# Patient Record
Sex: MALE | Race: WHITE | NOT HISPANIC OR LATINO | Employment: OTHER | URBAN - METROPOLITAN AREA
[De-identification: names, ages, dates, MRNs, and addresses within clinical notes are randomized per-mention and may not be internally consistent; named-entity substitution may affect disease eponyms.]

---

## 2017-05-18 ENCOUNTER — COMMUNICATION - HEALTHEAST (OUTPATIENT)
Dept: INTERNAL MEDICINE | Facility: CLINIC | Age: 62
End: 2017-05-18

## 2017-05-18 DIAGNOSIS — I10 HTN (HYPERTENSION): ICD-10-CM

## 2017-05-18 DIAGNOSIS — D86.2 SARCOIDOSIS OF LUNG WITH SARCOIDOSIS OF LYMPH NODES (H): ICD-10-CM

## 2017-05-22 ENCOUNTER — COMMUNICATION - HEALTHEAST (OUTPATIENT)
Dept: INTERNAL MEDICINE | Facility: CLINIC | Age: 62
End: 2017-05-22

## 2017-05-22 DIAGNOSIS — I10 UNSPECIFIED ESSENTIAL HYPERTENSION: ICD-10-CM

## 2017-07-27 ENCOUNTER — OFFICE VISIT - HEALTHEAST (OUTPATIENT)
Dept: INTERNAL MEDICINE | Facility: CLINIC | Age: 62
End: 2017-07-27

## 2017-07-27 DIAGNOSIS — Z13.220 SCREENING CHOLESTEROL LEVEL: ICD-10-CM

## 2017-07-27 DIAGNOSIS — Z00.00 ROUTINE GENERAL MEDICAL EXAMINATION AT A HEALTH CARE FACILITY: ICD-10-CM

## 2017-07-27 DIAGNOSIS — Z12.5 SPECIAL SCREENING FOR MALIGNANT NEOPLASM OF PROSTATE: ICD-10-CM

## 2017-07-27 DIAGNOSIS — D12.6 ADENOMATOUS COLON POLYP: ICD-10-CM

## 2017-07-27 DIAGNOSIS — D86.2 SARCOIDOSIS OF LUNG WITH SARCOIDOSIS OF LYMPH NODES (H): ICD-10-CM

## 2017-07-27 DIAGNOSIS — I10 ESSENTIAL HYPERTENSION: ICD-10-CM

## 2017-07-27 LAB
CHOLEST SERPL-MCNC: 184 MG/DL
FASTING STATUS PATIENT QL REPORTED: NO
HDLC SERPL-MCNC: 36 MG/DL
LDLC SERPL CALC-MCNC: 79 MG/DL
PSA SERPL-MCNC: 0.2 NG/ML (ref 0–4.5)
TRIGL SERPL-MCNC: 343 MG/DL

## 2017-07-27 ASSESSMENT — MIFFLIN-ST. JEOR: SCORE: 1891.69

## 2017-10-03 ENCOUNTER — COMMUNICATION - HEALTHEAST (OUTPATIENT)
Dept: INTERNAL MEDICINE | Facility: CLINIC | Age: 62
End: 2017-10-03

## 2017-10-03 DIAGNOSIS — I10 ESSENTIAL HYPERTENSION: ICD-10-CM

## 2017-11-18 ENCOUNTER — COMMUNICATION - HEALTHEAST (OUTPATIENT)
Dept: INTERNAL MEDICINE | Facility: CLINIC | Age: 62
End: 2017-11-18

## 2017-11-18 DIAGNOSIS — I10 ESSENTIAL HYPERTENSION: ICD-10-CM

## 2017-11-27 ENCOUNTER — COMMUNICATION - HEALTHEAST (OUTPATIENT)
Dept: INTERNAL MEDICINE | Facility: CLINIC | Age: 62
End: 2017-11-27

## 2017-11-28 ENCOUNTER — RECORDS - HEALTHEAST (OUTPATIENT)
Dept: GENERAL RADIOLOGY | Facility: CLINIC | Age: 62
End: 2017-11-28

## 2017-11-28 ENCOUNTER — OFFICE VISIT - HEALTHEAST (OUTPATIENT)
Dept: INTERNAL MEDICINE | Facility: CLINIC | Age: 62
End: 2017-11-28

## 2017-11-28 DIAGNOSIS — R05.9 COUGH: ICD-10-CM

## 2017-12-05 ENCOUNTER — OFFICE VISIT - HEALTHEAST (OUTPATIENT)
Dept: INTERNAL MEDICINE | Facility: CLINIC | Age: 62
End: 2017-12-05

## 2017-12-05 DIAGNOSIS — I80.02 THROMBOPHLEBITIS OF SUPERFICIAL VEINS OF LEFT LOWER EXTREMITY: ICD-10-CM

## 2018-01-01 ENCOUNTER — COMMUNICATION - HEALTHEAST (OUTPATIENT)
Dept: SCHEDULING | Facility: CLINIC | Age: 63
End: 2018-01-01

## 2018-01-03 ENCOUNTER — AMBULATORY - HEALTHEAST (OUTPATIENT)
Dept: MULTI SPECIALTY CLINIC | Facility: CLINIC | Age: 63
End: 2018-01-03

## 2018-01-03 DIAGNOSIS — I26.92 ACUTE SADDLE PULMONARY EMBOLISM WITHOUT ACUTE COR PULMONALE (H): ICD-10-CM

## 2018-01-08 ENCOUNTER — OFFICE VISIT - HEALTHEAST (OUTPATIENT)
Dept: INTERNAL MEDICINE | Facility: CLINIC | Age: 63
End: 2018-01-08

## 2018-01-08 DIAGNOSIS — I10 ESSENTIAL HYPERTENSION: ICD-10-CM

## 2018-01-08 DIAGNOSIS — I26.99 PULMONARY EMBOLI (H): ICD-10-CM

## 2018-01-08 DIAGNOSIS — I26.92 ACUTE SADDLE PULMONARY EMBOLISM WITHOUT ACUTE COR PULMONALE (H): ICD-10-CM

## 2018-01-08 DIAGNOSIS — N28.89 LEFT KIDNEY MASS: ICD-10-CM

## 2018-01-08 LAB
ANION GAP SERPL CALCULATED.3IONS-SCNC: 10 MMOL/L (ref 5–18)
BUN SERPL-MCNC: 20 MG/DL (ref 8–22)
CALCIUM SERPL-MCNC: 9.1 MG/DL (ref 8.5–10.5)
CHLORIDE BLD-SCNC: 104 MMOL/L (ref 98–107)
CO2 SERPL-SCNC: 27 MMOL/L (ref 22–31)
CREAT SERPL-MCNC: 1.19 MG/DL (ref 0.7–1.3)
GFR SERPL CREATININE-BSD FRML MDRD: >60 ML/MIN/1.73M2
GLUCOSE BLD-MCNC: 90 MG/DL (ref 70–125)
POTASSIUM BLD-SCNC: 4.3 MMOL/L (ref 3.5–5)
SODIUM SERPL-SCNC: 141 MMOL/L (ref 136–145)

## 2018-01-09 ENCOUNTER — COMMUNICATION - HEALTHEAST (OUTPATIENT)
Dept: INTERNAL MEDICINE | Facility: CLINIC | Age: 63
End: 2018-01-09

## 2018-01-09 ENCOUNTER — HOSPITAL ENCOUNTER (OUTPATIENT)
Dept: CT IMAGING | Facility: CLINIC | Age: 63
Discharge: HOME | End: 2018-01-09
Attending: INTERNAL MEDICINE

## 2018-01-09 DIAGNOSIS — I10 ESSENTIAL HYPERTENSION: ICD-10-CM

## 2018-01-09 DIAGNOSIS — N28.89 LEFT KIDNEY MASS: ICD-10-CM

## 2018-01-09 LAB
FACTOR V LEIDEN - HISTORICAL: NORMAL
PROTHROMBIN G20210A MUTATION - HISTORICAL: NORMAL
SPECIMEN STATUS: NORMAL
SPECIMEN STATUS: NORMAL
THROMBOPHILIA RISK ASSESSMENT-FVL: NORMAL
THROMBOPHILIA RISK ASSESSMENT-PGM: NORMAL

## 2018-01-10 LAB
PHOSPHOLIPID AB IGG, S: <9.4 GPL
PHOSPHOLIPID AB IGM, S: <9.4 MPL

## 2018-01-25 ENCOUNTER — COMMUNICATION - HEALTHEAST (OUTPATIENT)
Dept: INTERNAL MEDICINE | Facility: CLINIC | Age: 63
End: 2018-01-25

## 2018-02-06 ENCOUNTER — OFFICE VISIT - HEALTHEAST (OUTPATIENT)
Dept: INTERNAL MEDICINE | Facility: CLINIC | Age: 63
End: 2018-02-06

## 2018-02-06 DIAGNOSIS — I10 ESSENTIAL HYPERTENSION: ICD-10-CM

## 2018-02-06 DIAGNOSIS — R60.9 EDEMA: ICD-10-CM

## 2018-02-06 DIAGNOSIS — I26.99 PULMONARY EMBOLI (H): ICD-10-CM

## 2018-02-09 ENCOUNTER — COMMUNICATION - HEALTHEAST (OUTPATIENT)
Dept: LAB | Facility: CLINIC | Age: 63
End: 2018-02-09

## 2018-02-12 ENCOUNTER — AMBULATORY - HEALTHEAST (OUTPATIENT)
Dept: INTERNAL MEDICINE | Facility: CLINIC | Age: 63
End: 2018-02-12

## 2018-02-12 DIAGNOSIS — I10 HYPERTENSION: ICD-10-CM

## 2018-02-20 ENCOUNTER — AMBULATORY - HEALTHEAST (OUTPATIENT)
Dept: NURSING | Facility: CLINIC | Age: 63
End: 2018-02-20

## 2018-02-20 ENCOUNTER — AMBULATORY - HEALTHEAST (OUTPATIENT)
Dept: LAB | Facility: CLINIC | Age: 63
End: 2018-02-20

## 2018-02-20 DIAGNOSIS — I10 HYPERTENSION: ICD-10-CM

## 2018-02-20 LAB
ANION GAP SERPL CALCULATED.3IONS-SCNC: 12 MMOL/L (ref 5–18)
BUN SERPL-MCNC: 24 MG/DL (ref 8–22)
CALCIUM SERPL-MCNC: 8.8 MG/DL (ref 8.5–10.5)
CHLORIDE BLD-SCNC: 102 MMOL/L (ref 98–107)
CO2 SERPL-SCNC: 26 MMOL/L (ref 22–31)
CREAT SERPL-MCNC: 1.36 MG/DL (ref 0.7–1.3)
GFR SERPL CREATININE-BSD FRML MDRD: 53 ML/MIN/1.73M2
GLUCOSE BLD-MCNC: 108 MG/DL (ref 70–125)
POTASSIUM BLD-SCNC: 4.2 MMOL/L (ref 3.5–5)
SODIUM SERPL-SCNC: 140 MMOL/L (ref 136–145)

## 2018-03-19 ENCOUNTER — COMMUNICATION - HEALTHEAST (OUTPATIENT)
Dept: PULMONOLOGY | Facility: OTHER | Age: 63
End: 2018-03-19

## 2018-04-18 ENCOUNTER — HOSPITAL ENCOUNTER (OUTPATIENT)
Dept: CARDIOLOGY | Facility: CLINIC | Age: 63
Discharge: HOME | End: 2018-04-18
Attending: INTERNAL MEDICINE

## 2018-04-18 DIAGNOSIS — I26.92 ACUTE SADDLE PULMONARY EMBOLISM WITHOUT ACUTE COR PULMONALE (H): ICD-10-CM

## 2018-04-18 LAB
AORTIC ROOT: 3.2 CM
BSA FOR ECHO PROCEDURE: 2.35 M2
CV BLOOD PRESSURE: NORMAL MMHG
CV ECHO HEIGHT: 70 IN
CV ECHO WEIGHT: 248 LBS
DOP CALC LVOT AREA: 3.14 CM2
DOP CALC LVOT DIAMETER: 2 CM
DOP CALC LVOT PEAK VEL: 70.9 CM/S
DOP CALC LVOT STROKE VOLUME: 48 CM3
DOP CALCLVOT PEAK VEL VTI: 15.3 CM
EJECTION FRACTION: 56 % (ref 55–75)
FRACTIONAL SHORTENING: 27.7 % (ref 28–44)
INTERVENTRICULAR SEPTUM IN END DIASTOLE: 0.92 CM (ref 0.6–1)
IVS/PW RATIO: 1
LEFT ATRIUM SIZE: 3.7 CM
LEFT ATRIUM TO AORTIC ROOT RATIO: 1.16 NO UNITS
LEFT VENTRICLE DIASTOLIC VOLUME INDEX: 30.4 CM3/M2 (ref 34–74)
LEFT VENTRICLE DIASTOLIC VOLUME: 71.4 CM3 (ref 62–150)
LEFT VENTRICLE MASS INDEX: 66.7 G/M2
LEFT VENTRICLE SYSTOLIC VOLUME INDEX: 13.3 CM3/M2 (ref 11–31)
LEFT VENTRICLE SYSTOLIC VOLUME: 31.2 CM3 (ref 21–61)
LEFT VENTRICULAR INTERNAL DIMENSION IN DIASTOLE: 4.81 CM (ref 4.2–5.8)
LEFT VENTRICULAR INTERNAL DIMENSION IN SYSTOLE: 3.48 CM (ref 2.5–4)
LEFT VENTRICULAR MASS: 156.7 G
LEFT VENTRICULAR OUTFLOW TRACT MEAN GRADIENT: 1 MMHG
LEFT VENTRICULAR OUTFLOW TRACT MEAN VELOCITY: 50.3 CM/S
LEFT VENTRICULAR OUTFLOW TRACT PEAK GRADIENT: 2 MMHG
LEFT VENTRICULAR POSTERIOR WALL IN END DIASTOLE: 0.96 CM (ref 0.6–1)
LV STROKE VOLUME INDEX: 20.4 ML/M2
MITRAL VALVE E/A RATIO: 0.8
MV E'TISSUE VEL-LAT: 9.19 CM/S
MV LATERAL E/E' RATIO: 6.9
MV PEAK A VELOCITY: 84.5 CM/S
MV PEAK E VELOCITY: 63.6 CM/S
NUC REST DIASTOLIC VOLUME INDEX: 3968 LBS
NUC REST SYSTOLIC VOLUME INDEX: 70 IN
TRICUSPID VALVE ANULAR PLANE SYSTOLIC EXCURSION: 2.4 CM

## 2018-04-18 ASSESSMENT — MIFFLIN-ST. JEOR: SCORE: 1921.17

## 2018-05-04 ENCOUNTER — OFFICE VISIT - HEALTHEAST (OUTPATIENT)
Dept: PULMONOLOGY | Facility: OTHER | Age: 63
End: 2018-05-04

## 2018-05-04 DIAGNOSIS — I26.02 ACUTE SADDLE PULMONARY EMBOLISM WITH ACUTE COR PULMONALE (H): ICD-10-CM

## 2018-05-24 ENCOUNTER — COMMUNICATION - HEALTHEAST (OUTPATIENT)
Dept: INTENSIVE CARE | Facility: CLINIC | Age: 63
End: 2018-05-24

## 2018-05-24 DIAGNOSIS — I26.99 PULMONARY EMBOLISM WITH INFARCTION (H): ICD-10-CM

## 2018-06-03 ENCOUNTER — COMMUNICATION - HEALTHEAST (OUTPATIENT)
Dept: INTERNAL MEDICINE | Facility: CLINIC | Age: 63
End: 2018-06-03

## 2018-06-03 DIAGNOSIS — D86.2 SARCOIDOSIS OF LUNG WITH SARCOIDOSIS OF LYMPH NODES (H): ICD-10-CM

## 2018-06-03 DIAGNOSIS — I10 HTN (HYPERTENSION): ICD-10-CM

## 2018-06-19 ENCOUNTER — COMMUNICATION - HEALTHEAST (OUTPATIENT)
Dept: INTENSIVE CARE | Facility: CLINIC | Age: 63
End: 2018-06-19

## 2018-06-19 DIAGNOSIS — I26.99 PULMONARY EMBOLISM WITH INFARCTION (H): ICD-10-CM

## 2018-07-22 ENCOUNTER — COMMUNICATION - HEALTHEAST (OUTPATIENT)
Dept: INTENSIVE CARE | Facility: CLINIC | Age: 63
End: 2018-07-22

## 2018-07-22 DIAGNOSIS — I26.99 PULMONARY EMBOLISM WITH INFARCTION (H): ICD-10-CM

## 2018-07-30 ENCOUNTER — OFFICE VISIT - HEALTHEAST (OUTPATIENT)
Dept: INTERNAL MEDICINE | Facility: CLINIC | Age: 63
End: 2018-07-30

## 2018-07-30 DIAGNOSIS — Z86.0100 HISTORY OF COLONIC POLYPS: ICD-10-CM

## 2018-07-30 DIAGNOSIS — Z51.81 MEDICATION MONITORING ENCOUNTER: ICD-10-CM

## 2018-07-30 DIAGNOSIS — I10 BENIGN ESSENTIAL HYPERTENSION: ICD-10-CM

## 2018-07-30 DIAGNOSIS — Z00.00 HEALTH CARE MAINTENANCE: ICD-10-CM

## 2018-07-30 DIAGNOSIS — L98.9 SKIN ABNORMALITIES: ICD-10-CM

## 2018-07-30 DIAGNOSIS — Z86.711 HISTORY OF PULMONARY EMBOLISM: ICD-10-CM

## 2018-07-30 DIAGNOSIS — Z12.5 SCREENING FOR PROSTATE CANCER: ICD-10-CM

## 2018-07-30 LAB
ALBUMIN SERPL-MCNC: 3.9 G/DL (ref 3.5–5)
ALP SERPL-CCNC: 74 U/L (ref 45–120)
ALT SERPL W P-5'-P-CCNC: 24 U/L (ref 0–45)
ANION GAP SERPL CALCULATED.3IONS-SCNC: 14 MMOL/L (ref 5–18)
AST SERPL W P-5'-P-CCNC: 23 U/L (ref 0–40)
BILIRUB SERPL-MCNC: 0.7 MG/DL (ref 0–1)
BUN SERPL-MCNC: 21 MG/DL (ref 8–22)
CALCIUM SERPL-MCNC: 9.3 MG/DL (ref 8.5–10.5)
CHLORIDE BLD-SCNC: 102 MMOL/L (ref 98–107)
CHOLEST SERPL-MCNC: 209 MG/DL
CO2 SERPL-SCNC: 26 MMOL/L (ref 22–31)
CREAT SERPL-MCNC: 1.4 MG/DL (ref 0.7–1.3)
ERYTHROCYTE [DISTWIDTH] IN BLOOD BY AUTOMATED COUNT: 12.2 % (ref 11–14.5)
FASTING STATUS PATIENT QL REPORTED: YES
GFR SERPL CREATININE-BSD FRML MDRD: 52 ML/MIN/1.73M2
GLUCOSE BLD-MCNC: 89 MG/DL (ref 70–125)
HCT VFR BLD AUTO: 50.9 % (ref 40–54)
HDLC SERPL-MCNC: 44 MG/DL
HGB BLD-MCNC: 17 G/DL (ref 14–18)
LDLC SERPL CALC-MCNC: 111 MG/DL
MCH RBC QN AUTO: 31 PG (ref 27–34)
MCHC RBC AUTO-ENTMCNC: 33.4 G/DL (ref 32–36)
MCV RBC AUTO: 93 FL (ref 80–100)
PLATELET # BLD AUTO: 184 THOU/UL (ref 140–440)
PMV BLD AUTO: 8.3 FL (ref 7–10)
POTASSIUM BLD-SCNC: 4 MMOL/L (ref 3.5–5)
PROT SERPL-MCNC: 6.5 G/DL (ref 6–8)
PSA SERPL-MCNC: 0.3 NG/ML (ref 0–4.5)
RBC # BLD AUTO: 5.48 MILL/UL (ref 4.4–6.2)
SODIUM SERPL-SCNC: 142 MMOL/L (ref 136–145)
TRIGL SERPL-MCNC: 272 MG/DL
WBC: 10.8 THOU/UL (ref 4–11)

## 2018-07-30 RX ORDER — LATANOPROST 50 UG/ML
SOLUTION/ DROPS OPHTHALMIC
Refills: 4 | Status: SHIPPED | COMMUNITY
Start: 2018-05-16

## 2018-07-30 ASSESSMENT — MIFFLIN-ST. JEOR: SCORE: 1941.35

## 2018-07-31 ENCOUNTER — COMMUNICATION - HEALTHEAST (OUTPATIENT)
Dept: INTERNAL MEDICINE | Facility: CLINIC | Age: 63
End: 2018-07-31

## 2018-08-21 ENCOUNTER — COMMUNICATION - HEALTHEAST (OUTPATIENT)
Dept: INTENSIVE CARE | Facility: CLINIC | Age: 63
End: 2018-08-21

## 2018-08-21 DIAGNOSIS — I26.99 PULMONARY EMBOLISM WITH INFARCTION (H): ICD-10-CM

## 2018-11-16 ENCOUNTER — COMMUNICATION - HEALTHEAST (OUTPATIENT)
Dept: INTERNAL MEDICINE | Facility: CLINIC | Age: 63
End: 2018-11-16

## 2018-11-16 DIAGNOSIS — I10 ESSENTIAL HYPERTENSION: ICD-10-CM

## 2018-11-20 ENCOUNTER — COMMUNICATION - HEALTHEAST (OUTPATIENT)
Dept: INTERNAL MEDICINE | Facility: CLINIC | Age: 63
End: 2018-11-20

## 2018-11-20 DIAGNOSIS — I10 ESSENTIAL HYPERTENSION: ICD-10-CM

## 2018-11-23 ENCOUNTER — COMMUNICATION - HEALTHEAST (OUTPATIENT)
Dept: INTERNAL MEDICINE | Facility: CLINIC | Age: 63
End: 2018-11-23

## 2018-11-23 DIAGNOSIS — D86.2 SARCOIDOSIS OF LUNG WITH SARCOIDOSIS OF LYMPH NODES (H): ICD-10-CM

## 2018-11-23 DIAGNOSIS — I10 ESSENTIAL HYPERTENSION: ICD-10-CM

## 2018-11-23 DIAGNOSIS — I10 HTN (HYPERTENSION): ICD-10-CM

## 2019-01-31 ENCOUNTER — OFFICE VISIT - HEALTHEAST (OUTPATIENT)
Dept: INTERNAL MEDICINE | Facility: CLINIC | Age: 64
End: 2019-01-31

## 2019-01-31 DIAGNOSIS — I10 BENIGN ESSENTIAL HYPERTENSION: ICD-10-CM

## 2019-01-31 DIAGNOSIS — R53.83 FATIGUE, UNSPECIFIED TYPE: ICD-10-CM

## 2019-01-31 DIAGNOSIS — Z51.81 MEDICATION MONITORING ENCOUNTER: ICD-10-CM

## 2019-01-31 DIAGNOSIS — Z86.711 HISTORY OF PULMONARY EMBOLISM: ICD-10-CM

## 2019-01-31 LAB
ANION GAP SERPL CALCULATED.3IONS-SCNC: 10 MMOL/L (ref 5–18)
BUN SERPL-MCNC: 26 MG/DL (ref 8–22)
CALCIUM SERPL-MCNC: 9.4 MG/DL (ref 8.5–10.5)
CHLORIDE BLD-SCNC: 102 MMOL/L (ref 98–107)
CO2 SERPL-SCNC: 27 MMOL/L (ref 22–31)
CREAT SERPL-MCNC: 1.29 MG/DL (ref 0.7–1.3)
GFR SERPL CREATININE-BSD FRML MDRD: 57 ML/MIN/1.73M2
GLUCOSE BLD-MCNC: 100 MG/DL (ref 70–125)
POTASSIUM BLD-SCNC: 4.1 MMOL/L (ref 3.5–5)
SODIUM SERPL-SCNC: 139 MMOL/L (ref 136–145)

## 2019-02-01 ENCOUNTER — COMMUNICATION - HEALTHEAST (OUTPATIENT)
Dept: INTERNAL MEDICINE | Facility: CLINIC | Age: 64
End: 2019-02-01

## 2019-04-08 ENCOUNTER — OFFICE VISIT - HEALTHEAST (OUTPATIENT)
Dept: SLEEP MEDICINE | Facility: CLINIC | Age: 64
End: 2019-04-08

## 2019-04-08 DIAGNOSIS — R06.83 SNORING: ICD-10-CM

## 2019-04-08 DIAGNOSIS — G47.10 HYPERSOMNIA: ICD-10-CM

## 2019-04-08 DIAGNOSIS — G47.8 SLEEP DYSFUNCTION WITH SLEEP STAGE DISTURBANCE: ICD-10-CM

## 2019-04-08 DIAGNOSIS — E66.01 MORBID OBESITY (H): ICD-10-CM

## 2019-04-08 ASSESSMENT — MIFFLIN-ST. JEOR: SCORE: 1926.84

## 2019-05-02 ENCOUNTER — RECORDS - HEALTHEAST (OUTPATIENT)
Dept: ADMINISTRATIVE | Facility: OTHER | Age: 64
End: 2019-05-02

## 2019-05-02 ENCOUNTER — RECORDS - HEALTHEAST (OUTPATIENT)
Dept: SLEEP MEDICINE | Facility: CLINIC | Age: 64
End: 2019-05-02

## 2019-05-02 DIAGNOSIS — R06.83 SNORING: ICD-10-CM

## 2019-05-02 DIAGNOSIS — E66.01 MORBID (SEVERE) OBESITY DUE TO EXCESS CALORIES (H): ICD-10-CM

## 2019-05-02 DIAGNOSIS — G47.8 OTHER SLEEP DISORDERS: ICD-10-CM

## 2019-05-02 DIAGNOSIS — G47.10 HYPERSOMNIA, UNSPECIFIED: ICD-10-CM

## 2019-05-09 ENCOUNTER — AMBULATORY - HEALTHEAST (OUTPATIENT)
Dept: SLEEP MEDICINE | Facility: CLINIC | Age: 64
End: 2019-05-09

## 2019-05-09 ENCOUNTER — COMMUNICATION - HEALTHEAST (OUTPATIENT)
Dept: SLEEP MEDICINE | Facility: CLINIC | Age: 64
End: 2019-05-09

## 2019-05-09 DIAGNOSIS — G47.10 HYPERSOMNIA: ICD-10-CM

## 2019-05-09 DIAGNOSIS — G47.33 OBSTRUCTIVE SLEEP APNEA: ICD-10-CM

## 2019-05-13 ENCOUNTER — TELEPHONE (OUTPATIENT)
Dept: OTHER | Facility: CLINIC | Age: 64
End: 2019-05-13

## 2019-05-13 NOTE — TELEPHONE ENCOUNTER
Selection of Care System:  [facility]    Selection of primary provider/clinic:  DR. MADRIGAL / [facility]    In general how would you rate your overall health?  Good    In general how would you rate your overall mental or emotional health?  Good    Have you been to the emergency department 3 or more times in the past 6 months?  No    Do you have any health concerns that you need assistance finding a provider or scheduling an appointment? no    Medications:  NA    [place] or [facility] assigned to patient: no     Current Pharmacy/location and number: [facility]     Pharmacy of choice: [facility]      Prescription names: NA

## 2019-05-20 ENCOUNTER — AMBULATORY - HEALTHEAST (OUTPATIENT)
Dept: SLEEP MEDICINE | Facility: CLINIC | Age: 64
End: 2019-05-20

## 2019-05-20 ENCOUNTER — COMMUNICATION - HEALTHEAST (OUTPATIENT)
Dept: INTENSIVE CARE | Facility: CLINIC | Age: 64
End: 2019-05-20

## 2019-05-20 DIAGNOSIS — I26.99 PULMONARY EMBOLISM WITH INFARCTION (H): ICD-10-CM

## 2019-07-29 ENCOUNTER — OFFICE VISIT - HEALTHEAST (OUTPATIENT)
Dept: SLEEP MEDICINE | Facility: CLINIC | Age: 64
End: 2019-07-29

## 2019-07-29 DIAGNOSIS — G47.10 HYPERSOMNIA: ICD-10-CM

## 2019-07-29 DIAGNOSIS — G47.33 OBSTRUCTIVE SLEEP APNEA: ICD-10-CM

## 2019-07-29 DIAGNOSIS — G47.8 SLEEP DYSFUNCTION WITH SLEEP STAGE DISTURBANCE: ICD-10-CM

## 2019-07-29 ASSESSMENT — MIFFLIN-ST. JEOR: SCORE: 1963.13

## 2019-08-16 ENCOUNTER — COMMUNICATION - HEALTHEAST (OUTPATIENT)
Dept: INTERNAL MEDICINE | Facility: CLINIC | Age: 64
End: 2019-08-16

## 2019-08-16 DIAGNOSIS — I10 ESSENTIAL HYPERTENSION: ICD-10-CM

## 2019-08-27 ENCOUNTER — OFFICE VISIT - HEALTHEAST (OUTPATIENT)
Dept: INTERNAL MEDICINE | Facility: CLINIC | Age: 64
End: 2019-08-27

## 2019-08-27 DIAGNOSIS — Z86.711 HISTORY OF PULMONARY EMBOLISM: ICD-10-CM

## 2019-08-27 DIAGNOSIS — G47.30 SLEEP APNEA, UNSPECIFIED TYPE: ICD-10-CM

## 2019-08-27 DIAGNOSIS — Z00.00 HEALTHCARE MAINTENANCE: ICD-10-CM

## 2019-08-27 DIAGNOSIS — Z12.5 SCREENING FOR PROSTATE CANCER: ICD-10-CM

## 2019-08-27 DIAGNOSIS — Z51.81 MEDICATION MONITORING ENCOUNTER: ICD-10-CM

## 2019-08-27 DIAGNOSIS — I10 ESSENTIAL HYPERTENSION: ICD-10-CM

## 2019-08-27 DIAGNOSIS — Z86.0100 HISTORY OF COLONIC POLYPS: ICD-10-CM

## 2019-08-27 LAB
ALBUMIN SERPL-MCNC: 4 G/DL (ref 3.5–5)
ALP SERPL-CCNC: 82 U/L (ref 45–120)
ALT SERPL W P-5'-P-CCNC: 27 U/L (ref 0–45)
ANION GAP SERPL CALCULATED.3IONS-SCNC: 11 MMOL/L (ref 5–18)
AST SERPL W P-5'-P-CCNC: 24 U/L (ref 0–40)
BILIRUB SERPL-MCNC: 0.7 MG/DL (ref 0–1)
BUN SERPL-MCNC: 20 MG/DL (ref 8–22)
CALCIUM SERPL-MCNC: 9.3 MG/DL (ref 8.5–10.5)
CHLORIDE BLD-SCNC: 99 MMOL/L (ref 98–107)
CHOLEST SERPL-MCNC: 195 MG/DL
CO2 SERPL-SCNC: 28 MMOL/L (ref 22–31)
CREAT SERPL-MCNC: 1.37 MG/DL (ref 0.7–1.3)
ERYTHROCYTE [DISTWIDTH] IN BLOOD BY AUTOMATED COUNT: 11.3 % (ref 11–14.5)
FASTING STATUS PATIENT QL REPORTED: YES
GFR SERPL CREATININE-BSD FRML MDRD: 53 ML/MIN/1.73M2
GLUCOSE BLD-MCNC: 88 MG/DL (ref 70–125)
HCT VFR BLD AUTO: 51.7 % (ref 40–54)
HDLC SERPL-MCNC: 44 MG/DL
HGB BLD-MCNC: 17.6 G/DL (ref 14–18)
LDLC SERPL CALC-MCNC: 97 MG/DL
MCH RBC QN AUTO: 32.4 PG (ref 27–34)
MCHC RBC AUTO-ENTMCNC: 34 G/DL (ref 32–36)
MCV RBC AUTO: 95 FL (ref 80–100)
PLATELET # BLD AUTO: 171 THOU/UL (ref 140–440)
PMV BLD AUTO: 8.5 FL (ref 7–10)
POTASSIUM BLD-SCNC: 3.8 MMOL/L (ref 3.5–5)
PROT SERPL-MCNC: 6.6 G/DL (ref 6–8)
PSA SERPL-MCNC: 0.2 NG/ML (ref 0–4.5)
RBC # BLD AUTO: 5.41 MILL/UL (ref 4.4–6.2)
SODIUM SERPL-SCNC: 138 MMOL/L (ref 136–145)
TRIGL SERPL-MCNC: 271 MG/DL
WBC: 10.1 THOU/UL (ref 4–11)

## 2019-08-27 ASSESSMENT — MIFFLIN-ST. JEOR: SCORE: 1908.7

## 2019-08-28 ENCOUNTER — COMMUNICATION - HEALTHEAST (OUTPATIENT)
Dept: INTERNAL MEDICINE | Facility: CLINIC | Age: 64
End: 2019-08-28

## 2020-01-08 ENCOUNTER — OFFICE VISIT - HEALTHEAST (OUTPATIENT)
Dept: INTERNAL MEDICINE | Facility: CLINIC | Age: 65
End: 2020-01-08

## 2020-01-08 DIAGNOSIS — M25.512 ACUTE PAIN OF LEFT SHOULDER: ICD-10-CM

## 2020-01-09 ENCOUNTER — COMMUNICATION - HEALTHEAST (OUTPATIENT)
Dept: INTERNAL MEDICINE | Facility: CLINIC | Age: 65
End: 2020-01-09

## 2020-01-09 DIAGNOSIS — M25.512 ACUTE PAIN OF LEFT SHOULDER: ICD-10-CM

## 2020-01-14 ENCOUNTER — OFFICE VISIT - HEALTHEAST (OUTPATIENT)
Dept: PHYSICAL THERAPY | Facility: REHABILITATION | Age: 65
End: 2020-01-14

## 2020-01-14 DIAGNOSIS — M25.512 ACUTE PAIN OF LEFT SHOULDER: ICD-10-CM

## 2020-01-14 DIAGNOSIS — M54.2 NECK PAIN: ICD-10-CM

## 2020-01-23 ENCOUNTER — OFFICE VISIT - HEALTHEAST (OUTPATIENT)
Dept: PHYSICAL THERAPY | Facility: REHABILITATION | Age: 65
End: 2020-01-23

## 2020-01-23 DIAGNOSIS — M25.512 ACUTE PAIN OF LEFT SHOULDER: ICD-10-CM

## 2020-01-23 DIAGNOSIS — M54.2 NECK PAIN: ICD-10-CM

## 2020-01-30 ENCOUNTER — OFFICE VISIT - HEALTHEAST (OUTPATIENT)
Dept: PHYSICAL THERAPY | Facility: REHABILITATION | Age: 65
End: 2020-01-30

## 2020-01-30 DIAGNOSIS — M25.512 ACUTE PAIN OF LEFT SHOULDER: ICD-10-CM

## 2020-01-30 DIAGNOSIS — M54.2 NECK PAIN: ICD-10-CM

## 2020-02-06 ENCOUNTER — OFFICE VISIT - HEALTHEAST (OUTPATIENT)
Dept: PHYSICAL THERAPY | Facility: REHABILITATION | Age: 65
End: 2020-02-06

## 2020-02-06 DIAGNOSIS — M54.2 NECK PAIN: ICD-10-CM

## 2020-02-06 DIAGNOSIS — M25.512 ACUTE PAIN OF LEFT SHOULDER: ICD-10-CM

## 2020-02-10 ENCOUNTER — OFFICE VISIT - HEALTHEAST (OUTPATIENT)
Dept: INTERNAL MEDICINE | Facility: CLINIC | Age: 65
End: 2020-02-10

## 2020-02-10 DIAGNOSIS — M54.2 CERVICALGIA: ICD-10-CM

## 2020-02-13 ENCOUNTER — OFFICE VISIT - HEALTHEAST (OUTPATIENT)
Dept: PHYSICAL THERAPY | Facility: REHABILITATION | Age: 65
End: 2020-02-13

## 2020-02-13 DIAGNOSIS — M54.2 NECK PAIN: ICD-10-CM

## 2020-02-13 DIAGNOSIS — M25.512 ACUTE PAIN OF LEFT SHOULDER: ICD-10-CM

## 2020-02-14 ENCOUNTER — HOSPITAL ENCOUNTER (OUTPATIENT)
Dept: MRI IMAGING | Facility: CLINIC | Age: 65
Discharge: HOME | End: 2020-02-14

## 2020-02-14 ENCOUNTER — HOSPITAL ENCOUNTER (OUTPATIENT)
Dept: RADIOLOGY | Facility: CLINIC | Age: 65
Discharge: HOME | End: 2020-02-14

## 2020-02-14 DIAGNOSIS — M54.2 CERVICALGIA: ICD-10-CM

## 2020-02-14 DIAGNOSIS — T15.90XA FOREIGN BODY, EYE: ICD-10-CM

## 2020-02-19 ENCOUNTER — HOSPITAL ENCOUNTER (OUTPATIENT)
Dept: PHYSICAL MEDICINE AND REHAB | Facility: CLINIC | Age: 65
Discharge: HOME | End: 2020-02-19
Attending: PHYSICAL MEDICINE & REHABILITATION

## 2020-02-19 DIAGNOSIS — M48.02 CERVICAL STENOSIS OF SPINE: ICD-10-CM

## 2020-02-19 DIAGNOSIS — M54.12 CERVICAL RADICULAR PAIN: ICD-10-CM

## 2020-02-19 DIAGNOSIS — M50.20 CERVICAL DISC HERNIATION: ICD-10-CM

## 2020-02-19 DIAGNOSIS — M79.18 MYOFASCIAL PAIN: ICD-10-CM

## 2020-02-21 ENCOUNTER — COMMUNICATION - HEALTHEAST (OUTPATIENT)
Dept: PHYSICAL MEDICINE AND REHAB | Facility: CLINIC | Age: 65
End: 2020-02-21

## 2020-02-21 DIAGNOSIS — M50.20 CERVICAL DISC HERNIATION: ICD-10-CM

## 2020-02-25 ENCOUNTER — HOSPITAL ENCOUNTER (OUTPATIENT)
Dept: CT IMAGING | Facility: CLINIC | Age: 65
Discharge: HOME | End: 2020-02-25
Attending: PHYSICAL MEDICINE & REHABILITATION

## 2020-02-25 DIAGNOSIS — M50.20 CERVICAL DISC HERNIATION: ICD-10-CM

## 2020-02-26 ENCOUNTER — COMMUNICATION - HEALTHEAST (OUTPATIENT)
Dept: PHYSICAL MEDICINE AND REHAB | Facility: CLINIC | Age: 65
End: 2020-02-26

## 2020-02-26 ENCOUNTER — AMBULATORY - HEALTHEAST (OUTPATIENT)
Dept: PHYSICAL MEDICINE AND REHAB | Facility: CLINIC | Age: 65
End: 2020-02-26

## 2020-02-26 ENCOUNTER — AMBULATORY - HEALTHEAST (OUTPATIENT)
Dept: NEUROSURGERY | Facility: CLINIC | Age: 65
End: 2020-02-26

## 2020-02-26 DIAGNOSIS — M54.2 NECK PAIN: ICD-10-CM

## 2020-02-26 DIAGNOSIS — M48.02 CERVICAL STENOSIS OF SPINE: ICD-10-CM

## 2020-02-27 ENCOUNTER — HOSPITAL ENCOUNTER (OUTPATIENT)
Dept: RADIOLOGY | Facility: CLINIC | Age: 65
Discharge: HOME | End: 2020-02-27
Attending: NEUROLOGICAL SURGERY

## 2020-02-27 DIAGNOSIS — M54.2 NECK PAIN: ICD-10-CM

## 2020-03-12 ENCOUNTER — OFFICE VISIT - HEALTHEAST (OUTPATIENT)
Dept: NEUROSURGERY | Facility: CLINIC | Age: 65
End: 2020-03-12

## 2020-03-12 DIAGNOSIS — G95.20 CERVICAL SPINAL CORD COMPRESSION (H): ICD-10-CM

## 2020-03-12 DIAGNOSIS — M47.22 OSTEOARTHRITIS OF SPINE WITH RADICULOPATHY, CERVICAL REGION: ICD-10-CM

## 2020-03-12 DIAGNOSIS — M48.8X2 OSSIFICATION OF POSTERIOR LONGITUDINAL LIGAMENT IN CERVICAL REGION (H): ICD-10-CM

## 2020-03-12 ASSESSMENT — MIFFLIN-ST. JEOR: SCORE: 1951.79

## 2020-04-16 ENCOUNTER — OFFICE VISIT - HEALTHEAST (OUTPATIENT)
Dept: NEUROSURGERY | Facility: CLINIC | Age: 65
End: 2020-04-16

## 2020-04-16 DIAGNOSIS — G95.20 CERVICAL SPINAL CORD COMPRESSION (H): ICD-10-CM

## 2020-04-16 DIAGNOSIS — M48.8X2 OSSIFICATION OF POSTERIOR LONGITUDINAL LIGAMENT IN CERVICAL REGION (H): ICD-10-CM

## 2020-04-16 DIAGNOSIS — M47.22 OSTEOARTHRITIS OF SPINE WITH RADICULOPATHY, CERVICAL REGION: ICD-10-CM

## 2020-04-16 ASSESSMENT — MIFFLIN-ST. JEOR: SCORE: 1951.79

## 2020-05-15 ENCOUNTER — COMMUNICATION - HEALTHEAST (OUTPATIENT)
Dept: INTENSIVE CARE | Facility: CLINIC | Age: 65
End: 2020-05-15

## 2020-05-15 DIAGNOSIS — I26.99 PULMONARY EMBOLISM WITH INFARCTION (H): ICD-10-CM

## 2020-05-29 ENCOUNTER — RECORDS - HEALTHEAST (OUTPATIENT)
Dept: ADMINISTRATIVE | Facility: OTHER | Age: 65
End: 2020-05-29

## 2020-07-06 ENCOUNTER — COMMUNICATION - HEALTHEAST (OUTPATIENT)
Dept: INTERNAL MEDICINE | Facility: CLINIC | Age: 65
End: 2020-07-06

## 2020-07-06 DIAGNOSIS — I10 ESSENTIAL HYPERTENSION: ICD-10-CM

## 2020-07-23 ENCOUNTER — RECORDS - HEALTHEAST (OUTPATIENT)
Dept: ADMINISTRATIVE | Facility: OTHER | Age: 65
End: 2020-07-23

## 2020-09-02 ENCOUNTER — OFFICE VISIT - HEALTHEAST (OUTPATIENT)
Dept: INTERNAL MEDICINE | Facility: CLINIC | Age: 65
End: 2020-09-02

## 2020-09-02 DIAGNOSIS — Z86.0100 HISTORY OF COLONIC POLYPS: ICD-10-CM

## 2020-09-02 DIAGNOSIS — M48.02 SPINAL STENOSIS IN CERVICAL REGION: ICD-10-CM

## 2020-09-02 DIAGNOSIS — I10 ESSENTIAL HYPERTENSION: ICD-10-CM

## 2020-09-02 DIAGNOSIS — Z86.711 HISTORY OF PULMONARY EMBOLISM: ICD-10-CM

## 2020-09-02 DIAGNOSIS — Z00.00 HEALTHCARE MAINTENANCE: ICD-10-CM

## 2020-09-02 DIAGNOSIS — Z51.81 MEDICATION MONITORING ENCOUNTER: ICD-10-CM

## 2020-09-02 DIAGNOSIS — Z12.5 SCREENING FOR PROSTATE CANCER: ICD-10-CM

## 2020-09-02 DIAGNOSIS — G47.30 SLEEP APNEA, UNSPECIFIED TYPE: ICD-10-CM

## 2020-09-02 LAB
ALBUMIN SERPL-MCNC: 3.9 G/DL (ref 3.5–5)
ALP SERPL-CCNC: 80 U/L (ref 45–120)
ALT SERPL W P-5'-P-CCNC: 30 U/L (ref 0–45)
ANION GAP SERPL CALCULATED.3IONS-SCNC: 13 MMOL/L (ref 5–18)
AST SERPL W P-5'-P-CCNC: 30 U/L (ref 0–40)
BILIRUB SERPL-MCNC: 0.6 MG/DL (ref 0–1)
BUN SERPL-MCNC: 23 MG/DL (ref 8–22)
CALCIUM SERPL-MCNC: 9.1 MG/DL (ref 8.5–10.5)
CHLORIDE BLD-SCNC: 103 MMOL/L (ref 98–107)
CHOLEST SERPL-MCNC: 199 MG/DL
CO2 SERPL-SCNC: 24 MMOL/L (ref 22–31)
CREAT SERPL-MCNC: 1.35 MG/DL (ref 0.7–1.3)
ERYTHROCYTE [DISTWIDTH] IN BLOOD BY AUTOMATED COUNT: 11 % (ref 11–14.5)
FASTING STATUS PATIENT QL REPORTED: ABNORMAL
GFR SERPL CREATININE-BSD FRML MDRD: 53 ML/MIN/1.73M2
GLUCOSE BLD-MCNC: 100 MG/DL (ref 70–125)
HCT VFR BLD AUTO: 51.4 % (ref 40–54)
HDLC SERPL-MCNC: 43 MG/DL
HGB BLD-MCNC: 17.7 G/DL (ref 14–18)
LDLC SERPL CALC-MCNC: 106 MG/DL
MCH RBC QN AUTO: 32.4 PG (ref 27–34)
MCHC RBC AUTO-ENTMCNC: 34.3 G/DL (ref 32–36)
MCV RBC AUTO: 94 FL (ref 80–100)
PLATELET # BLD AUTO: 178 THOU/UL (ref 140–440)
PMV BLD AUTO: 8.8 FL (ref 7–10)
POTASSIUM BLD-SCNC: 4 MMOL/L (ref 3.5–5)
PROT SERPL-MCNC: 6.5 G/DL (ref 6–8)
PSA SERPL-MCNC: 0.2 NG/ML (ref 0–4.5)
RBC # BLD AUTO: 5.45 MILL/UL (ref 4.4–6.2)
SODIUM SERPL-SCNC: 140 MMOL/L (ref 136–145)
TRIGL SERPL-MCNC: 251 MG/DL
WBC: 11.5 THOU/UL (ref 4–11)

## 2020-09-02 ASSESSMENT — MIFFLIN-ST. JEOR: SCORE: 1980.7

## 2020-09-23 ENCOUNTER — RECORDS - HEALTHEAST (OUTPATIENT)
Dept: ADMINISTRATIVE | Facility: OTHER | Age: 65
End: 2020-09-23

## 2020-09-30 ENCOUNTER — HOSPITAL ENCOUNTER (OUTPATIENT)
Dept: MRI IMAGING | Facility: CLINIC | Age: 65
Discharge: HOME | End: 2020-09-30
Attending: INTERNAL MEDICINE

## 2020-09-30 DIAGNOSIS — M48.02 SPINAL STENOSIS IN CERVICAL REGION: ICD-10-CM

## 2020-10-07 ENCOUNTER — COMMUNICATION - HEALTHEAST (OUTPATIENT)
Dept: INTERNAL MEDICINE | Facility: CLINIC | Age: 65
End: 2020-10-07

## 2020-10-07 DIAGNOSIS — I10 ESSENTIAL HYPERTENSION: ICD-10-CM

## 2020-10-07 RX ORDER — SPIRONOLACTONE 25 MG/1
25 TABLET ORAL DAILY
Qty: 90 TABLET | Refills: 3 | Status: SHIPPED | OUTPATIENT
Start: 2020-10-07 | End: 2021-09-28

## 2020-10-13 ENCOUNTER — OFFICE VISIT - HEALTHEAST (OUTPATIENT)
Dept: NEUROSURGERY | Facility: CLINIC | Age: 65
End: 2020-10-13

## 2020-10-13 DIAGNOSIS — M48.8X2 OSSIFICATION OF POSTERIOR LONGITUDINAL LIGAMENT IN CERVICAL REGION (H): ICD-10-CM

## 2020-10-13 DIAGNOSIS — M47.22 OSTEOARTHRITIS OF SPINE WITH RADICULOPATHY, CERVICAL REGION: ICD-10-CM

## 2020-10-13 DIAGNOSIS — M40.12 OTHER SECONDARY KYPHOSIS, CERVICAL REGION: ICD-10-CM

## 2020-10-13 ASSESSMENT — MIFFLIN-ST. JEOR: SCORE: 2013.82

## 2020-10-18 ENCOUNTER — COMMUNICATION - HEALTHEAST (OUTPATIENT)
Dept: INTERNAL MEDICINE | Facility: CLINIC | Age: 65
End: 2020-10-18

## 2020-10-18 DIAGNOSIS — F34.1 DYSTHYMIA: ICD-10-CM

## 2020-11-09 ENCOUNTER — COMMUNICATION - HEALTHEAST (OUTPATIENT)
Dept: INTERNAL MEDICINE | Facility: CLINIC | Age: 65
End: 2020-11-09

## 2020-11-09 DIAGNOSIS — I10 ESSENTIAL HYPERTENSION: ICD-10-CM

## 2020-11-10 RX ORDER — CHLORTHALIDONE 25 MG/1
12.5 TABLET ORAL DAILY
Qty: 45 TABLET | Refills: 2 | Status: SHIPPED | OUTPATIENT
Start: 2020-11-10 | End: 2021-08-03

## 2020-12-02 ENCOUNTER — OFFICE VISIT - HEALTHEAST (OUTPATIENT)
Dept: BEHAVIORAL HEALTH | Facility: CLINIC | Age: 65
End: 2020-12-02

## 2020-12-02 DIAGNOSIS — F32.89 OTHER DEPRESSION: ICD-10-CM

## 2020-12-02 RX ORDER — BROMFENAC SODIUM 0.7 MG/ML
SOLUTION/ DROPS OPHTHALMIC
Status: SHIPPED | COMMUNITY
Start: 2020-10-23 | End: 2023-03-03

## 2020-12-02 ASSESSMENT — ANXIETY QUESTIONNAIRES
7. FEELING AFRAID AS IF SOMETHING AWFUL MIGHT HAPPEN: NOT AT ALL
6. BECOMING EASILY ANNOYED OR IRRITABLE: MORE THAN HALF THE DAYS
IF YOU CHECKED OFF ANY PROBLEMS ON THIS QUESTIONNAIRE, HOW DIFFICULT HAVE THESE PROBLEMS MADE IT FOR YOU TO DO YOUR WORK, TAKE CARE OF THINGS AT HOME, OR GET ALONG WITH OTHER PEOPLE: SOMEWHAT DIFFICULT
3. WORRYING TOO MUCH ABOUT DIFFERENT THINGS: NOT AT ALL
4. TROUBLE RELAXING: NOT AT ALL
GAD7 TOTAL SCORE: 2
2. NOT BEING ABLE TO STOP OR CONTROL WORRYING: NOT AT ALL
1. FEELING NERVOUS, ANXIOUS, OR ON EDGE: NOT AT ALL
5. BEING SO RESTLESS THAT IT IS HARD TO SIT STILL: NOT AT ALL

## 2020-12-02 ASSESSMENT — PATIENT HEALTH QUESTIONNAIRE - PHQ9: SUM OF ALL RESPONSES TO PHQ QUESTIONS 1-9: 6

## 2021-02-10 ENCOUNTER — OFFICE VISIT - HEALTHEAST (OUTPATIENT)
Dept: FAMILY MEDICINE | Facility: CLINIC | Age: 66
End: 2021-02-10

## 2021-02-10 ENCOUNTER — COMMUNICATION - HEALTHEAST (OUTPATIENT)
Dept: SCHEDULING | Facility: CLINIC | Age: 66
End: 2021-02-10

## 2021-02-10 DIAGNOSIS — M10.9 ACUTE GOUT OF RIGHT HAND, UNSPECIFIED CAUSE: ICD-10-CM

## 2021-03-02 ENCOUNTER — OFFICE VISIT - HEALTHEAST (OUTPATIENT)
Dept: INTERNAL MEDICINE | Facility: CLINIC | Age: 66
End: 2021-03-02

## 2021-03-02 DIAGNOSIS — I10 ESSENTIAL HYPERTENSION: ICD-10-CM

## 2021-03-02 DIAGNOSIS — Z86.0100 HISTORY OF COLONIC POLYPS: ICD-10-CM

## 2021-03-02 DIAGNOSIS — Z86.711 HISTORY OF PULMONARY EMBOLISM: ICD-10-CM

## 2021-03-02 DIAGNOSIS — Z87.39 HISTORY OF GOUT: ICD-10-CM

## 2021-03-02 DIAGNOSIS — Z51.81 ENCOUNTER FOR THERAPEUTIC DRUG MONITORING: ICD-10-CM

## 2021-03-02 DIAGNOSIS — M48.02 CERVICAL STENOSIS OF SPINAL CANAL: ICD-10-CM

## 2021-03-02 DIAGNOSIS — G47.33 OSA (OBSTRUCTIVE SLEEP APNEA): ICD-10-CM

## 2021-03-02 LAB
ALBUMIN SERPL-MCNC: 3.9 G/DL (ref 3.5–5)
ALP SERPL-CCNC: 83 U/L (ref 45–120)
ALT SERPL W P-5'-P-CCNC: 21 U/L (ref 0–45)
ANION GAP SERPL CALCULATED.3IONS-SCNC: 11 MMOL/L (ref 5–18)
AST SERPL W P-5'-P-CCNC: 20 U/L (ref 0–40)
BILIRUB SERPL-MCNC: 0.8 MG/DL (ref 0–1)
BUN SERPL-MCNC: 20 MG/DL (ref 8–22)
CALCIUM SERPL-MCNC: 8.6 MG/DL (ref 8.5–10.5)
CHLORIDE BLD-SCNC: 102 MMOL/L (ref 98–107)
CO2 SERPL-SCNC: 28 MMOL/L (ref 22–31)
CREAT SERPL-MCNC: 1.45 MG/DL (ref 0.7–1.3)
GFR SERPL CREATININE-BSD FRML MDRD: 49 ML/MIN/1.73M2
GLUCOSE BLD-MCNC: 103 MG/DL (ref 70–125)
POTASSIUM BLD-SCNC: 4.2 MMOL/L (ref 3.5–5)
PROT SERPL-MCNC: 6.3 G/DL (ref 6–8)
SODIUM SERPL-SCNC: 141 MMOL/L (ref 136–145)
URATE SERPL-MCNC: 9 MG/DL (ref 3–8)

## 2021-05-19 ENCOUNTER — COMMUNICATION - HEALTHEAST (OUTPATIENT)
Dept: INTERNAL MEDICINE | Facility: CLINIC | Age: 66
End: 2021-05-19

## 2021-05-19 DIAGNOSIS — I26.99 PULMONARY EMBOLISM WITH INFARCTION (H): ICD-10-CM

## 2021-05-26 ENCOUNTER — RECORDS - HEALTHEAST (OUTPATIENT)
Dept: ADMINISTRATIVE | Facility: CLINIC | Age: 66
End: 2021-05-26

## 2021-05-26 ASSESSMENT — PATIENT HEALTH QUESTIONNAIRE - PHQ9: SUM OF ALL RESPONSES TO PHQ QUESTIONS 1-9: 6

## 2021-05-27 NOTE — PROGRESS NOTES
Dear Dr. Barboza, Meng GERMAN Md  [address] [place] [address]    Thank you for the opportunity to participate in the care of . Rui Lopez.    He is a 64 y.o. male who comes to the clinic with a chief complaint of excessive daytime sleepiness that has been going on for approximately 2 months.  However he feels that it has worsened in the past 6 months.  While the patient denies any episodes of witnessed apnea he has been told that he does have loud snoring.  He also has difficulty reinitiating sleep if he gets woken up in the middle the night.  The patient's review of systems is otherwise significant for history of PE.     Ideal Sleep-Wake Cycle(devoid of societal pressure):    Patient would try to initiate sleep at around 11 PM with a sleep latency of 5 minutes. The patient would have 2-3 awakening. Final wake up time is around 7:30 AM.    Past Medical History  Past Medical History:   Diagnosis Date     High cholesterol      Hypertension         Past Surgical History  Past Surgical History:   Procedure Laterality Date     CATARACT EXTRACTION Left 2006     CATARACT EXTRACTION Right 2004     CYSTECTOMY  1973    Testicular cystectomy     RETINAL DETACHMENT SURGERY Left 2005     RETINAL DETACHMENT SURGERY Right 2004        Meds  Current Outpatient Medications   Medication Sig Dispense Refill     chlorthalidone (HYGROTEN) 25 MG tablet TAKE 1/2 TABLET(12.5 MG) BY MOUTH DAILY (Patient taking differently: TAKE 1/2 TABLET(12.5 MG) BY MOUTH IN THE MORNING) 45 tablet 2     latanoprost (XALATAN) 0.005 % ophthalmic solution USE ONE DROP IN OU D  4     spironolactone (ALDACTONE) 25 MG tablet Take 1 tablet (25 mg total) by mouth daily. (Patient taking differently: Take 25 mg by mouth every morning.       ) 90 tablet 2     XARELTO 20 mg Tab TAKE 1 TABLET BY MOUTH ONCE DAILY. (Patient taking differently: TAKE 1 TABLET BY MOUTH EVERY EVENING.) 90 tablet 2     No current facility-administered medications for this visit.          Allergies  Patient has no known allergies.     Social History  Social History     Socioeconomic History     Marital status:      Spouse name: Not on file     Number of children: Not on file     Years of education: Not on file     Highest education level: Not on file   Occupational History     Not on file   Social Needs     Financial resource strain: Not on file     Food insecurity:     Worry: Not on file     Inability: Not on file     Transportation needs:     Medical: Not on file     Non-medical: Not on file   Tobacco Use     Smoking status: Never Smoker     Smokeless tobacco: Never Used   Substance and Sexual Activity     Alcohol use: Yes     Comment: occ beer     Drug use: No     Sexual activity: Not on file   Lifestyle     Physical activity:     Days per week: Not on file     Minutes per session: Not on file     Stress: Not on file   Relationships     Social connections:     Talks on phone: Not on file     Gets together: Not on file     Attends Alevism service: Not on file     Active member of club or organization: Not on file     Attends meetings of clubs or organizations: Not on file     Relationship status: Not on file     Intimate partner violence:     Fear of current or ex partner: Not on file     Emotionally abused: Not on file     Physically abused: Not on file     Forced sexual activity: Not on file   Other Topics Concern     Not on file   Social History Narrative     Not on file        Family History  Family History   Problem Relation Age of Onset     Hypothyroidism Mother      Leukemia Mother      Arthritis Mother      Hypertension Father      Aortic aneurysm Father         AAA     Colon polyps Father      Kidney cancer Maternal Grandfather         Review of Systems:  Constitutional: Negative except as noted in HPI.   Eyes: Negative except as noted in HPI.   ENT: Negative except as noted in HPI.   Cardiovascular: Negative except as noted in HPI.   Respiratory: Negative except as noted  "in HPI.   Gastrointestinal: Negative except as noted in HPI.   Genitourinary: Negative except as noted in HPI.   Musculoskeletal: Negative except as noted in HPI.   Integumentary: Negative except as noted in HPI.   Neurological: Negative except as noted in HPI.   Psychiatric: Negative except as noted in HPI.   Endocrine: Negative except as noted in HPI.   Hematologic/Lymphatic: Negative except as noted in HPI.      STOP BANG 4/8/2019   Do you snore loudly (louder than talking or loud enough to be heard through closed doors)? 1   Do you often feel tired, fatigued, or sleepy during daytime? 1   Has anyone observed you stop breathing in your sleep? 0   Do you have or are you being treated for high blood pressure? 1   BMI more than 35 kg/m2 1   Age over 50 years old? 1   Neck circumference greater than 16 inches? 1   Gender male? 1   Total Score 7     Epworths Sleepiness Scale 4/8/2019   Sitting and reading 1   Watching TV 3   Sitting, inactive in a public place (e.g. a theatre or a meeting) 0   As a passenger in a car for an hour without a break 0   Lying down to rest in the afternoon when circumstances permit 3   Sitting and talking to someone 0   Sitting quietly after a lunch without alcohol 0   In a car, while stopped for a few minutes in traffic 0   Total score 7     Rooming 4/8/2019   Usual bedtime 10pm   Sleep Latency 5 minutes   Awakenings 2-3 times   Wake Up Time 5:10am   Weekends varies   Energy Drinks 0   Coffee 1 16oz in am   Cola 0   Difficulty falling asleep No   Difficulty staying asleep Yes   Excessive daytime tiredness Yes   Excessive daytime sleepiness No   Dozing off while driving No   Shift Worker No   Sleep Walking? No   Sleep Talking? Yes   Kicking or punching? No   Restless legs symptoms No       Physical Exam:  /64   Pulse 79   Ht 5' 9.5\" (1.765 m)   Wt (!) 251 lb (113.9 kg)   SpO2 95%   BMI 36.53 kg/m    BMI:Body mass index is 36.53 kg/m .   GEN: NAD, obese  Head: " Normocephalic.  EYES: PERRLA, EOMI  ENT: Oropharynx is clear, mallampatti class 4+ airway.   Nasal mucosa is moist without erythema  Neck : Thyroid is within normal limits.  Neck circumference 19 inches  CV: Regular rate and rhythm, S1 & S2 positive.  LUNGS: Bilateral breathsounds heard.   ABDOMEN: Positive bowel sounds in all quadrants, soft, no rebound or guarding  MUSCULOSKELETAL: Bilateral 2+ leg swelling  SKIN: warm, dry, no rashes  Neurological: Alert, oriented to time, place, and person.  Psych: normal mood, normal affect     Labs/Studies:     Lab Results   Component Value Date    WBC 10.8 07/30/2018    HGB 17.0 07/30/2018    HCT 50.9 07/30/2018    MCV 93 07/30/2018     07/30/2018         Chemistry        Component Value Date/Time     01/31/2019 1523    K 4.1 01/31/2019 1523     01/31/2019 1523    CO2 27 01/31/2019 1523    BUN 26 (H) 01/31/2019 1523    CREATININE 1.29 01/31/2019 1523     01/31/2019 1523        Component Value Date/Time    CALCIUM 9.4 01/31/2019 1523    ALKPHOS 74 07/30/2018 1616    AST 23 07/30/2018 1616    ALT 24 07/30/2018 1616    BILITOT 0.7 07/30/2018 1616            No results found for: FERRITIN  No results found for: TSH      Assessment and Plan:  In summary Rui Lopez is a 64 y.o. year old male here for sleep disturbance.  1.  Hypersomnia   Mr. Rui Lopez has high risk for obstructive sleep apnea based on the history of hypersomnia, snoring and a crowded airway. I educated the patient on the underlying pathophysiology of obstructive sleep apnea. We reviewed the risks associated with sleep apnea, including increased cardiovascular risk and overall death. We talked about treatments briefly. I recommend getting an split-night nocturnal polysomnography. The patient should return to the clinic to discuss results and treatment option in a patient-centered approach.  2.  Snoring  3.  Other sleep disturbance  4.  Morbid obesity    Patient verbalized  understanding of these issues, agrees with the plan and all questions were answered today. Patient was given an opportuntity to voice any other symptoms or concerns not listed above. Patient did not have any other symptoms or concerns.      Patient told to return in one week after the sleep study is interpreted.      Bret De La Fuente DO  Board Certified in Internal Medicine and Sleep Medicine  [facility].    (Note created with Dragon voice recognition and unintended spelling errors and word substitutions may occur)

## 2021-05-28 ASSESSMENT — ANXIETY QUESTIONNAIRES: GAD7 TOTAL SCORE: 2

## 2021-05-28 NOTE — PROGRESS NOTES
Patient was offered choice of vendor and chose [facility].  Patient Rui Lopez was set up at [facility] on 5/20/19. Patient received a [employer] [id number] Auto. Pressures were set at 6-15.   Patient s ramp is 6 cm H2O for off and FLEX/EPR is EPR 2.  Patient received a [employer] Mask name: [id number]  nasal Size med, heated tubing and heated humidifier.    Pacee Her

## 2021-05-28 NOTE — TELEPHONE ENCOUNTER
The overnight polysomnography was reviewed.   The patient had obstructive sleep apnea.    I have called the patient and informed his of the results. I offered the patient the option of getting started on pressure therapy and the patient agreed. The patient would like to use [employer] as his durable medical equipment company.    The prescription is in the chart.    Contact information for [employer] company:    [employer] Tel: [phone]    Please schedule the patient to follow up with me 4 weeks after he obtains the machine. May double book except for my 1/2 days.    Thank you.

## 2021-05-28 NOTE — TELEPHONE ENCOUNTER
Patient has obtained his CPAP equipment. Please call the patient to schedule a follow up visit to see me. Please have him bring his CPAP machine with him on the next clinical visit with me. Thank you.

## 2021-05-28 NOTE — PROGRESS NOTES
Order for Durable Medical Equipment was processed and equipment ordered.     DME Company: [employer]    Date: 5/9/2019    Ordering Provider: Dr. De La Fuente    Equipment Ordered: CPAP    Fax Number: [facility] [employer] [facility] (in house)    Eveline Hicks CMA 5/9/2019 8:59 AM

## 2021-05-29 NOTE — TELEPHONE ENCOUNTER
RN cannot approve Refill Request    RN can NOT refill this medication med is not covered by policy/route to provider. Last office visit: 1/31/2019 Meng Barboza MD Last Physical: 7/30/2018 Last MTM visit: Visit date not found Last visit same specialty: Visit date not found.  Next visit within 3 mo: Visit date not found  Next physical within 3 mo: Visit date not found      Tiff Kwon, [facility] Connection Triage/Med Refill 5/21/2019    Requested Prescriptions   Pending Prescriptions Disp Refills     XARELTO 20 mg Tab [Pharmacy Med Name: XARELTO 20MG TABLETS] 90 tablet 0     Sig: TAKE 1 TABLET BY MOUTH ONCE DAILY       There is no refill protocol information for this order

## 2021-05-30 NOTE — PROGRESS NOTES
"Dear Dr. Barboza, Meng GERMAN MD  [address] [place] [address],    Thank you for the opportunity to participate in the care of Rui Lopez.   This is the patient's first clinical visit since starting CPAP therapy and is new machine.  I also reviewed the results of his sleep study with him in detail.  The patient states that his sleep quality and energy level both dramatically improved.    Compliance Download data for 30 Days:  Pressure setting:APAP 6-15 cwp  Residual AHI:1.4 events per hour  Leak:Minimal  Compliance:100%  Mask Tolerance:Good  Skin irritation: None      Current Outpatient Medications   Medication Sig Dispense Refill     chlorthalidone (HYGROTEN) 25 MG tablet TAKE 1/2 TABLET(12.5 MG) BY MOUTH DAILY (Patient taking differently: TAKE 1/2 TABLET(12.5 MG) BY MOUTH IN THE MORNING) 45 tablet 2     latanoprost (XALATAN) 0.005 % ophthalmic solution USE ONE DROP IN OU D  4     rivaroxaban (XARELTO) 20 mg Tab Take 1 tablet (20 mg total) by mouth every evening. 90 tablet 3     spironolactone (ALDACTONE) 25 MG tablet Take 1 tablet (25 mg total) by mouth daily. (Patient taking differently: Take 25 mg by mouth every morning.       ) 90 tablet 2     No current facility-administered medications for this visit.        No Known Allergies    Epworths Sleepiness Scale 4/8/2019 7/29/2019   Sitting and reading 1 1   Watching TV 3 1   Sitting, inactive in a public place (e.g. a theatre or a meeting) 0 1   As a passenger in a car for an hour without a break 0 0   Lying down to rest in the afternoon when circumstances permit 3 1   Sitting and talking to someone 0 0   Sitting quietly after a lunch without alcohol 0 0   In a car, while stopped for a few minutes in traffic 0 0   Total score 7 4       Physical Exam:  /60 (Patient Site: Right Arm, Patient Position: Sitting, Cuff Size: Adult Large)   Pulse 78   Ht 5' 9.5\" (1.765 m)   Wt (!) 259 lb (117.5 kg)   SpO2 95%   BMI 37.70 kg/m    BMI:Body mass index is " 37.7 kg/m .   GEN: NAD, obese  Psych: normal mood, normal affect     Labs/Studies:  - We reviewed the results of the overnight PSG as described on the HPI.     Lab Results   Component Value Date    WBC 10.8 07/30/2018    HGB 17.0 07/30/2018    HCT 50.9 07/30/2018    MCV 93 07/30/2018     07/30/2018         Chemistry        Component Value Date/Time     01/31/2019 1523    K 4.1 01/31/2019 1523     01/31/2019 1523    CO2 27 01/31/2019 1523    BUN 26 (H) 01/31/2019 1523    CREATININE 1.29 01/31/2019 1523     01/31/2019 1523        Component Value Date/Time    CALCIUM 9.4 01/31/2019 1523    ALKPHOS 74 07/30/2018 1616    AST 23 07/30/2018 1616    ALT 24 07/30/2018 1616    BILITOT 0.7 07/30/2018 1616            No results found for: FERRITIN        Assessment and Plan:  In summary Rui Lopez is a 64 y.o. year old male here for follow-up.  1. Obstructive Sleep Apnea  I reviewed the results of the patient's sleep study with him in detail.  I also congratulated the patient on his excellent CPAP usage.  I narrowed his CPAP pressure range to 12.2 CWP and had him test at this pressure setting in front of me.  He felt this was comfortable.  I welcomed the patient to follow-up with me every 2 years.  2.  Hypersomnia  Improving  3.  Other sleep disturbance     Patient verbalized understanding of these issues, agrees with the plan and all questions were answered today. Patient was given an opportuntity to voice any other symptoms or concerns not listed above. Patient did not have any other symptoms or concerns.      Bret De La Fuente DO  Board Certified in Internal Medicine and Sleep Medicine  [facility].      (Note created with Dragon voice recognition and unintended spelling errors and word substitutions may occur)

## 2021-05-31 VITALS — BODY MASS INDEX: 37.66 KG/M2 | WEIGHT: 255 LBS

## 2021-05-31 VITALS — WEIGHT: 245 LBS | HEIGHT: 69 IN | BODY MASS INDEX: 36.29 KG/M2

## 2021-05-31 NOTE — PROGRESS NOTES
[facility] clinic Follow Up Note    Rui Lopez   64 y.o. male    Date of Visit: 8/27/2019    Chief Complaint   Patient presents with     Annual Exam     Physical     Subjective  Rui is here for [facility] physical exam.    His main issue is obesity with hypertension and sleep apnea.    Sleep apnea was just diagnosed in May, he reports severe sleep apnea seen on the study.  He was put on CPAP and is now 100% compliant, he is noticed significant improvement with his daytime sleepiness since wearing CPAP.    Working on diet, but has not been able to lose significant weight yet.    No family history of diabetes.    He does have elevated triglycerides of 272 last year.  Cholesterol levels been quite good with an LDL of 111 and HDL 44 and has not been on a statin.    No family history of heart disease although his mother had a stroke.  His father had hypertension.    Patient's hypertension has been well controlled.  On chlorthalidone and spironolactone.    Review lab work from February with a creatinine 1.29.  Potassium 4.1 and normal sodium.    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    Last pulmonary embolism was January 2018 with a saddle embolism.  Hypercoagulable work-up at that time was negative.    Reviewed the CT scan of the chest from January 2018 and there was some questionable left costophrenic angle density, but suspected associated with the pulmonary embolism at the time.    Patient did not have follow-up chest imaging.  He is never smoked.    No cough or respiratory symptoms now, he does not wish to do repeat chest imaging.    I reviewed cardiac echo from May 2018 with an ejection fraction of 56%, no heart valve problems.    No palpitations or history of arrhythmia.    No chest pain or chest pressure.    He is active at work, climb stairs regularly.  Stable and good exertional ability.    No history of asthma or wheezing.   "No sinusitis symptoms.    No orthostasis.    He does wear compression stockings to help with his varicose veins, which is chronic since pulmonary embolism.    No bleeding issues on the Xarelto.  No falls.  No headache issues.    No abdominal pain.  Bowels are regular.  No blood in stool.    April 2016 colonoscopy did show a polyp and has a family history of colon cancer with father.  5-year follow-up plan.    Urinating normally.  PSA was 0.3 last year.    He saw ophthalmology last month, glaucoma controlled and given a 6-month follow-up.  On eyedrops.    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  Has been stable for over a year.  No other joints affected.  He denies previous trauma.  His mother had had osteoarthritis.    No skin changes.    Lives at home with wife, who is a nurse.    The moles on his right cheek have not changed for over 5 years.  No history of skin cancer.    PMHx:    Past Medical History:   Diagnosis Date     High cholesterol      Hypertension      PSHx:    Past Surgical History:   Procedure Laterality Date     CATARACT EXTRACTION Left 2006     CATARACT EXTRACTION Right 2004     CYSTECTOMY  1973    Testicular cystectomy     RETINAL DETACHMENT SURGERY Left 2005     RETINAL DETACHMENT SURGERY Right 2004     Immunizations:   Immunization History   Administered Date(s) Administered     Tdap 08/13/2013       ROS A comprehensive review of systems was performed and was otherwise negative    Medications, allergies, and problem list were reviewed and updated    Exam  /65   Pulse 79   Ht 5' 6.5\" (1.689 m)   Wt (!) 257 lb 8 oz (116.8 kg)   SpO2 96%   BMI 40.94 kg/m    Uniform, well-demarcated brown patch in his right cheek, unchanged.  Also a dermal nevus, well demarcated consistent with nevus or seborrheic keratosis.    Alert and oriented x3 with good mood and affect.    Pupils and irises equal and reactive.  No jaundice or conjunctivitis.  Extraocular " muscles intact.  External ears and nose exam is normal and tympanic membranes are normal.    Mildly crowded pharynx.  No pharyngitis.  Teeth in good condition.  No cervical or supraclavicular or axillary or inguinal adenopathy.    No JVD and no carotid bruits.  No thyromegaly or nodularity.    Lungs are clear to auscultation with normal respiratory excursion.  Heart is regular with no murmur rub or gallop.  Abdomen is obese, nontender.  No pulsatile mass.  Large ventral and periumbilical hernia.  No hepatosplenomegaly.  He does have trace-+1 leg edema to the knee with moderate varicose veins.  Feet in good condition without pre-ulcerative calluses.  No numbness.  +1 posterior tibialis pulses.    Testicles normal bilaterally and no inguinal hernia.  Prostate is small, smooth and symmetric without nodule.    Assessment/Plan  1. Healthcare maintenance  Main issue is obesity, continue to work on weight loss and reduce simple carbohydrates in diet which was discussed.    I recommended more regular walking, but he is active at work.    No exertional cardiac symptoms.  Excellent cholesterol levels, not planning statin.    Glaucoma controlled, six-month follow-up at the end of the year with ophthalmology.    Tetanus shot in 2013    Flu shot this fall    History of elevated triglycerides, work on diet and exercise    2. Essential hypertension  Controlled.  Continue current diuretics.  Patient was warned to avoid regular use of NSAIDs, warnings given.  - chlorthalidone (HYGROTEN) 25 MG tablet; Take 0.5 tablets (12.5 mg total) by mouth daily.  Dispense: 45 tablet; Refill: 2  - spironolactone (ALDACTONE) 25 MG tablet; Take 1 tablet (25 mg total) by mouth daily.  Dispense: 90 tablet; Refill: 2    No plan for statin  - Lipid Cascade    3. History of pulmonary embolism  Long-term Xarelto.    Wear compression stockings for chronic lower extremity edema and varicose veins associated with history of DVT.    Chest CT scan findings in  2018 discussed, he did not want to do repeat chest imaging    4. History of colonic polyps  5 your colonoscopy April 2021    5. Screening for prostate cancer  - PSA (Prostatic-Specific Antigen), Annual Screen    6. Medication monitoring encounter    - Comprehensive Metabolic Panel  - HM2(CBC w/o Differential)    New diagnosis of sleep apnea since this spring.  It was severe per description.  Significant improvement in sleepiness and fatigue since being on CPAP.  100% compliance.  Continue to work on weight loss.    Benign-appearing moles on right cheek have not changed.    Some mild right knuckle osteoarthritic changes, no other osteoarthritic changes.  Moderate suspicion for previous trauma, or over strain with stiffness of the tendons, but is not a trigger finger.  No active inflammation or heat.  Follow-up if that worsens.  Could consider rheumatology referral if significant worsening hand arthritis, in order to rule out inflammatory arthritis.    Return in about 1 year (around 8/27/2020) for Annual physical.   Patient Instructions   Continue to work on weight loss and reducing simple carbohydrates in diet.    Wear compression stockings to help deal with leg swelling and varicose veins.    If significant worsening arthritis, you can request a referral to rheumatology, or return to clinic to discuss.  Avoid regular ibuprofen or Aleve, as that has some risk of affecting kidney function and blood pressure.    Colonoscopy is due April 2021    Follow-up with me in 1 year for physical exam.    Meng Barboza MD      Current Outpatient Medications   Medication Sig Dispense Refill     chlorthalidone (HYGROTEN) 25 MG tablet Take 0.5 tablets (12.5 mg total) by mouth daily. 45 tablet 2     latanoprost (XALATAN) 0.005 % ophthalmic solution USE ONE DROP IN OU D  4     rivaroxaban (XARELTO) 20 mg Tab Take 1 tablet (20 mg total) by mouth every evening. 90 tablet 3     spironolactone (ALDACTONE) 25 MG tablet Take 1 tablet (25 mg  total) by mouth daily. 90 tablet 2     No current facility-administered medications for this visit.      No Known Allergies  Social History     Tobacco Use     Smoking status: Never Smoker     Smokeless tobacco: Never Used   Substance Use Topics     Alcohol use: Yes     Comment: occ beer     Drug use: No

## 2021-05-31 NOTE — PATIENT INSTRUCTIONS - HE
Continue to work on weight loss and reducing simple carbohydrates in diet.    Wear compression stockings to help deal with leg swelling and varicose veins.    If significant worsening arthritis, you can request a referral to rheumatology, or return to clinic to discuss.  Avoid regular ibuprofen or Aleve, as that has some risk of affecting kidney function and blood pressure.    Colonoscopy is due April 2021    Follow-up with me in 1 year for physical exam.

## 2021-05-31 NOTE — TELEPHONE ENCOUNTER
Refill Approved    Rx renewed per Medication Renewal Policy. Medication was last renewed on 11/24/18.    Maria Pickens, [facility] Triage/Med Refill 8/16/2019     Requested Prescriptions   Pending Prescriptions Disp Refills     chlorthalidone (HYGROTEN) 25 MG tablet [Pharmacy Med Name: CHLORTHALIDONE 25MG TABLETS] 45 tablet 0     Sig: TAKE 1/2 TABLET BY MOUTH DAILY.       Diuretics/Combination Diuretics Refill Protocol  Passed - 8/16/2019 10:54 AM        Passed - Visit with PCP or prescribing provider visit in past 12 months     Last office visit with prescriber/PCP: 1/31/2019 Meng Barboza MD OR same dept: 1/31/2019 Meng Barboza MD OR same specialty: 1/31/2019 Meng Barboza MD  Last physical: 7/30/2018 Last MTM visit: Visit date not found   Next visit within 3 mo: Visit date not found  Next physical within 3 mo: Visit date not found  Prescriber OR PCP: Meng Barboza MD  Last diagnosis associated with med order: 1. Essential hypertension  - chlorthalidone (HYGROTEN) 25 MG tablet [Pharmacy Med Name: CHLORTHALIDONE 25MG TABLETS]; TAKE 1/2 TABLET BY MOUTH DAILY.  Dispense: 45 tablet; Refill: 0    If protocol passes may refill for 12 months if within 3 months of last provider visit (or a total of 15 months).             Passed - Serum Potassium in past 12 months      Lab Results   Component Value Date    Potassium 4.1 01/31/2019             Passed - Serum Sodium in past 12 months      Lab Results   Component Value Date    Sodium 139 01/31/2019             Passed - Blood pressure on file in past 12 months     BP Readings from Last 1 Encounters:   07/29/19 106/60             Passed - Serum Creatinine in past 12 months      Creatinine   Date Value Ref Range Status   01/31/2019 1.29 0.70 - 1.30 mg/dL Final

## 2021-06-01 VITALS — BODY MASS INDEX: 35.5 KG/M2 | WEIGHT: 248 LBS | HEIGHT: 70 IN

## 2021-06-01 VITALS — HEIGHT: 70 IN | WEIGHT: 254.2 LBS | BODY MASS INDEX: 36.39 KG/M2

## 2021-06-01 VITALS — BODY MASS INDEX: 36.45 KG/M2 | WEIGHT: 254 LBS

## 2021-06-02 VITALS — WEIGHT: 256 LBS | BODY MASS INDEX: 37.26 KG/M2

## 2021-06-02 VITALS — BODY MASS INDEX: 35.93 KG/M2 | HEIGHT: 70 IN | WEIGHT: 251 LBS

## 2021-06-03 ENCOUNTER — RECORDS - HEALTHEAST (OUTPATIENT)
Dept: ADMINISTRATIVE | Facility: CLINIC | Age: 66
End: 2021-06-03

## 2021-06-03 VITALS — HEIGHT: 70 IN | BODY MASS INDEX: 36.53 KG/M2

## 2021-06-03 VITALS — BODY MASS INDEX: 37.08 KG/M2 | HEIGHT: 70 IN | WEIGHT: 259 LBS

## 2021-06-03 VITALS — HEIGHT: 67 IN | WEIGHT: 257.5 LBS | BODY MASS INDEX: 40.42 KG/M2

## 2021-06-03 VITALS — WEIGHT: 251 LBS | BODY MASS INDEX: 36.53 KG/M2

## 2021-06-04 VITALS — HEIGHT: 67 IN | BODY MASS INDEX: 41.91 KG/M2 | WEIGHT: 267 LBS

## 2021-06-04 VITALS
HEIGHT: 67 IN | BODY MASS INDEX: 41.91 KG/M2 | WEIGHT: 267 LBS | RESPIRATION RATE: 14 BRPM | DIASTOLIC BLOOD PRESSURE: 80 MMHG | SYSTOLIC BLOOD PRESSURE: 130 MMHG | HEART RATE: 72 BPM

## 2021-06-04 VITALS
DIASTOLIC BLOOD PRESSURE: 76 MMHG | HEART RATE: 68 BPM | OXYGEN SATURATION: 97 % | SYSTOLIC BLOOD PRESSURE: 119 MMHG | BODY MASS INDEX: 42.77 KG/M2 | WEIGHT: 269 LBS

## 2021-06-04 VITALS — WEIGHT: 267 LBS | BODY MASS INDEX: 42.45 KG/M2

## 2021-06-04 VITALS
WEIGHT: 272 LBS | HEART RATE: 72 BPM | DIASTOLIC BLOOD PRESSURE: 68 MMHG | BODY MASS INDEX: 43.24 KG/M2 | SYSTOLIC BLOOD PRESSURE: 122 MMHG

## 2021-06-05 VITALS
OXYGEN SATURATION: 96 % | HEART RATE: 76 BPM | WEIGHT: 269.3 LBS | DIASTOLIC BLOOD PRESSURE: 61 MMHG | HEIGHT: 70 IN | SYSTOLIC BLOOD PRESSURE: 130 MMHG | BODY MASS INDEX: 38.55 KG/M2

## 2021-06-05 VITALS
RESPIRATION RATE: 18 BRPM | DIASTOLIC BLOOD PRESSURE: 71 MMHG | SYSTOLIC BLOOD PRESSURE: 106 MMHG | BODY MASS INDEX: 37.14 KG/M2 | TEMPERATURE: 97.7 F | HEART RATE: 92 BPM | OXYGEN SATURATION: 97 % | WEIGHT: 257 LBS

## 2021-06-05 NOTE — PROGRESS NOTES
"[facility] Rehabilitation Daily Progress Note  Patient Name: Rui Lopez  Date of evaluation: 2020  Today's Date: 2020  Visit Number: 3 of 12 per PT POC  PTA #1  Referring provider: Stanley Melgoza CNP  Referral Diagnosis: Acute pain of left shoulder [M25.512]  - Primary   Visit Diagnosis:     ICD-10-CM    1. Neck pain M54.2    2. Acute pain of left shoulder M25.512        Assessment:        Pt is making progress towards initial goals.   Pt with  L shoulder pain after manual therapy.  Tender L upper traps and L levetor.     Patient's expectations/goals are: Realistic  Barriers to Learning or Achieving Goals: none    Goals: On going  Pt. will demonstrate/verbalize independence in self-management of condition in : 12 weeks    Pt will: improve left periscapular pain in 12 weeks  Pt will: improve neck extension without left periscapular pain in 12 weeks         Plan:        Plan for next visit: continue with manual therapy as indicated. Continue to work on periscap strengthening, add neck flexion to neck ROM HEP. Assess response to k tape.    Continue per POC.  Patient is in agreement with PT plan of care and goals.        Subjective:    Pt reports \" I can still feel it.\" Pt reports his L shoulder is sore when he is walking with his arm down. Pt reports his L posterior shoulder \"throbs.\"  Pt has been compliant with his HEP 3x/day.  Pt reports he is tolerating lying on his back.  Pt reports he is working on his posture.   Pt reports he removed the Kinesiotape that night due to skin irritation.  Pt reported decreased L shoulder pain after manual therapy today.          Functional limitations:   Looking up, turning head to right, walking         Objective:   Pt with mild pain with end range extension.   Mild tenderness to left levator attachment    Pain in left shoulder/neck with neck extension. This improves greatly when his posture is corrected by PT.       Exercises:  Exercise #1: scap " rolls  Comment #1: unilateral pec stretch  Exercise #2: neck rotation repeats  Comment #2: neck extension repeats  Exercise #3: ed on tennis ball for soft tissue massage  Exercise #4: seated neck flexion  Comment #4: x10    Treatment Today    TREATMENT MINUTES COMMENTS   Evaluation     Self-care/ Home management     Manual therapy 20 Patient supine left levator, left UT, rhomboids, levator myofascial release.   Manual scap retraction   Neuromuscular Re-education  Pt declined declined K tape today due to skin irritataion   Therapeutic Activity     Therapeutic Exercises 8 See above  Added to HEP:  -seated neck flexion   Gait training     Modality__________________                Total 28    Blank areas are intentional and mean the treatment did not include these items.          Mari Valdez, PTA, CLT  1/30/2020

## 2021-06-05 NOTE — PROGRESS NOTES
[facility] Rehabilitation Daily Progress Note  Patient Name: Rui Lopez  Date of evaluation: 1/14/2020  Today's Date: 2/6/2020  Visit Number: 4 of 12 per PT POC  PTA #1  Referring provider: Stanley Melgoza CNP  Referral Diagnosis: Acute pain of left shoulder [M25.512]  - Primary   Visit Diagnosis:     ICD-10-CM    1. Neck pain M54.2    2. Acute pain of left shoulder M25.512        Assessment:      At this time in PT, patient states his pain is possibly a little better, but he still has times where he is in pretty significant discomfort.   His symptoms are: left arm tingling/aching with neck extension and neck right rotation/right sidebending.   He has tightness in his left levator and periscapular muscles.   His posture is protracted shoulders and forward head posture.   Signs/symptoms consistent with cervical radicular pain.   He may benefit from returning to referring provider next week to discuss further treatment options/imaging if indicated.   Did discuss with patient today that he should continue with exercises, work on improving posture and that this can take a while to heal.   He doesn't have any red flag signs at this time.     Patient's expectations/goals are: Realistic  Barriers to Learning or Achieving Goals: none    Goals: On going  Pt. will demonstrate/verbalize independence in self-management of condition in : 12 weeks    Pt will: improve left periscapular pain in 12 weeks  Pt will: improve neck extension without left periscapular pain in 12 weeks         Plan:        Plan for next visit: continue with manual therapy as indicated. Review ex as needed.     Continue per POC.  Patient is in agreement with PT plan of care and goals.        Subjective:      Still feeling left periscapular pain and left arm tingling with neck movements.   Ice helps a lot.   Can still work and sleep just fine.   Walking with arm hanging at its side is painful.      Functional limitations:   Looking up, turning head to  right, walking     Objective:     Pain in left arm with neck extension  Pain in left levator with right neck sidebending and right neck rotation  Sharp pain in left thumb at times as well.   Pain in left arm when hanging at side, +abduction sign.     Negative rotator cuff tests, no pain in left shoulder with shoulder range of motion testing.     Negative Sullivan's    Normal sensation on bilat UEs to light touch.     Exercises: see flowshee for date performed in clinic  Exercise #1: scap rolls  Comment #1: unilateral pec stretch  Exercise #2: neck rotation repeats  Comment #2: neck extension repeats  Exercise #3: ed on tennis ball for soft tissue massage  Exercise #4: seated neck flexion  Comment #4: x10    Treatment Today    TREATMENT MINUTES COMMENTS   Evaluation     Self-care/ Home management     Manual therapy 20 Patient seated: left levator, left UT, rhomboids, levator myofascial release.   Manual scap retraction   Neuromuscular Re-education     Therapeutic Activity     Therapeutic Exercises 8 Reviewed ex and remeasured current status.   Gait training     Modality__________________                Total 28    Blank areas are intentional and mean the treatment did not include these items.          Hannah Wilkes, PT, CLT  2/6/2020

## 2021-06-05 NOTE — PROGRESS NOTES
Clinic Note    Assessment:     Assessment and Plan:  1. Acute pain of left shoulder  He was worried about a possible rotator cuff tendinopathy.  I told him I did not think that this was a rotator cuff issue but was unsure of the exact cause.  Regardless, I think that this should hopefully respond to physical therapy.  He will use Tylenol 3 and Voltaren gel.  - diclofenac sodium (VOLTAREN) 1 % Gel; Apply 2 g to the shoulder 3 times daily  Dispense: 1 Tube; Refill: 0  - acetaminophen-codeine (TYLENOL-CODEINE #3) 300-30 mg per tablet; Take 1 tablet by mouth every 6 (six) hours as needed for pain.  Dispense: 22 tablet; Refill: 0       Patient Instructions   I will send in a prescription for Tylenol No. 3.  You should take 1 tablet every 6 hours as needed for pain.    Use the Voltaren gel on your shoulder 3 times daily.    Continue with heat.    I think this will respond with physical therapy.  Call [phone]    Follow-up with Dr. Meng Barboza for routine checkup in 3 to 6 months.    Return in about 3 months (around 4/8/2020).         Subjective:      Rui Lopez is a 64 y.o. male who comes to the clinic today with shoulder/neck pain.    He localizes the pain to his left medial scapular area.  Says that it started a few weeks ago.  He denies any acute trauma or injury to the area.  Unsure if he slept awkwardly-says that he sleeps on his back most nights comfortably with his CPAP machine without difficulty.    He does not have any numbness or tingling in his upper extremities.    Pain is worse when he tilts his head and chin upward.    Pain is unrelieved with Tylenol.  He cannot use systemic NSAIDs due to his Xarelto medication.    The following portions of the patient's history were reviewed and updated as appropriate: Allergies, medications, problems, prior note.    Review of Systems:    Review is otherwise negative except for what is mentioned above.     Social Hx:    Social History     Tobacco Use   Smoking  Status Never Smoker   Smokeless Tobacco Never Used         Objective:     Vitals:    01/08/20 1523   BP: 122/68   Pulse: 72   Weight: (!) 272 lb (123.4 kg)       Exam:    General: No apparent distress. Calm. Alert and Oriented X3. Pt behavior is appropriate.  Musculoskeletal: Pain with palpation to the left medial scapular area.  Left shoulder range of motion is largely intact with negative George/Neer's  Neurologic: Interactive, alert, no focal findings, CNs intact.   Skin: Warm, dry. Normal hair pattern. Free of lesions. Normal skin turgor.       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 45 tablet 2     latanoprost (XALATAN) 0.005 % ophthalmic solution USE ONE DROP IN OU D  4     rivaroxaban (XARELTO) 20 mg Tab Take 1 tablet (20 mg total) by mouth every evening. 90 tablet 3     spironolactone (ALDACTONE) 25 MG tablet Take 1 tablet (25 mg total) by mouth daily. 90 tablet 2     acetaminophen-codeine (TYLENOL-CODEINE #3) 300-30 mg per tablet Take 1 tablet by mouth every 6 (six) hours as needed for pain. 22 tablet 0     diclofenac sodium (VOLTAREN) 1 % Gel Apply 2 g to the shoulder 3 times daily 1 Tube 0     No current facility-administered medications for this visit.            Dane Melgoza (Rob), SOC[place]    1/8/2020

## 2021-06-05 NOTE — PROGRESS NOTES
[facility] Rehabilitation  Shoulder Initial Evaluation  Patient Name: Rui Lopez  Date of evaluation: 1/14/2020  Today's Date: 1/14/2020  Visit Number: 1 of 12 per PT POC  Referring provider: Stanley Melgoza CNP  Referral Diagnosis: Acute pain of left shoulder [M25.512]  - Primary   Visit Diagnosis:     ICD-10-CM    1. Neck pain M54.2    2. Acute pain of left shoulder M25.512        Assessment:        Rui is a 64 y.o. male who presents to physical therapy today with complaints of left levator/periscap musculature pain that began about 3 weeks ago without specific injury. Clinical exam today reveals decreased neck ROM with symptoms, negative rotator cuff testing and full shoulder ROM. Patient's signs/symptoms are more consistent with neck dysfunction vs. Shoulder dysfunction. Patient would benefit from further PT in order to improve function.     Patient's expectations/goals are: Realistic  Barriers to Learning or Achieving Goals: none    Goals:  Pt. will demonstrate/verbalize independence in self-management of condition in : 12 weeks    Pt will: improve left periscapular pain in 12 weeks  Pt will: improve neck extension without left periscapular pain in 12 weeks         Plan:        Plan for next visit: continue with manual therapy as indicated. Continue to work on periscap strengthening, add neck flexion to neck ROM HEP.    Plan of Care:   Communication with: Referral Source  Patient Related Instruction: Nature of Condition;Treatment plan and rationale;Self Care instruction;Basis of treatment;Body mechanics;Posture  Times per Week: 1-2  Number of Weeks: 12  Number of Visits: 12  Discharge Planning: to self care  Precautions / Restrictions : none  Therapeutic Exercise: ROM;Stretching;Strengthening  Neuromuscular Reeducation: posture;kinesio tape;core  Manual Therapy: soft tissue mobilization;myofascial release;joint mobilization;muscle energy  Modalities: electrical stimulation  Equipment:  theraband    Patient is in agreement with PT plan of care and goals.        Subjective:         History Of Present Illness:  Rui is a 64 y.o. male who presents to physical therapy today with complaints of left periscap lateral shoulder pain and tingling that started about 3 weeks ago without known injury.   Seated at rest, can feel pain/tingling in left periscap/lateral shoulder and worse with looking up. Also notices symptoms when walking with arm hanging down at side.   Sleeping is just fine. Mornings, has stiffness, but not significant pain.   Has been trying Tylenol 3 with codeine  Trying voltaren gel  Hasn't had issue like this in the past.    Social information:                  Occupation: Maintenance work, still working just fine.    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  Pain rating at worst: pretty significant at times where it feels like a spasm.     Associated symptoms:                         Numbness: no                        Weakness: no                        Bladder or Bowel loss of control: no                        Fever and/or Chills: no     Functional limitations:   Looking up, turning head to right, walking         Objective:      Note: Items left blank indicates the item was not performed or not indicated at the time of the evaluation.    Shoulder Examination  1. Neck pain     2. Acute pain of left shoulder       Precautions/Restrictions: None  Involved side: Left  Posture Observation:      General sitting posture is  fair.  Cervical Clearing: Abnormal when moving head, it  affects the shoulder.   Less ROM to the right and symptoms  Decreased ROM with neck extension and symptoms    UE ROM    Shoulder flexion: full ROM  Shoulder abduction: full ROM  Shoulder IR: reaching behind back, he notices it in left periscap.    UE Strength   Shoulder flexion: 5/5  Shoulder abduction: 5/5   Shoulder ER: 5/5  Shoulder IR: 5/5  Elbow Ext: 5/5  Elbow Flexion 5/5:  Wrist Flexion: 5/5  Wrist Ext: 5/5    Shoulder Special Tests  George-Urbano: negative  Painful Arc: negative  Cross body Adduction: negative  Sulcus Sign:  Drop arm sign:  Speed's:    Flexibility: decreased pec mobility    Palpation: tender to left levator, rhomboids    Exercises:  Exercise #1: scap rolls  Comment #1: unilateral pec stretch  Exercise #2: neck rotation repeats  Comment #2: neck extension repeats  Exercise #3: ed on tennis ball for soft tissue massage    Treatment Today    TREATMENT MINUTES COMMENTS   Evaluation 20 Left shoulder evaluation   Self-care/ Home management 10 Patient supine: left levator, rhomboids STM and manual cervical distraction   Manual therapy     Neuromuscular Re-education     Therapeutic Activity     Therapeutic Exercises 15 Instruction on exercises to start performing   Gait training     Modality__________________                Total 45    Blank areas are intentional and mean the treatment did not include these items.     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)     Adenomatous colon polyp (04/2016)     Pulmonary emboli (H)     History of pulmonary embolism     History of colonic polyps     Medication monitoring encounter     Obesity (BMI 35.0-39.9) with comorbidity (H)   Examination: shoulder pain  Clinical Presentation: stable  Clinical Decision Making: low    Patient History/  Comorbidities Examination  (body structures and functions,  activity limitations, and/or participation restrictions) Clinical Presentation Clinical Decision Making (Complexity)   No documented Comorbidities or personal factors 1-2 Elements Stable and/or uncomplicated Low   1-2 documented comorbidities or personal factor 3 Elements Evolving clinical presentation with changing characteristics Moderate   3-4 documented comorbidities or personal factors 4 or more Unstable and unpredictable High                Hannah Wilkes, PT, CLT  1/14/2020

## 2021-06-05 NOTE — PROGRESS NOTES
[facility] Rehabilitation Daily Progress Note  Patient Name: Rui Lopez  Date of evaluation: 1/14/2020  Today's Date: 1/23/2020  Visit Number: 2 of 12 per PT POC  Referring provider: Stanley Melgoza CNP  Referral Diagnosis: Acute pain of left shoulder [M25.512]  - Primary   Visit Diagnosis:     ICD-10-CM    1. Neck pain M54.2    2. Acute pain of left shoulder M25.512        Assessment:      Rui has had a mild improvement in symptoms since a week ago. Still has left shoulder/arm aching/tingling when walking with arm hanging down and when extending head backward.   Today, he was less tender to palpation and responded well to manual therapy and kinesiotape. He will continue with home exercises and we will continue to progress as tolerated.    Patient's expectations/goals are: Realistic  Barriers to Learning or Achieving Goals: none    Goals:  Pt. will demonstrate/verbalize independence in self-management of condition in : 12 weeks    Pt will: improve left periscapular pain in 12 weeks  Pt will: improve neck extension without left periscapular pain in 12 weeks         Plan:        Plan for next visit: continue with manual therapy as indicated. Continue to work on periscap strengthening, add neck flexion to neck ROM HEP. Assess response to k tape.    Continue per POC.  Patient is in agreement with PT plan of care and goals.        Subjective:       Patient is still pretty sore, feels the tingling in he left arm at times.   The worst is laying flat on back.   Patient is still working, doesn't bother during work.      Functional limitations:   Looking up, turning head to right, walking         Objective:       Mild tenderness to left levator attachment    Pain in left shoulder/neck with neck extension. This improves greatly when his posture is corrected by PT.       Exercises:  Exercise #1: scap rolls  Comment #1: unilateral pec stretch  Exercise #2: neck rotation repeats  Comment #2: neck extension  repeats  Exercise #3: ed on tennis ball for soft tissue massage    Treatment Today    TREATMENT MINUTES COMMENTS   Evaluation     Self-care/ Home management     Manual therapy 20 Patient seated: left levator, left UT, rhomboids myofascial release.   Manual scap retraction   Neuromuscular Re-education 8 k tap applied to left scap to promote retraction, double 3 inch strip.   Instructed to wear for 2-3 days. To remove if uncomfortable or itchy   Therapeutic Activity     Therapeutic Exercises     Gait training     Modality__________________                Total 28    Blank areas are intentional and mean the treatment did not include these items.          Hannah Wilkes, PT, CLT  1/23/2020

## 2021-06-05 NOTE — PATIENT INSTRUCTIONS - HE
I will send in a prescription for Tylenol No. 3.  You should take 1 tablet every 6 hours as needed for pain.    Use the Voltaren gel on your shoulder 3 times daily.    Continue with heat.    I think this will respond with physical therapy.  Call [phone]    Follow-up with Dr. Meng Barboza for routine checkup in 3 to 6 months.

## 2021-06-05 NOTE — TELEPHONE ENCOUNTER
Question following Office Visit  When did you see your provider: 1/8/2020 Dane Melgoza CNP   What is your question: I was seen yesterday and was give the following information: I think this will respond with physical therapy.  Call [phone]  I did call them as instructed and they stated a referral is required.  Please enter this referral ASAP  Okay to leave a detailed message: Yes

## 2021-06-06 NOTE — PROGRESS NOTES
[facility] Rehabilitation Daily Progress Note  Patient Name: Rui Lopez  Date of evaluation: 2020  Today's Date: 2020  Visit Number: 5 of 12 per PT POC  PTA #2  Referring provider: Stanley Melgoza CNP  Referral Diagnosis: Acute pain of left shoulder [M25.512]  - Primary   Visit Diagnosis:     ICD-10-CM    1. Neck pain M54.2    2. Acute pain of left shoulder M25.512        Assessment:      At this time in PT, patient states his pain is possibly a little better, but he still has times where he is in pretty significant discomfort.   His symptoms are: left arm tingling/aching with neck extension and neck right rotation/right sidebending.   He has tightness in his left levator and periscapular muscles.   His posture is protracted shoulders and forward head posture.   Signs/symptoms consistent with cervical radicular pain.         Patient's expectations/goals are: Realistic  Barriers to Learning or Achieving Goals: none    Goals: On going  Pt. will demonstrate/verbalize independence in self-management of condition in : 12 weeks    Pt will: improve left periscapular pain in 12 weeks  Pt will: improve neck extension without left periscapular pain in 12 weeks         Plan:        No further visits have been scheduled at this time. Pt will have a MRI tomorrow and FU with Dr. Oscar next .        Subjective:    Pt reports he does not feel that there hs been much progression.  Pt to have a MRI tomorrow evening. Pt will FU with Dr. Oscar next .  Pt reports intermittent tingling. This happens mostly when arm is down when walking.  His exercises 3x/day.  Pt has been doing   Pain ratin/10 currently        Functional limitations:   Looking up, turning head to right, walking     Objective:   Pt continues to have:  Pain in left arm with neck extension  Pain in left levator with right neck sidebending and right neck rotation    Pain in left arm when hanging at side, +abduction sign.      Negative rotator cuff tests, no pain in left shoulder with shoulder range of motion testing.     Negative Sullivan's        Exercises: see flowshee for date performed in clinic  Exercise #1: scap rolls  Comment #1: unilateral pec stretch  Exercise #2: neck rotation repeats  Comment #2: neck extension repeats  Exercise #3: ed on tennis ball for soft tissue massage  Exercise #4: seated neck flexion  Comment #4: x10    Treatment Today    TREATMENT MINUTES COMMENTS   Evaluation     Self-care/ Home management     Manual therapy 20 Patient supine left levator, left UT, rhomboids, levator myofascial release.   Manual scap retraction   Neuromuscular Re-education     Therapeutic Activity     Therapeutic Exercises 8 Reviewed ex objective and subjective measures taken   Gait training     Modality__________________                Total 28    Blank areas are intentional and mean the treatment did not include these items.          Mari Valdez, PTA, CLT  2/13/2020

## 2021-06-06 NOTE — PROGRESS NOTES
Clinic Note    Assessment:     Assessment and Plan:  1. Cervicalgia  He has had about 6 rounds of PT right now and says that his symptoms are about 30% improved.  We will proceed with an MRI of his cervical spine and refer him on to spine clinic for further evaluation/treatment.  - MR Cervical Spine Without Contrast; Future  - Ambulatory referral to [facility]       Patient Instructions   Call [phone] to schedule your MRI of your neck.    Call the spine clinic and set up an appointment with Dr. Santana.  [phone]    We will hold off on prescribing steroids for your neck right now.    Continue with physical therapy.    Tylenol for pain.    Continue with ice/heat.    Return in about 3 months (around 5/10/2020).         Subjective:      Patient comes to clinic today for follow-up.    I last saw him on 1/8 for some pain in his left shoulder.  He had denied any numbness or tingling in his upper extremities.  We decided to start PT.    He has had multiple rounds of physical therapy now and says that he is about 30% improved in terms of his pain levels.    He is using Tylenol for pain now, and is questioning if a Medrol Dosepak would be of benefit.    Pain is persistent, rated at 3 out of 10.  He is doing his PT exercises as instructed by [facility].  Using his CPAP machine without difficulty.    Denies any focal neurological deficits.    The following portions of the patient's history were reviewed and updated as appropriate: Allergies, medications, problem list, prior note.     Review of Systems:    Review is otherwise negative except for what is mentioned above.     Social Hx:    Social History     Tobacco Use   Smoking Status Never Smoker   Smokeless Tobacco Never Used         Objective:     Vitals:    02/10/20 1450   BP: 119/76   Pulse: 68   SpO2: 97%   Weight: (!) 269 lb (122 kg)       Exam:    General: No apparent distress. Calm. Alert and Oriented X3. Pt behavior is appropriate.  Neck: Left shoulder pain  is worse with neck flexion and extension.        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 22 tablet 0     chlorthalidone (HYGROTEN) 25 MG tablet Take 0.5 tablets (12.5 mg total) by mouth daily. 45 tablet 2     diclofenac sodium (VOLTAREN) 1 % Gel Apply 2 g to the shoulder 3 times daily 1 Tube 0     latanoprost (XALATAN) 0.005 % ophthalmic solution USE ONE DROP IN OU D  4     rivaroxaban (XARELTO) 20 mg Tab Take 1 tablet (20 mg total) by mouth every evening. 90 tablet 3     spironolactone (ALDACTONE) 25 MG tablet Take 1 tablet (25 mg total) by mouth daily. 90 tablet 2     No current facility-administered medications for this visit.              Dane Melgoza (Rob), CNP    2/10/2020

## 2021-06-06 NOTE — PROGRESS NOTES
Neurosurgery consultation was requested by: Dr. Oscar for evaluation of cervical spinal stenosis  Pain: presents in the neck as a not positional ache started gradually last December with no triggers  Radicular Pain is present: shoulder blade and shoulder  Lhermitte sign: denies  Motor complaints: denies  Sensory complaints: numbness in the left deltoid and tingling in the left forearm  Gait and balance issues: absent  Bowel or bladder issues: denies  Duration of SX is: chronic  The symptoms are worse with: ROM  The symptoms are better with: rest  Injury: denies  Severity is: mild to moderate  Patient has tried the following conservative measures: PT, Gabapentin  NDI score is : 16%  Nicole Greene, RN, CNRN

## 2021-06-06 NOTE — TELEPHONE ENCOUNTER
I have discussed his MRI with neurosurgery.  After further review, I recommend obtaining a CT scan of the cervical spine to further evaluate the disc herniation and surrounding vertebral levels.  He can have this done prior to his follow-up

## 2021-06-06 NOTE — PATIENT INSTRUCTIONS - HE
1. Monitor symptoms.  If you notice anything change with coordination or worsening pain call.    2. Try gabapentin for your nerve pain:    Prescribed Gabapentin today, 100 mg tablets, to be titrated up to 3 tablets 3 times a day as tolerated for your nerve pain. Please follow Gabapentin dosing chart below.    Gabapentin 100mg Dosing Chart    DATE  MORNING AFTERNOON BEDTIME    Day 1 0 0 1    Day 2 0 0 1    Day 3 0 0 1    Day 4 1 0 1    Day 5 1 0 1    Day 6 1 0 1    Day 7 1 1 1    Day 8 1 1 1    Day 9 1 1 1    Day 10 1 1 2    Day 11 1 1 2    Day 12 1 1 2    Day 13 2 1 2    Day 14 2 1 2    Day 15 2 1 2    Day 16 2 2 2    Day 17 2 2 2    Day 18 2 2 2    Day 19 2 2 3    Day 20 2 2 3    Day 21 2 2 3    Day 22 3 2 3    Day 23 3 2 3    Day 24 3 2 3    Day 25 3 3 3    Day 26 3 3 3    Day 27 3 3 3     Continue medication, taking 3 capsules three times daily    Please call if you have any questions regarding how to take your medication  Clinic Phone # [phone]

## 2021-06-06 NOTE — PATIENT INSTRUCTIONS - HE
Call [phone] to schedule your MRI of your neck.    Call the spine clinic and set up an appointment with Dr. Santana.  [phone]    We will hold off on prescribing steroids for your neck right now.    Continue with physical therapy.    Tylenol for pain.    Continue with ice/heat.

## 2021-06-06 NOTE — PROGRESS NOTES
"NEUROSURGERY CONSULTATION NOTE    3/12/2020       CHIEF COMPLAINT: Left posterior shoulder pain    HPI:    Rui Lopez is a 65 y.o. male who is sent to us in consultation by Stephan Oscar DO for evaluation of cervical stenosis.    Onset: His symptoms began around 12/25/2019. Initially he believed that his symptoms began after \"sleeping wrong\".     Pain: He endorses left posterior shoulder pain that radiates to his lateral shoulder and occasionally \"shoots down\" to his left hand. Additionally, he endorses left lateral shoulder down to his left forearm tingling. He has not received steroid injections yet. He has been taking 100 mg of gabapentin two times a day. He went to physical therapy four times with very mild relief. He is on Xarelto due to previous pulmonary embolism a couple years ago. He denies any recent falls or stumbles. He denies weakness, right arm symptoms,bowel or bladder incontinence, imbalance, or  incoordination. No other complaints or concerns expressed at this time.    Aggravating factors: When his left arm is hanging down at the side his pain is provoked.  Relieving factors: Laying down and holding his left arm close to his body palliates his pain.     Pain management: He has been taking 100 mg of gabapentin two times a day with mild relief.     Past Medical History:   Diagnosis Date     DVT (deep vein thrombosis) in pregnancy      High cholesterol      History of pulmonary embolus (PE)      Hypertension      Leg swelling      Numbness and tingling      Sarcoidosis      Past Surgical History:   Procedure Laterality Date     CATARACT EXTRACTION Left 2006     CATARACT EXTRACTION Right 2004     CYSTECTOMY  1973    Testicular cystectomy     RETINAL DETACHMENT SURGERY Left 2005     RETINAL DETACHMENT SURGERY Right 2004         REVIEW OF SYSTEMS:  Pt denies weakness, right arm symptoms, bowel or bladder incontinence, imbalance, or  incoordination. Pt notes + left posterior shoulder pain that " "radiates to his lateral shoulder and occasionally \"shoots down\" to his left hand and left lateral shoulder down to his left forearm tingling. A full 14 point review of systems was otherwise completed and is negative aside from that mentioned above in the HPI    MEDICATIONS:    Current Outpatient Medications   Medication Sig Dispense Refill     acetaminophen-codeine (TYLENOL-CODEINE #3) 300-30 mg per tablet Take 1 tablet by mouth every 6 (six) hours as needed for pain. 22 tablet 0     chlorthalidone (HYGROTEN) 25 MG tablet Take 0.5 tablets (12.5 mg total) by mouth daily. 45 tablet 2     diclofenac sodium (VOLTAREN) 1 % Gel Apply 2 g to the shoulder 3 times daily 1 Tube 0     gabapentin (NEURONTIN) 100 MG capsule 1 cap at bedtime x 3 days, then may take 1 cap twice daily x 3 days, then may take 1 cap three times daily 90 capsule 2     latanoprost (XALATAN) 0.005 % ophthalmic solution USE ONE DROP IN OU D  4     rivaroxaban (XARELTO) 20 mg Tab Take 1 tablet (20 mg total) by mouth every evening. 90 tablet 3     spironolactone (ALDACTONE) 25 MG tablet Take 1 tablet (25 mg total) by mouth daily. 90 tablet 2     No current facility-administered medications for this visit.          ALLERGIES/SENSITIVITIES:     No Known Allergies    PERTINENT SOCIAL HISTORY:   Social History     Socioeconomic History     Marital status:      Spouse name: None     Number of children: None     Years of education: None     Highest education level: None   Occupational History     None   Social Needs     Financial resource strain: None     Food insecurity     Worry: None     Inability: None     Transportation needs     Medical: None     Non-medical: None   Tobacco Use     Smoking status: Never Smoker     Smokeless tobacco: Never Used   Substance and Sexual Activity     Alcohol use: Yes     Comment: occ beer     Drug use: No     Sexual activity: None   Lifestyle     Physical activity     Days per week: None     Minutes per session: None     " "Stress: None   Relationships     Social connections     Talks on phone: None     Gets together: None     Attends Confucianist service: None     Active member of club or organization: None     Attends meetings of clubs or organizations: None     Relationship status: None     Intimate partner violence     Fear of current or ex partner: None     Emotionally abused: None     Physically abused: None     Forced sexual activity: None   Other Topics Concern     None   Social History Narrative     None         FAMILY HISTORY:  Family History   Problem Relation Age of Onset     Hypothyroidism Mother      Leukemia Mother      Arthritis Mother      Hypertension Father      Aortic aneurysm Father         AAA     Colon polyps Father      Kidney cancer Maternal Grandfather         PHYSICAL EXAM:     Constitution: /80   Pulse 72   Resp 14   Ht 5' 6.5\" (1.689 m)   Wt (!) 267 lb (121.1 kg)   BMI 42.45 kg/m  .   Awake, alert and in NAD  Eyes: Conjugate gaze. Conjunctiva benign without icterus or injection  Heart: RRR  Lungs: Non-labored respiration without accessory muscle use  Skin: No obvious rash or lesion  Psych: Appropriate mood and affect, alert and oriented x 3  Mental Status:  Speech is fluent.  Recent and remote memory are intact.  Attention span and concentration are normal.     Cranial Nerves:  CN2: No funduscopic exam performed. CN3,4 & 6: Pupillary light response, lateral and vertical gaze normal.  No nystagmus. CN7: No facial weakness, smile, facial symmetry intact. CN8: Intact to spoken voice.      Motor: No pronator drift of upper extremity. Normal bulk and tone all muscle groups of upper and lower extremities. Strength is 5/5 in all muscle groups of the bilateral upper and lower extremities      Sensory: Sensation intact bilaterally to light touch and temperature throughout. Sensation is intact to pinprick in the bilateral LE. Vibratory sense is intact in the bl great toe.     Coordination:  Gait is WNL. Pt is " able to heel and toe walk without difficulty.    Tandem gait is WNL. RUTHANN in the UE is WNL     Reflexes; 2+ supinator, biceps, triceps in the left. 3 supinator, biceps, triceps in the right. 2+ patellar and achilles. No ojeda. No clonus. Toes are down-going bilaterally    Musculoskeletal: Negative straight leg raise bilaterally. Negative YANETH testing. Negative Ana finger test    IMAGING: I personally reviewed all radiographic images  Cervical spine MRI 2/14/2020: Patient has multilevel cervical spondylosis with congenitally small central canal.  He has general straightening of the cervical spine with general slight kyphotic curve.  At C3-4, there is a small paracentral osteophyte complex which abuts and mildly deforms the ventral cervical cord.  Patient has moderate to severe foraminal stenosis bilaterally.  At C4-5, there is a broad-based disc osteophyte complex contributing to moderate central canal stenosis, severe left foraminal stenosis.  Beginning at the upper level of the C5 vertebral body, there is a large calcified, likely ossified posterior longitudinal ligament fragment which is causing severe deformation of the cervical cord, severe central canal stenosis there appears to be severe left foraminal stenosis.  This large osteophyte complex extends beyond the C6-7 disc space to about the level of the inferior C7 body but continues within the right portion of the spinal canal.  Patient has moderate to severe foraminal stenosis on the left at C6-7.    Cervical spine x-ray 2/27/2020: General kyphotic curvature neutral alignment.  Patient has 13 degrees of cervical kyphosis approximately however visualizing the inferior aspect of the C7 vertebral body is limited by body habitus.    CT cervical spine 2/25/2020: There is proximal ossification of the posterior longitudinal ligament at the level of C3-4 which is causing some mild central canal stenosis he has severe left, moderate to severe right foraminal  stenosis.  C4-5, there is moderate foraminal stenosis on the left.  Beginning at the level of the C5 vertebral body extending into the right central canal there is a large osteophyte causing severe narrowing and inevitable compression of the cervical cord.  This extends down to the inferior aspect of the C6 vertebral body.  At the level of C6-7, there is additional osteophytic complex associated with the upper C7 vertebral body causing some mild to moderate central canal stenosis.  Patient has left severe foraminal stenosis at C5-6, C6-7.    CONSULTATION ASSESSMENT AND PLAN:    Rui Lopez is a 65 y.o. male who has evidence of posterior longitudinal ligament ossification with a large osteophyte complex within the right central canal causing severe spinal cord compression who has signs and symptoms of left cervical radiculopathy      I spent more than 60 minutes in this apt, examining the pt, reviewing the scans, reviewing notes from chart, discussing treatment options with risks and benefits and coordinating care. >50 % clinic time was spent in face to face counseling and coordinating care    Ronda Manuel MD      Cc:   Meng Barboza MD    I, Yoel Durán, am serving as a scribe to document services personally performed by Dr. Ronda Manuel MD, based on my observation and the provider's statements to me. I, Ronda Manuel MD attest that Yoel Durán is acting in a scribe capacity, has observed my performance of the services and has documented them in accordance with my direction.

## 2021-06-06 NOTE — TELEPHONE ENCOUNTER
Phone call to patient to review results and provider's recommendations. Results given and explained. Discussed PSP's recommendation to be seen per Dr. Manuel of [facility]. Stated understanding. Contact information provided. He is aware he will be called to arrange an appointment.

## 2021-06-06 NOTE — PROGRESS NOTES
Assessment/Plan:      Diagnoses and all orders for this visit:    Cervical radicular pain  -     gabapentin (NEURONTIN) 100 MG capsule; 1 cap at bedtime x 3 days, then may take 1 cap twice daily x 3 days, then may take 1 cap three times daily  Dispense: 90 capsule; Refill: 2    Cervical disc herniation  -     gabapentin (NEURONTIN) 100 MG capsule; 1 cap at bedtime x 3 days, then may take 1 cap twice daily x 3 days, then may take 1 cap three times daily  Dispense: 90 capsule; Refill: 2    Cervical stenosis of spine  -     gabapentin (NEURONTIN) 100 MG capsule; 1 cap at bedtime x 3 days, then may take 1 cap twice daily x 3 days, then may take 1 cap three times daily  Dispense: 90 capsule; Refill: 2    Myofascial pain        Assessment: Pleasant 64 y.o. male with a history of hypertension, hyperlipidemia, pulmonary embolism on Xarelto with:    1.  2-month history of significant cervical spine and left cervical radicular pain in a C5-6 distribution.  He has a large superiorly directed right paracentral disc extrusion with severe spinal stenosis at C5-6 and flattening of the spinal cord along with a right paracentral disc extrusion C6-7.  This may also represent hematoma or blood product at these levels.  He is not myelopathic his symptoms are slowly improving.    2.  Myofascial pain left parascapular region.    Discussion:    1. *I discussed the diagnosis and treatment options.  Is currently not myelopathic and does have symptoms with looking up where he will have radicular pain in the left arm otherwise he is doing well.  We educated him on treatment options such as medications monitoring symptoms surgical referral and injection.  At this time his symptoms are slowly improving and we discussed giving him some medications to help with his pain in the meantime and activity modifications.  He agrees with this plan.    2.  Trial gabapentin 100 mg increasing to 3 times daily as tolerated.    3.  Activity modifications.  He  should continue with his home exercises but avoid anything that causes him pain such as looking up.    4.  I educated him on central cord syndrome and cervical myelopathy and if he has any changes or decompensation he will contact us immediately and we can make further recommendations at that time.    5.  Given the severity of this disc herniation and central stenosis I will see him back in 3 weeks time for reevaluation.      It was our pleasure caring for your patient today, if there any questions or concerns please do not hesitate to contact us.      Subjective:   Patient ID: Rui Lopez is a 64 y.o. male.    History of Present Illness: Patient presents at the request of Dr. Meng Barboza and Dane Melgoza CNP for evaluation of cervical spine pain and left arm pain and paresthesias.  His pain started about 2 months ago.  No trauma.  He had severe left cervical spine parascapular pain with left arm pain down to the elbow and occasionally to the wrist.  Has been slowly improving.  He was seen by his primary care provider and given Tylenol 3 has been working in physical therapy doing home exercises.  He does still have neck pain and parascapular pain worse with looking up as well as bending forward and reaching.  Better with sitting with his left arm over his head or behind his head and sitting with his arm supported.  He has no weakness in the left arm.  No balance changes or coordination problems.  His pain is tolerable at this time rated a 9/10 at worst 2/10 today and at best.  No symptoms in the right arm.      Imaging: MRI report and images were personally reviewed and discussed with the patient.  A plastic model was utilized during the discussion.  MRI of the cervical spine personally reviewed this reveals multilevel degenerative disc disease mid cervical spine.  Most significant finding C5-6 on the right there is a paracentral disc extrusion which is quite large compressing and shifting the spinal cord with  severe central stenosis.  There is also a right paracentral disc extrusion at C6-7.  I question if this is a hematoma as well.    Prior interventions:  Physical therapy    Review of Systems: Complains of some joint pain muscle pain, swelling in the left leg wears compression stockings.  No fevers, headache, change in vision, chest pain, shortness of breath, abdominal pain, nausea, vomiting, bowel or bladder incontinence, poor balance coordination problems. Remainder of 12 point review systems negative unless listed above.    Past Medical History:   Diagnosis Date     High cholesterol      Hypertension        Family History   Problem Relation Age of Onset     Hypothyroidism Mother      Leukemia Mother      Arthritis Mother      Hypertension Father      Aortic aneurysm Father         AAA     Colon polyps Father      Kidney cancer Maternal Grandfather          Social History     Socioeconomic History     Marital status:      Spouse name: None     Number of children: None     Years of education: None     Highest education level: None   Occupational History     None   Social Needs     Financial resource strain: None     Food insecurity:     Worry: None     Inability: None     Transportation needs:     Medical: None     Non-medical: None   Tobacco Use     Smoking status: Never Smoker     Smokeless tobacco: Never Used   Substance and Sexual Activity     Alcohol use: Yes     Comment: occ beer     Drug use: No     Sexual activity: None   Lifestyle     Physical activity:     Days per week: None     Minutes per session: None     Stress: None   Relationships     Social connections:     Talks on phone: None     Gets together: None     Attends Buddhist service: None     Active member of club or organization: None     Attends meetings of clubs or organizations: None     Relationship status: None     Intimate partner violence:     Fear of current or ex partner: None     Emotionally abused: None     Physically abused: None      Forced sexual activity: None   Other Topics Concern     None   Social History Narrative     None     Social history: .  2 children.  Works in maintenance.  No tobacco or alcohol.    The following portions of the patient's history were reviewed and updated as appropriate: allergies, current medications, past family history, past medical history, past social history, past surgical history and problem list.      WHO 5: 19    NDI Score: 10      Objective:   Physical Exam:    Vitals:    02/19/20 1449   BP: 135/65   Pulse: 77     Body mass index is 42.45 kg/m .      General:  Well-appearing male in no acute distress.  Overweight, pleasant, cooperative, and interactive throughout the examination and interview.  CV: 1+ lower extremity edema with compression stockings in the left lower extremity.  Lymphatics: No cervical lymphadenopathy palpated.  Eyes: sclera clear.  Skin: No rashes or lesions seen over the head/neck, hairline, arms, right leg, left leg compression stocking in place, trunk.  Respirations unlabored.  MSK: Gait is normal.     Negative Romberg.  Spine: normal AP curves of the C, T, and L spine.  Decreased cervical extension with reproduction of pain.  Mild decreased rotation bilaterally full flexion.  Palpation: Mild   tenderness to palpation over the left levator scapula and parascapular muscles. eXtremities: Full range of motion of the shoulders, elbows, and wrists with no effusions or tenderness to palpation.  Negative arm drop, empty can, and Speed's test bilaterally.  Negative George and Neer's test bilaterally.  Full range of motion of the  knees, and ankles from a seated position with no effusions or tenderness to palpation. No hypermobility of the upper or lower extremities.  Neurologic exam: Mental status: Patient is alert and oriented with normal affect.  Attention, knowledge, memory, and language are intact.  Normal coordination throughout the examination.  Reflexes are 2+ and symmetric  biceps, triceps, brachioradialis, patellar, and 0 Achilles with Negative Ora's.  Sensation is intact to light touch throughout the upper and lower extremities bilaterally.  Manual muscle testing reveals 5 out of 5 strength in the shoulder abductors, elbow flexors/extensors, wrist extensors, interosseous, and finger flexors; lower extremity strength appears grossly normal.   Normal muscle bulk and tone in the arms and legs.  Mild positive Spurling's to the right for left-sided parascapular pain.  Negative Spurling's to the left.

## 2021-06-07 NOTE — PROGRESS NOTES
[facility] Discharge Summary    Patient Name: Rui Lopez  Date: 4/14/2020  Referral Diagnosis: Acute pain of left shoulder  Referring provider: Dane Melgoza CNP      Patient was seen for 5 visits in PT without significant improvement and was following up with spine center after PT.  See most recent PT note for updated status.     Goals Below were not assessed d/t patient not returning for further PT:  Pt. will demonstrate/verbalize independence in self-management of condition in : 12 weeks  Pt will: improve left periscapular pain in 12 weeks  Pt will: improve neck extension without left periscapular pain in 12 weeks    Physical Therapy will be discharged at this time.    Thank you for your referral.  Hannah Wilkes, DPT, CLT  4/14/2020

## 2021-06-07 NOTE — PROGRESS NOTES
Assessment:   Rui Lopez is a 65 y.o. 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 We discussed the risks, benefits and alternatives to surgery including possible CSF leak with need for lumbar drain and possible need for a stage 2 procedure including laminectomy and instrumented fusion. Given that pt is not symptomatic from spinal cord compression, he has no neurologic deficit with his cervical radiculopathy, his surgery will be delayed at present. We will work to schedule his procedure as soon as we can.    Subjective:   Rui Lopez is a 65 y.o. male who submitted an eVisit request for evaluation of his Follow-up.    Feels similar to how he felt whicn we saw each other last time. Left arm involved only. Will get occasional pain into the thumb. Pain typically is 4-5/10, but can get up to an 8-9/10. Denies weakness. Same numbness in the shoulder- intermittent, nothing further down the arm or in the hand.      Objective:   No exam performed today, patient submitted as eVisit.    Telephone visit time: 22min    Ronda Manuel MD  04/16/20

## 2021-06-07 NOTE — PROGRESS NOTES
"NEUROSURGERY CONSULTATION NOTE    3/12/2020       CHIEF COMPLAINT: Left posterior shoulder pain    HPI:    Rui Lopez is a 65 y.o. male who is sent to us in consultation by Stephan Oscar DO for evaluation of cervical stenosis.    Onset: His symptoms began around 12/25/2019. Initially he believed that his symptoms began after \"sleeping wrong\".     Pain: He endorses left posterior shoulder pain that radiates to his lateral shoulder and occasionally \"shoots down\" to his left hand. Additionally, he endorses left lateral shoulder down to his left forearm tingling. He has not received steroid injections yet. He has been taking 100 mg of gabapentin two times a day. He went to physical therapy four times with very mild relief. He is on Xarelto due to previous pulmonary embolism a couple years ago. He denies any recent falls or stumbles. He denies weakness, right arm symptoms,bowel or bladder incontinence, imbalance, or  incoordination. No other complaints or concerns expressed at this time.    Aggravating factors: When his left arm is hanging down at the side his pain is provoked.  Relieving factors: Laying down and holding his left arm close to his body palliates his pain.     Pain management: He has been taking 100 mg of gabapentin two times a day with mild relief.     Past Medical History:   Diagnosis Date     DVT (deep vein thrombosis) in pregnancy      High cholesterol      History of pulmonary embolus (PE)      Hypertension      Leg swelling      Numbness and tingling      Sarcoidosis      Past Surgical History:   Procedure Laterality Date     CATARACT EXTRACTION Left 2006     CATARACT EXTRACTION Right 2004     CYSTECTOMY  1973    Testicular cystectomy     RETINAL DETACHMENT SURGERY Left 2005     RETINAL DETACHMENT SURGERY Right 2004         REVIEW OF SYSTEMS:  Pt denies weakness, right arm symptoms, bowel or bladder incontinence, imbalance, or  incoordination. Pt notes + left posterior shoulder pain that " "radiates to his lateral shoulder and occasionally \"shoots down\" to his left hand and left lateral shoulder down to his left forearm tingling. A full 14 point review of systems was otherwise completed and is negative aside from that mentioned above in the HPI    MEDICATIONS:    Current Outpatient Medications   Medication Sig Dispense Refill     acetaminophen-codeine (TYLENOL-CODEINE #3) 300-30 mg per tablet Take 1 tablet by mouth every 6 (six) hours as needed for pain. 22 tablet 0     chlorthalidone (HYGROTEN) 25 MG tablet Take 0.5 tablets (12.5 mg total) by mouth daily. 45 tablet 2     diclofenac sodium (VOLTAREN) 1 % Gel Apply 2 g to the shoulder 3 times daily 1 Tube 0     gabapentin (NEURONTIN) 100 MG capsule 1 cap at bedtime x 3 days, then may take 1 cap twice daily x 3 days, then may take 1 cap three times daily 90 capsule 2     latanoprost (XALATAN) 0.005 % ophthalmic solution USE ONE DROP IN OU D  4     rivaroxaban (XARELTO) 20 mg Tab Take 1 tablet (20 mg total) by mouth every evening. 90 tablet 3     spironolactone (ALDACTONE) 25 MG tablet Take 1 tablet (25 mg total) by mouth daily. 90 tablet 2     No current facility-administered medications for this visit.          ALLERGIES/SENSITIVITIES:     No Known Allergies    PERTINENT SOCIAL HISTORY:   Social History     Socioeconomic History     Marital status:      Spouse name: None     Number of children: None     Years of education: None     Highest education level: None   Occupational History     None   Social Needs     Financial resource strain: None     Food insecurity     Worry: None     Inability: None     Transportation needs     Medical: None     Non-medical: None   Tobacco Use     Smoking status: Never Smoker     Smokeless tobacco: Never Used   Substance and Sexual Activity     Alcohol use: Yes     Comment: occ beer     Drug use: No     Sexual activity: None   Lifestyle     Physical activity     Days per week: None     Minutes per session: None     " "Stress: None   Relationships     Social connections     Talks on phone: None     Gets together: None     Attends Confucianist service: None     Active member of club or organization: None     Attends meetings of clubs or organizations: None     Relationship status: None     Intimate partner violence     Fear of current or ex partner: None     Emotionally abused: None     Physically abused: None     Forced sexual activity: None   Other Topics Concern     None   Social History Narrative     None         FAMILY HISTORY:  Family History   Problem Relation Age of Onset     Hypothyroidism Mother      Leukemia Mother      Arthritis Mother      Hypertension Father      Aortic aneurysm Father         AAA     Colon polyps Father      Kidney cancer Maternal Grandfather         PHYSICAL EXAM:     Constitution: /80   Pulse 72   Resp 14   Ht 5' 6.5\" (1.689 m)   Wt (!) 267 lb (121.1 kg)   BMI 42.45 kg/m  .   Awake, alert and in NAD  Eyes: Conjugate gaze. Conjunctiva benign without icterus or injection  Heart: RRR  Lungs: Non-labored respiration without accessory muscle use  Skin: No obvious rash or lesion  Psych: Appropriate mood and affect, alert and oriented x 3  Mental Status:  Speech is fluent.  Recent and remote memory are intact.  Attention span and concentration are normal.     Cranial Nerves:  CN2: No funduscopic exam performed. CN3,4 & 6: Pupillary light response, lateral and vertical gaze normal.  No nystagmus. CN7: No facial weakness, smile, facial symmetry intact. CN8: Intact to spoken voice.      Motor: No pronator drift of upper extremity. Normal bulk and tone all muscle groups of upper and lower extremities. Strength is 5/5 in all muscle groups of the bilateral upper and lower extremities      Sensory: Sensation intact bilaterally to light touch and temperature throughout. Sensation is intact to pinprick in the bilateral LE. Vibratory sense is intact in the bl great toe.     Coordination:  Gait is WNL. Pt is " able to heel and toe walk without difficulty.    Tandem gait is WNL. RUTHANN in the UE is WNL     Reflexes; 2+ supinator, biceps, triceps in the left. 3 supinator, biceps, triceps in the right. 2+ patellar and achilles. No ojeda. No clonus. Toes are down-going bilaterally    Musculoskeletal: Negative straight leg raise bilaterally. Negative YANETH testing. Negative Ana finger test    IMAGING: I personally reviewed all radiographic images  Cervical spine MRI 2/14/2020: Patient has multilevel cervical spondylosis with congenitally small central canal.  He has general straightening of the cervical spine with general slight kyphotic curve.  At C3-4, there is a small paracentral osteophyte complex which abuts and mildly deforms the ventral cervical cord.  Patient has moderate to severe foraminal stenosis bilaterally.  At C4-5, there is a broad-based disc osteophyte complex contributing to moderate central canal stenosis, severe left foraminal stenosis.  Beginning at the upper level of the C5 vertebral body, there is a large calcified, likely ossified posterior longitudinal ligament fragment which is causing severe deformation of the cervical cord, severe central canal stenosis there appears to be severe left foraminal stenosis.  This large osteophyte complex extends beyond the C6-7 disc space to about the level of the inferior C7 body but continues within the right portion of the spinal canal.  Patient has moderate to severe foraminal stenosis on the left at C6-7.    Cervical spine x-ray 2/27/2020: General kyphotic curvature neutral alignment.  Patient has 13 degrees of cervical kyphosis approximately however visualizing the inferior aspect of the C7 vertebral body is limited by body habitus.    CT cervical spine 2/25/2020: There is proximal ossification of the posterior longitudinal ligament at the level of C3-4 which is causing some mild central canal stenosis he has severe left, moderate to severe right foraminal  stenosis.  C4-5, there is moderate foraminal stenosis on the left.  Beginning at the level of the C5 vertebral body extending into the right central canal there is a large osteophyte causing severe narrowing and inevitable compression of the cervical cord.  This extends down to the inferior aspect of the C6 vertebral body.  At the level of C6-7, there is additional osteophytic complex associated with the upper C7 vertebral body causing some mild to moderate central canal stenosis.  Patient has left severe foraminal stenosis at C5-6, C6-7.    CONSULTATION ASSESSMENT AND PLAN:    Rui Lopez is a 65 y.o. male who has evidence of posterior longitudinal ligament ossification with a large osteophyte complex within the right central canal causing severe spinal cord compression at C4-5 extending down to C6-7 who has signs and symptoms of left cervical radiculopathy    I reviewed Mr. Lopez's imaging with my partners and the consensus is that to best treat his cervical pathology, he would be best served by anterior cervical diskectomies and a two level corpectomy at C5 and C6. This will allow for decompression of his cervical cord where his compression is quite severe, as well as foraminal decompression which is contributing to his cervical radiculopathy (from which he is actually symptomatic). This is not a small surgery. I believe it would be best served in a two staged procedure where the second stage is posterior instrumented fusion with possible need for concurrent laminectomy.    I spent more than 60 minutes in this apt, examining the pt, reviewing the scans, reviewing notes from chart, discussing treatment options with risks and benefits and coordinating care. >50 % clinic time was spent in face to face counseling and coordinating care    Ronda Manuel MD      Cc:   Meng Barboza MD I, Yoel Durán, am serving as a scribe to document services personally performed by Dr. Ronda Manuel MD, based on my  observation and the provider's statements to me. I, Ronda Manuel MD attest that Yoel Durán is acting in a scribe capacity, has observed my performance of the services and has documented them in accordance with my direction.

## 2021-06-08 NOTE — TELEPHONE ENCOUNTER
RN cannot approve Refill Request    RN can NOT refill this medication med is not covered by policy/route to provider. Last office visit: 1/31/2019 Meng Barboza MD Last Physical: 8/27/2019 Last MTM visit: Visit date not found Last visit same specialty: Visit date not found.  Next visit within 3 mo: Visit date not found  Next physical within 3 mo: Visit date not found      Yanira Yusuf, [facility] Triage/Med Refill 5/16/2020    Requested Prescriptions   Pending Prescriptions Disp Refills     XARELTO 20 mg tablet [Pharmacy Med Name: XARELTO 20MG TABLETS] 90 tablet 3     Sig: TAKE 1 TABLET BY MOUTH EVERY EVENING.       There is no refill protocol information for this order

## 2021-06-09 NOTE — TELEPHONE ENCOUNTER
Refill Approved    Rx renewed per Medication Renewal Policy. Medication was last renewed on 8/27/19.    Karina Underwood, [facility] Triage/Med Refill 7/9/2020     Requested Prescriptions   Pending Prescriptions Disp Refills     spironolactone (ALDACTONE) 25 MG tablet [Pharmacy Med Name: SPIRONOLACTONE 25MG TABLETS] 90 tablet 2     Sig: TAKE 1 TABLET(25 MG) BY MOUTH DAILY       Diuretics/Combination Diuretics Refill Protocol  Passed - 7/6/2020  8:40 AM        Passed - Visit with PCP or prescribing provider visit in past 12 months     Last office visit with prescriber/PCP: 1/31/2019 Meng Barboza MD OR same dept: 2/10/2020 Dane Melgoza CNP OR same specialty: 2/10/2020 Dane Melgoza CNP  Last physical: 8/27/2019 Last MTM visit: Visit date not found   Next visit within 3 mo: Visit date not found  Next physical within 3 mo: Visit date not found  Prescriber OR PCP: Meng Barboza MD  Last diagnosis associated with med order: 1. Essential hypertension  - spironolactone (ALDACTONE) 25 MG tablet [Pharmacy Med Name: SPIRONOLACTONE 25MG TABLETS]; TAKE 1 TABLET(25 MG) BY MOUTH DAILY  Dispense: 90 tablet; Refill: 2    If protocol passes may refill for 12 months if within 3 months of last provider visit (or a total of 15 months).             Passed - Serum Potassium in past 12 months      Lab Results   Component Value Date    Potassium 3.8 08/27/2019             Passed - Serum Sodium in past 12 months      Lab Results   Component Value Date    Sodium 138 08/27/2019             Passed - Blood pressure on file in past 12 months     BP Readings from Last 1 Encounters:   03/12/20 130/80             Passed - Serum Creatinine in past 12 months      Creatinine   Date Value Ref Range Status   08/27/2019 1.37 (H) 0.70 - 1.30 mg/dL Final

## 2021-06-11 NOTE — PATIENT INSTRUCTIONS - HE
Repeat MRI and consult with neurosurgery about plans for decompressive surgery for your neck stenosis.  If you have significant worsening burning pain or arm weakness develop that does not go away with change in position, seek medical attention right away.    When you get your surgery date for your neck, schedule a preop appointment 1 to 2 weeks before the surgery.  You will need to hold the Xarelto for 4 days prior to the surgery.  If you are able to restart Xarelto within 3 days after surgery, I would not have you put in a temporary IVC filter for the surgery.    Your colonoscopy will be due April 2021.    Follow-up in 6 months for routine blood pressure and general checkup with me.    Get a flu shot this fall.

## 2021-06-11 NOTE — PROGRESS NOTES
[facility] Follow Up Note    Rui Lopez   65 y.o. male    Date of Visit: 9/2/2020    Chief Complaint   Patient presents with     Annual Exam     Subjective  Patient is a 65-year-old male here for health maintenance physical exam.    He also wanted to discuss his new cervical spinal stenosis condition.    Patient was having increasing neck pain in February.  On MRI February 2020 reviewed by me today showed a significant disc herniation causing severe spinal stenosis at C5-6.  Osteophyte complex.  He is still having significant left see 5-6 shoulder and upper arm radicular burning pain with certain positions such as sitting in certain chairs.  If he changes position he can alleviate the pain.    He still able to lie flat at night in bed and sleep okay without significant pain.    He is not had progressive numbness or weakness of the arm.    He is not taking pain medication.    I did review the neurosurgery note from February and then April, but they put the surgery on hold with the COVID-19 outbreak.  They had planned a staged decompressive surgery at that time.    Patient has not had any worsening symptoms, in fact improved symptoms since, but still continued symptoms.    No lower extremity radicular or weakness symptoms.    Patient does have significant obesity with severe sleep apnea diagnosed on May 2019 study.  He is now highly compliant with CPAP, 100% insignificant benefit.  He denies significant daytime sleepiness.    No palpitations or history of atrial fibrillation.  No increasing shortness of breath or heart failure symptoms.    He does have some borderline renal insufficiency with a creatinine of 1.2-1.4.    He is on chlorthalidone 12.5 mg a day and spironolactone 25 mg a day.    His blood pressures been quite well controlled usually 110-120/70s.  He denies orthostasis.    Past history of recurrent pulmonary embolism/DVT.  Chronic lower extremity edema related to this.  That stable.   Wears compression stockings.  His last PE was a saddle embolus in 2018.  He is now on long-term Xarelto.    No bleeding issues.  No falls.    No chest pain or chest pressure.  No family history of heart disease.    Mother had a stroke.    No family history of diabetes and his blood sugars have been normal.    August 2019 labs reviewed with an LDL of 97 and HDL 44.  He is not wanted to use a statin.  He did have triglycerides of 271.    Weight is down 7 pounds.  Try to work on diet.  Walks some.    Glaucoma controlled.  Has an appointment next month with ophthalmology.  No vision changes or headaches.    No new cough or fever.    He is never smoked.    Colonoscopy April 2016 had one polyp.  His father had history of polyps but patient denies that it was a history of colon cancer.  5-year follow-up plan will be due next April.  Bowels are regular no blood in stool currently.  No abdominal pain complaints.    Umbilical hernia without symptoms.    No urinary symptoms.  PSA was normal at 0.2 last year.    He has not noticed any change in the mole on his right cheek, he states that been stable over 5 years.  No history of skin cancer.        PMHx:    Past Medical History:   Diagnosis Date     DVT (deep vein thrombosis) in pregnancy      High cholesterol      History of pulmonary embolus (PE)      Hypertension      Leg swelling      Numbness and tingling      Sarcoidosis      PSHx:    Past Surgical History:   Procedure Laterality Date     CATARACT EXTRACTION Left 2006     CATARACT EXTRACTION Right 2004     CYSTECTOMY  1973    Testicular cystectomy     RETINAL DETACHMENT SURGERY Left 2005     RETINAL DETACHMENT SURGERY Right 2004     Immunizations:   Immunization History   Administered Date(s) Administered     Tdap 08/13/2013       ROS A comprehensive review of systems was performed and was otherwise negative    Medications, allergies, and problem list were reviewed and updated    Exam  /70 (Patient Site: Right Arm,  "Patient Position: Sitting, Cuff Size: Adult Large)   Pulse 84   Temp 98  F (36.7  C) (Other) Comment (Src): forehead  Ht 5' 9.75\" (1.772 m)   Wt (!) 262 lb (118.8 kg)   BMI 37.86 kg/m    Alert and oriented x3.  Significant obesity.  Able to climb up on exam table without difficulty.  Gait otherwise normal.  Pupils irises equal and reactive.  Extraocular muscles intact.  No jaundice or conjunctivitis.  External ears and nose exam is normal tympanic membrane's are normal.  He did not have any upper extremity weakness.  No muscle wasting on arm or shoulder.  He was able to lie down and sit up and move without evidence of significant cervical pain.  No cervical or supraclavicular adenopathy.  No thyromegaly or nodularity.  No JVD and no carotid bruits.  Lungs are clear to auscultation with good respiratory excursion.  Heart is regular with no murmur rub or gallop.  Abdomen is obese but nontender.  Reducible umbilical hernia.  No hepatosplenomegaly or abdominal pulsatile mass.  +1 posterior tibialis pulses bilaterally.  Trace to +1 ankle edema bilaterally similar to previous.  Feet in good condition.  He declined  exam.  Skin exam without suspicious lesions    Solar lentigo on his right cheek, unchanged.  Well-circumscribed and approximately 1 cm, flat.  Patient denies any change.    Assessment/Plan  1. Healthcare maintenance  Main issue is obesity with sleep apnea and hypertension.  He now has that under good control.    Continue to work on weight loss and regular exercise.    He wants to get a flu shot later this fall at local drugstore.  Tetanus shot was in 2013.    2. Spinal stenosis in cervical region  Continued intermittent left upper shoulder and arm radicular burning pain with certain positions.  Pain is improved somewhat since April but not resolved.    No progressive numbness or weakness    Given severity of his spinal stenosis previously, I suspect he will still need to have decompressive " surgery.    Repeat MRI and refer back to neurosurgery for treatment plan.  - MR Cervical Spine Without Contrast; Future  - Ambulatory referral to Neurosurgery    3. History of pulmonary embolism  Long-term Xarelto.    Plan for anticoagulation with surgery would be 4 days off Xarelto prior to the back surgery, and that hold Xarelto for 3 days after neck surgery.  I did discuss risk of recurrent DVT and pulmonary embolism.    As long as he is able to get back on the Xarelto within 3 days after the neck surgery, I will not recommend a temporary IVC filter, given the risk of IVC filter placement and removal.  Patient accepts risk of recurrent DVT/pulmonary embolism with holding anticoagulation for surgery.    I did review risk of bleeding with Xarelto.  Patient again was told if he falls and hits his head he should go directly to the emergency room for evaluation.     4. History of colonic polyps  5-year colonoscopy will be due April 2021.    5. Essential hypertension  Controlled.  Borderline low but denies orthostasis.  Patient does not wish to consider adjusting his medication.    If worsening creatinine, could consider reduction of spironolactone down to 12.5 mg a day.    Well-controlled cholesterol without need for statin.  Elevated triglycerides, work on diet and weight loss.  - Lipid Cascade    6. Sleep apnea, unspecified type  Severe sleep apnea.  Highly compliant with CPAP and benefiting significantly.  100% compliance.  Continue to work on weight loss.    7. Screening for prostate cancer  No urinary symptoms  - PSA (Prostatic-Specific Antigen), Annual Screen    8. Medication monitoring encounter    - Comprehensive Metabolic Panel  - HM2(CBC w/o Differential)    Glaucoma controlled and has an appointment next month with ophthalmology.  On eyedrops.    Return in about 6 months (around 3/2/2021) for Recheck.   Patient Instructions   Repeat MRI and consult with neurosurgery about plans for decompressive surgery for  your neck stenosis.  If you have significant worsening burning pain or arm weakness develop that does not go away with change in position, seek medical attention right away.    When you get your surgery date for your neck, schedule a preop appointment 1 to 2 weeks before the surgery.  You will need to hold the Xarelto for 4 days prior to the surgery.  If you are able to restart Xarelto within 3 days after surgery, I would not have you put in a temporary IVC filter for the surgery.    Your colonoscopy will be due April 2021.    Follow-up in 6 months for routine blood pressure and general checkup with me.    Get a flu shot this fall.    Meng Barboza MD        Current Outpatient Medications   Medication Sig Dispense Refill     chlorthalidone (HYGROTEN) 25 MG tablet Take 0.5 tablets (12.5 mg total) by mouth daily. 45 tablet 2     latanoprost (XALATAN) 0.005 % ophthalmic solution USE ONE DROP IN OU D  4     spironolactone (ALDACTONE) 25 MG tablet TAKE 1 TABLET(25 MG) BY MOUTH DAILY 90 tablet 0     XARELTO 20 mg tablet TAKE 1 TABLET BY MOUTH EVERY EVENING. 90 tablet 3     gabapentin (NEURONTIN) 100 MG capsule 1 cap at bedtime x 3 days, then may take 1 cap twice daily x 3 days, then may take 1 cap three times daily 90 capsule 2     No current facility-administered medications for this visit.      No Known Allergies  Social History     Tobacco Use     Smoking status: Never Smoker     Smokeless tobacco: Never Used   Substance Use Topics     Alcohol use: Yes     Comment: occ beer     Drug use: No

## 2021-06-12 NOTE — PROGRESS NOTES
Neurosurgery Progress Note  10/13/20    A/P: Rui Lopez is a 65 y.o. male with a PMH sig for saddle pulmonary embolism on xarelto who has multilevel cervical spondylosis and degenerative cervical disc disease with kyphosis and posterior longitudinal ligament ossification with a large osteophyte complex within the right central canal at the levels of C5 and C6 causing severe spinal cord compression who previously had signs and symptoms of left cervical radiculopathy which is now largely resolved.     We had previously discussed the option of a two staged surgery which would include a C5 and C6 corpectomy with anterior instrumentation followed by a  stage 2 procedure including laminectomy and instrumented fusion. Given that pt is not symptomatic from spinal cord compression and his cervical radiculopathy is almost completely resolved, I do not believe that performing this surgery electively on someone who is essentially asymptomatic (no myelopathy with minimal, intermittent and improving radicular symptoms) is within Rui's best interest. He is going to monitor his symptoms and will let us know if he develops worsening symptoms of radiculopathy or symptoms of myelopathy which we reviewed in detail.    S:  Finds that he is still getting intermittent pain into the left thumb - occurs 2-3x per day but leaves quickly. Denies any numbness, tingling or weakness. Denies RUE symptoms. Notes that back in September when he saw his PCP, he was having improving symptoms but still with persistent shoulder pain so a repeat MRI was ordered.    PMH: No interval change  PSH: No interval change    ROS: Denies any changes in bowel or bladder. Denies issues with imbalance or incoordination. 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 A full 14 point review of systems was otherwise completed and is negative aside from that mentioned above in the  HPI    O:  Vitals:    10/13/20 0800   BP: 130/61   Pulse: 76   SpO2: 96%     General: Awake, alert, NAD  HEENT: AT/NC, EOMI, face symmetric, oral mucosa moist and pink  Heart: RRR  Lungs: Nonlabored respirations without accessory muscle use.  Neuro: Awake, alert, speech is clear and content is appropriate. MAEW x 4 with full strength in b/l UE and LE. Sensation is intact to temperature and LT. Vibratory sense is intact in the bl great toe  Coordination: RUTHANN WNL in the bl UE. No difficulty with get up and go. Pt walks with a slight forward flexed posture. Pt is able to heel and toe walk without difficulty. He is able to tandem gait without issue and he reverse tandem gaits without difficulty.  Reflexes: 3+ deep tendon reflexes in the bilateral upper extremities.  2+ DTR in the bl LE, but achilles are difficult to elicit. No clonus. Toes downgoing bilaterally.  Musculoskeletal: Negative straight leg raise bl  Psych: Appropriate mood and affect, pleasant, cooperative, alert and oriented x 3  Skin: No obvious rash or lesion    I personally reviewed and visualized the following radiographic images:  MRI cervical spine 9/30/2020: Patient has multilevel cervical spondylosis with congenitally small central canal and multilevel degenerative disc changes noted from C3-4 to C6-7.  He has general straightening of the cervical spine with general slight kyphotic curve.  At C3-4, there is a small paracentral osteophyte complex which flattens the thecal sac and causes moderate central canal stenosis but without significant cord deformation.  Patient has moderate to severe foraminal stenosis bilaterally.  At C4-5, there is a broad-based disc osteophyte complex contributing to moderate central canal stenosis, severe left foraminal stenosis.  Beginning at the upper level of the C5 vertebral body, there is a large calcified, likely ossified posterior longitudinal ligament fragment. This is eccentric to the right side of the cervical  canal and is causing severe right sided. At C4-5, there appears to be severe left foraminal stenosis.  This large osteophyte complex extends to the C6-7 disc space level.  Patient has moderate to severe foraminal stenosis on the left at C6-7.    I spent greater than 30 minutes in this encounter for which >50% of the time was spent in face to face discussion obtaining the relevant history, obtaining the exam, reviewing relevant images and discussing options for treatment/management.    Ronda Manuel MD

## 2021-06-12 NOTE — TELEPHONE ENCOUNTER
Refill Approved    Rx renewed per Medication Renewal Policy. Medication was last renewed on 7/9/20.    Karina Underwood, [facility] Triage/Med Refill 10/7/2020     Requested Prescriptions   Pending Prescriptions Disp Refills     spironolactone (ALDACTONE) 25 MG tablet 90 tablet 0     Sig: Take 1 tablet (25 mg total) by mouth daily.       Diuretics/Combination Diuretics Refill Protocol  Passed - 10/7/2020 10:01 AM        Passed - Visit with PCP or prescribing provider visit in past 12 months     Last office visit with prescriber/PCP: 1/31/2019 Meng Barboza MD OR same dept: 2/10/2020 Dane Melgoza CNP OR same specialty: 2/10/2020 Dane Melgoza CNP  Last physical: 9/2/2020 Last MTM visit: Visit date not found   Next visit within 3 mo: Visit date not found  Next physical within 3 mo: Visit date not found  Prescriber OR PCP: Meng Barboza MD  Last diagnosis associated with med order: 1. Essential hypertension  - spironolactone (ALDACTONE) 25 MG tablet; Take 1 tablet (25 mg total) by mouth daily.  Dispense: 90 tablet; Refill: 0    If protocol passes may refill for 12 months if within 3 months of last provider visit (or a total of 15 months).             Passed - Serum Potassium in past 12 months      Lab Results   Component Value Date    Potassium 4.0 09/02/2020             Passed - Serum Sodium in past 12 months      Lab Results   Component Value Date    Sodium 140 09/02/2020             Passed - Blood pressure on file in past 12 months     BP Readings from Last 1 Encounters:   09/02/20 112/70             Passed - Serum Creatinine in past 12 months      Creatinine   Date Value Ref Range Status   09/02/2020 1.35 (H) 0.70 - 1.30 mg/dL Final

## 2021-06-12 NOTE — PROGRESS NOTES
Rui is here to f/u on new Cervical MRI. He last saw Dr. Manuel in April and had discussed a Cervical C5 and C6 Corpectomy, but due to pandemic wanted to hold off. Pt's primary wanted pt to have follow up on new imaging and symptoms. Today pt states he no longer has neck pain, left shoulder pain/ burning sensation is also gone. Pt states he has not changed his activity and is still working but he feels great. NDI not completed as pt has no pain.   Mannie,CMA

## 2021-06-12 NOTE — PROGRESS NOTES
Assessment:   1. Routine general medical examination at a health care facility  Appears to be up-to-date.  I did not mention the shingles shot but we should suggest that next visit    2. Essential hypertension  Well-controlled on 3 meds.  Will check labs today and follow-up in 6 months.  - HM2(CBC w/o Differential)  - Comprehensive Metabolic Panel  - Urinalysis    3. Sarcoidosis of lung with sarcoidosis of lymph nodes  He has not had a chest x-ray in a couple years we will go ahead and repeat that now.  No symptoms at this time.  - XR Chest PA and Lateral    4. Special screening for malignant neoplasm of prostate    - PSA (Prostatic-Specific Antigen), Annual Screen    5. Screening cholesterol level  His lipids have been elevated in the past but then better.  His mom had some sort of recent CVA and he was concerned about atherosclerosis.  Will check lipids and then I did suggest that he get a CT heart scan to evaluate risk and decide if he needs a statin.  Good appropriate diet and exercise would be fantastic as well of course  - Lipid Cascade    6. Adenomatous colon polyp (04/2016)  He should now be on a five-year plan because of this polyp    Insomnia- trouble waking up at 3:00.  I gave him sleep recommendations from Jose hernandez book         Plan:     Patient Instructions   Eat less!    CT Heart Scan for calcium score, [facility]- #[phone]          Subjective:     Here for physical exam. Chart reviewed including lab work from last year that was normal,.  He has some concerns about difficulty in awakening at about 3:00.  It has become a nightly routine.  He is avoiding caffeine in the morning more than 2 cups a day and has been able to get through his workday without problems.  He feels a little fatigued by the time he gets home.  No dramatic concerns.  He has no trouble falling asleep.      Past Medical History:   Diagnosis Date     High cholesterol      Hypertension      Past Surgical History:  "  Procedure Laterality Date     CATARACT EXTRACTION Left 2006     CATARACT EXTRACTION Right 2004     CYSTECTOMY  1973    Testicular cystectomy     RETINAL DETACHMENT SURGERY Left 2005     RETINAL DETACHMENT SURGERY Right 2004     Review of patient's allergies indicates no known allergies.  Family History   Problem Relation Age of Onset     Hypothyroidism Mother      Leukemia Mother      Arthritis Mother      Hypertension Father      Aortic aneurysm Father      AAA     Colon polyps Father      Kidney cancer Maternal Grandfather      Social History     Social History     Marital status:      Spouse name: N/A     Number of children: N/A     Years of education: N/A     Occupational History     Not on file.     Social History Main Topics     Smoking status: Never Smoker     Smokeless tobacco: Not on file     Alcohol use Yes      Comment: occ beer     Drug use: No     Sexual activity: Not on file     Other Topics Concern     Not on file     Social History Narrative         Review of Systems  Please see form B           Objective:     Vitals:    07/27/17 1515   BP: 128/62   Pulse: 70   Resp: 18   Weight: (!) 245 lb (111.1 kg)   Height: 5' 9\" (1.753 m)       Physical  General Appearance: Alert, cooperative, no distress, appears stated age  Head: Normocephalic, without obvious abnormality, atraumatic  Eyes: PERRL, fundi not observed, EOM's intact  Ears: Normal TM's and external ear canals bilateral  Nose: No abnormalities noted  Mouth and Throat: Normal appearance   Neck: Supple, symmetrical, trachea midline, no adenopathy or thyromegally,  no tenderness/mass/nodules; no carotid bruit or JVD  Back: no CVA tenderness or spinous process pain  Lungs: Clear to auscultation bilaterally, good air movent  Heart: Regular rate and rhythm, S1 and S2 normal, no murmur, rub, or gallop,  Abdomen: Soft, non-tender, no masses, no organomegaly.  umbilical hernia.  Genitourinary:.  No hernias  in the groin but there is an umbilical " hernia nontender  Rectal exam: Normal external and internal prostate is relatively small smooth without nodules  Musculoskeletal: No gross abnormalities    Extremities: Extremities normal, atraumatic, no cyanosis or edema, pulses 2/4 and symmetric varicose veins bilateral trace edema  Skin:  no  worrisome lesions noted  Lymph nodes: Cervical, supraclavicular, groin, and axillary nodes normal  Neurologic: CN2-12 intact, normal sensation, DTRs 2/4 and symmetric.   Psychiatric:  normal mood and affect.      Current Outpatient Prescriptions   Medication Sig Dispense Refill     chlorthalidone (HYGROTEN) 25 MG tablet TAKE 1/2 TABLET(12.5 MG) BY MOUTH DAILY 45 tablet 1     cholecalciferol, vitamin D3, 1,000 unit tablet Take 1,000 Units by mouth daily.       losartan (COZAAR) 100 MG tablet Take 0.5 tablets (50 mg total) by mouth daily. 45 tablet 3     spironolactone (ALDACTONE) 25 MG tablet Take 1 tablet (25 mg total) by mouth daily. 90 tablet 3     No current facility-administered medications for this visit.

## 2021-06-13 NOTE — TELEPHONE ENCOUNTER
Refill Approved    Rx renewed per Medication Renewal Policy. Medication was last renewed on 8/27/19.    Karina Underwood, [facility] Triage/Med Refill 11/10/2020     Requested Prescriptions   Pending Prescriptions Disp Refills     chlorthalidone (HYGROTEN) 25 MG tablet 45 tablet 2     Sig: Take 0.5 tablets (12.5 mg total) by mouth daily.       Diuretics/Combination Diuretics Refill Protocol  Passed - 11/9/2020 12:17 PM        Passed - Visit with PCP or prescribing provider visit in past 12 months     Last office visit with prescriber/PCP: 1/31/2019 Meng Barboza MD OR same dept: 2/10/2020 Dane Melgoza CNP OR same specialty: 2/10/2020 Dane Melgoza CNP  Last physical: 9/2/2020 Last MTM visit: Visit date not found   Next visit within 3 mo: Visit date not found  Next physical within 3 mo: Visit date not found  Prescriber OR PCP: Meng Barboza MD  Last diagnosis associated with med order: 1. Essential hypertension  - chlorthalidone (HYGROTEN) 25 MG tablet; Take 0.5 tablets (12.5 mg total) by mouth daily.  Dispense: 45 tablet; Refill: 2    If protocol passes may refill for 12 months if within 3 months of last provider visit (or a total of 15 months).             Passed - Serum Potassium in past 12 months      Lab Results   Component Value Date    Potassium 4.0 09/02/2020             Passed - Serum Sodium in past 12 months      Lab Results   Component Value Date    Sodium 140 09/02/2020             Passed - Blood pressure on file in past 12 months     BP Readings from Last 1 Encounters:   10/13/20 130/61             Passed - Serum Creatinine in past 12 months      Creatinine   Date Value Ref Range Status   09/02/2020 1.35 (H) 0.70 - 1.30 mg/dL Final

## 2021-06-14 NOTE — PROGRESS NOTES
Clinic Note    Assessment:     Assessment and Plan:    1. Thrombophlebitis of superficial veins of left lower extremity  I quickly consulted with Dr. Stafford about the patient; we will forgo an US today and have the patient adhere to recommendations outlined below. He was told that he should follow-up with me if his symptoms worsen.        Patient Instructions   Use 325 mg aspirin every 12 hours for the next two weeks.     Apply heat with a heating pad every four hours for 20 minutes.     Keep the leg elevated when you can.             Subjective:      Rui Lopez is a 62 y.o. male who comes to the clinic with a sore spot on his left ankle.     Says that he first noticed some tenderness in the area four days ago; since then the swelling and pain has worsened. Now the area feels warm to the touch. Pts wife is worried about blood clot or cellulitis. Patient has been using heat which helps. No new cough. No chest pain.     The following portions of the patient's history were reviewed and updated as appropriate: Allergies, Medications, Problem List.     Review of Systems:    Review is negative except for what is mentioned above.     Social Hx:    History   Smoking Status     Never Smoker   Smokeless Tobacco     Not on file         Objective:     Vitals:    12/05/17 1428   BP: 116/76   Pulse: 75   Temp: 97.6  F (36.4  C)   SpO2: 93%   Weight: (!) 255 lb (115.7 kg)       Exam:    General: No apparent distress. Calm. Alert and Oriented X3. Pt behavior is appropriate.  Skin: Area of redness and swelling superior to patient's left ankle. No calf tenderness, homans signs negative. Small area of circumferential induration measuring about 5 cm in diameter. Feels slightly warm to touch.       Patient Active Problem List   Diagnosis     Hypertension     Hematuria     Sarcoidosis of lung with sarcoidosis of lymph nodes     Adenomatous colon polyp (04/2016)     Current Outpatient Prescriptions   Medication Sig Dispense Refill      chlorthalidone (HYGROTEN) 25 MG tablet TAKE 1/2 TABLET(12.5 MG) BY MOUTH DAILY 45 tablet 3     cholecalciferol, vitamin D3, 1,000 unit tablet Take 1,000 Units by mouth daily.       losartan (COZAAR) 100 MG tablet TAKE 1/2 TABLET(50 MG) BY MOUTH DAILY 45 tablet 0     spironolactone (ALDACTONE) 25 MG tablet Take 1 tablet (25 mg total) by mouth daily. 90 tablet 3     No current facility-administered medications for this visit.        Total time spent with patient was 15 minutes with >50% of time spent in face-to-face counseling regarding the above plan       Dane Melgoza (Rob), [place]    12/5/2017

## 2021-06-14 NOTE — PROGRESS NOTES
Clinic Note    Assessment:     Assessment and Plan:    1. Cough    We will start with a chest x-ray today to rule out a pneumonia. I quickly consulted with Dr. Stafford who suggested the care plan below (see instructions).     - XR Chest 2 Views; Future        Patient Instructions   We will call you if the results of the chest x ray are abnormal or indicate a pneumonia.     Otherwise, use nyquil to help you sleep at nightt; use dayquil for symptoms during the day.    If you are still having symptoms in one week, call to follow up with Dr. Stafford.              Subjective:      Rui Lopez is a 62 y.o. male who comes to the clinic with a cough. Symptoms started on Saturday. Says that when he coughs he has some pain in the right side of his chest; worse yesterday seems better today. Wife is a nurse and listened to his chest and heard crackles on the lower right side of his chest. Says that he does not feel ill; but notices that he feels fatigued with exertion. Says that he had a low grade fever last night of 99.6F. Denies nasal congestion, says that he does not have sinus pressure. Cough is dry and non-productive. Says that he had pneumonia years ago; reports that he has a hx of sarcoidosis in the past. Pt does not smoke. 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     The following portions of the patient's history were reviewed and updated as appropriate: Allergies, Medications, Problem List.     Review of Systems:    Review is negative except for what is mentioned above.     Social Hx:    History   Smoking Status     Never Smoker   Smokeless Tobacco     Not on file         Objective:     Vitals:    11/28/17 0946   BP: 114/60   Pulse: 92   Temp: 98.3  F (36.8  C)   SpO2: 98%   Weight: (!) 255 lb (115.7 kg)       Exam:    General: No apparent distress. Calm. Alert and Oriented X3. Pt behavior is appropriate.  Head:Atraumatic. Normocephalic, non-tender to  palpation  Neck: Supple. No JVD. Full ROM. No adenopathy  Eyes: PERRL, No discharge. No strabismus. No nystagmus.  Ears: TMs pearly gray with landmarks visible.   Nose/Mouth/Throat: Patent nares, no oral lesions, pharynx clear and without exudate. Uvula mid-line. Nasal septum mid-line. Clear turbinates.   Lymph: No axillar or cervical adenopathy.   Chest/Lungs: Normal chest wall, ?faint crackles in right lower lobe, normal respiratory effort and rate.   Heart/Pulses: Regular rate and rhythm, strong and equal radial pulses, no murmurs. Capillary refill <2 seconds. No edema.   Musculoskeletal: No CVA tenderness with palpation. Good ROM with extremities.   Neurologic: Interactive, alert, no focal findings, CNs intact.   Skin: Warm, dry. Normal hair pattern. Free of lesions. Normal skin turgor.       Patient Active Problem List   Diagnosis     Hypertension     Hematuria     Sarcoidosis of lung with sarcoidosis of lymph nodes     Adenomatous colon polyp (04/2016)     Current Outpatient Prescriptions   Medication Sig Dispense Refill     chlorthalidone (HYGROTEN) 25 MG tablet TAKE 1/2 TABLET(12.5 MG) BY MOUTH DAILY 45 tablet 3     cholecalciferol, vitamin D3, 1,000 unit tablet Take 1,000 Units by mouth daily.       losartan (COZAAR) 100 MG tablet TAKE 1/2 TABLET(50 MG) BY MOUTH DAILY 45 tablet 0     spironolactone (ALDACTONE) 25 MG tablet Take 1 tablet (25 mg total) by mouth daily. 90 tablet 3     No current facility-administered medications for this visit.        Total time spent with patient was 15 minutes with >50% of time spent in face-to-face counseling regarding the above plan       Dane Melgoza (Rob), [place]    11/28/2017

## 2021-06-15 NOTE — PROGRESS NOTES
[facility] Follow Up Note    Rui Lopez   66 y.o. male    Date of Visit: 3/2/2021    Chief Complaint   Patient presents with     Follow-up     Subjective  Rui is here for follow-up of multiple medical issues.    He has severe central canal cervical stenosis and was planning to have a laminectomy with fusion at C5-6 a year ago, but with COVID-19 outbreak the surgery was canceled, and he got better without surgical intervention.    Patient did repeat his MRI September 2020 which I did review with patient today.  There is still moderate to severe central canal stenosis at C5-6.  Not significant change from previous.    I did review the neurosurgeon note from October 2020, as he was not having recurrent neck pain or radicular symptoms, surgery was still put on hold.    Patient is ambulating on a regular basis.  No leg radicular symptoms or weakness.    Patient has lost weight, 12 pounds.    He does have severe sleep apnea diagnosed in 2019.  He is 100% compliant with CPAP.    No palpitations or history of atrial fibrillation.    Hypertension has been well controlled.  He denies orthostasis.  His blood pressure has come down some with weight loss.    He has mild renal insufficiency with a creatinine of 1.3 last September.    Blood pressure was 112/70 last September.    He is on chlorthalidone 12.5 mg a day and spironolactone 25 mg a day.    He has a past history of DVT and pulmonary embolism in 2018.  Recurrent.  He is on chronic Xarelto.  No bleeding issues.    Chronic lower extremity edema on left leg, without increase.    No family history of diabetes.    September 2020  and HDL 43, no plans for statin.    He is never smoked.    History of 2 polyps April 2016.  5-year colonoscopy is due in April but he is planning to delay that somewhat with the Covid outbreak.  Bowels are regular without blood in stool.    He is ophthalmology last fall, glaucoma stable and has an appointment coming up  soon.    He had some decreased mood earlier this winter but he states that has passed.  He denies any depression or significant anxiety.  He does not wish to have a mental health referral.    Patient had probable gout of his right index finger on February 10.  He had a brief spell about a month before that as well that resolved on its own.  He was given 5 days of prednisone in February and the gout resolved in a day.  He has some mild underlying osteoarthritis of the hands.  He cannot identify any dietary change that precipitated the gout.  He has not had frequent episodes of gout prior to this.  No arthritis pain now.    He has a right cheek mole 1 cm this been stable over 5 years and he reports that is again stable.    PMHx:    Past Medical History:   Diagnosis Date     DVT (deep vein thrombosis) in pregnancy      High cholesterol      History of pulmonary embolus (PE)      Hypertension      Leg swelling      Numbness and tingling      Sarcoidosis      PSHx:    Past Surgical History:   Procedure Laterality Date     CATARACT EXTRACTION Left 2006     CATARACT EXTRACTION Right 2004     CYSTECTOMY  1973    Testicular cystectomy     RETINAL DETACHMENT SURGERY Left 2005     RETINAL DETACHMENT SURGERY Right 2004     Immunizations:   Immunization History   Administered Date(s) Administered     Tdap 08/13/2013       ROS A comprehensive review of systems was performed and was otherwise negative    Medications, allergies, and problem list were reviewed and updated    Exam  /70 (Patient Site: Right Arm, Patient Position: Sitting, Cuff Size: Adult Large)   Pulse 72   Temp 97.2  F (36.2  C) (Other) Comment (Src): forehead  Wt (!) 250 lb (113.4 kg)   BMI 36.13 kg/m    Patient had mask on.  Lungs are clear.  Heart is regular without murmur.  Trace ankle edema on the left ankle.  No significant edema on right.  Abdomen obese, nontender.  Mild osteoarthritic changes of the hands but no active inflammation or joint  effusion on the right index finger.    Assessment/Plan  1. Hypertension  Controlled.  Borderline lower blood pressures.  Patient was told to watch for lower blood pressures now that he is lost weight.  Continue on current chlorthalidone and spironolactone at this time.    Mild renal insufficiency and recheck labs today    2. JOSTIN (obstructive sleep apnea)  Highly compliant with CPAP.  Working on losing weight    3. Cervical stenosis of spinal canal  No recurrent symptoms.  He does have moderate to severe central canal stenosis at C5-6.  If he was to have recurrent neck pain or radicular symptoms, I would refer him back to neurosurgery and reevaluate for decompressive surgery with fusion.    4. History of pulmonary embolism  Chronic Xarelto.    5. History of colonic polyps  See patient instructions regarding holding Xarelto for upcoming colonoscopy.  I did discuss potential risk of recurrent DVT and pulmonary embolism with being off Xarelto temporarily.  He accepts this risk.    6. Encounter for therapeutic drug monitoring    - Comprehensive Metabolic Panel    7. History of gout  Probable gout of finger.  If significantly high uric acid, may need to reevaluate chlorthalidone treatment plan.    Improve diet and avoid processed meats.  Patient cannot use NSAIDs with his mild renal insufficiency on Xarelto.    Prednisone for 5 days worked well for him previously.  - Uric Acid    He was told to monitor his [employer] portal for information of the COVID-19 vaccine.    Glaucoma controlled and he has an appointment coming up soon for ophthalmology follow-up.    Return in about 6 months (around 9/2/2021) for Annual physical.   Patient Instructions   If blood pressure is running less than 110/50 or you feel lightheaded dizzy, contact me and you may need to reevaluate your blood pressure medication.    Do not take your Xarelto 2 days before colonoscopy.  Restart the Xarelto after colonoscopy if no bleeding.  Your colonoscopy will  be due in April of this year.    Look on your [employer] portal for results of today's lab work.    Follow-up for physical exam this September.    If your neck pain worsens, contact me right away in clinic for reevaluation.    Meng Barboza MD        Current Outpatient Medications   Medication Sig Dispense Refill     bromfenac (PROLENSA) 0.07 % Drop        chlorthalidone (HYGROTEN) 25 MG tablet Take 0.5 tablets (12.5 mg total) by mouth daily. 45 tablet 2     latanoprost (XALATAN) 0.005 % ophthalmic solution USE ONE DROP IN OU D  4     spironolactone (ALDACTONE) 25 MG tablet Take 1 tablet (25 mg total) by mouth daily. 90 tablet 3     XARELTO 20 mg tablet TAKE 1 TABLET BY MOUTH EVERY EVENING. 90 tablet 3     No current facility-administered medications for this visit.      No Known Allergies  Social History     Tobacco Use     Smoking status: Never Smoker     Smokeless tobacco: Never Used   Substance Use Topics     Alcohol use: Yes     Frequency: Monthly or less     Drinks per session: 1 or 2     Binge frequency: Never     Comment: occ beer     Drug use: No

## 2021-06-15 NOTE — PROGRESS NOTES
Assessment & Plan:       1. Acute gout of right hand, unspecified cause  predniSONE (DELTASONE) 20 MG tablet      Medical Decision Making  Patient resents with acute onset swelling and redness of the right hand second DIP joint.  Physical exam and history are most consistent with acute gout flare.  Minimal concern for bacterial infection given focused erythema over the DIP joint only and no history of trauma or skin tears.  Patient further has no fevers, nausea, or vomiting.  Treat patient with short course of oral steroids.  Recommend continuing cold compresses.  Use acetaminophen if needed for discomfort.  Discussed signs of worsening symptoms and when to follow-up with PCP if no symptom improvement.    Patient Instructions   You were seen today for acute gout. With treatment, symptoms should be improving in 48-72 hours (or longer if symptoms have been present for several days).     Symptom management:  - If already taking a urate-lowering medication, may continue without interruption.  - Take prescribed prednisone as instructed  - May apply ice to the affected area for 10-15 minutes each hour as needed.  - Rest involved joint.    Reasons to return for immediate re-evaluation:  - Develop a fever of 100.4 or higher  - No improvement of symptoms after 5 days  - Redness and/or pain continues to spread  - Develop drainage from the knee          Subjective:       Rui Lopez is a 65 y.o. male here for evaluation of swelling of the right hand second PIP joint.  Onset of symptoms was 24 hours ago.  Patient denies any trauma or cuts to the skin around the joint.  He had similar symptoms 2 to 3 months ago that were not as severe and resolved on their own with cold compresses.  Patient denies history of gout.  No family history of gout.  Denies fevers, nausea, and vomiting.    The following portions of the patient's history were reviewed and updated as appropriate: allergies, current medications and problem  list.    Review of Systems  Pertinent items are noted in HPI.     Allergies  No Known Allergies    Family History   Problem Relation Age of Onset     Hypothyroidism Mother      Leukemia Mother      Arthritis Mother      Hypertension Father      Aortic aneurysm Father         AAA     Colon polyps Father      Kidney cancer Maternal Grandfather        Social History     Socioeconomic History     Marital status:      Spouse name: None     Number of children: None     Years of education: None     Highest education level: None   Occupational History     None   Social Needs     Financial resource strain: None     Food insecurity     Worry: None     Inability: None     Transportation needs     Medical: None     Non-medical: None   Tobacco Use     Smoking status: Never Smoker     Smokeless tobacco: Never Used   Substance and Sexual Activity     Alcohol use: Yes     Frequency: Monthly or less     Drinks per session: 1 or 2     Binge frequency: Never     Comment: occ beer     Drug use: No     Sexual activity: None   Lifestyle     Physical activity     Days per week: None     Minutes per session: None     Stress: None   Relationships     Social connections     Talks on phone: None     Gets together: None     Attends Voodoo service: None     Active member of club or organization: None     Attends meetings of clubs or organizations: None     Relationship status: None     Intimate partner violence     Fear of current or ex partner: None     Emotionally abused: None     Physically abused: None     Forced sexual activity: None   Other Topics Concern     None   Social History Narrative     None         Objective:       /71 (Patient Site: Left Arm, Patient Position: Sitting, Cuff Size: Adult Large)   Pulse 92   Temp 97.7  F (36.5  C) (Oral)   Resp 18   Wt (!) 257 lb (116.6 kg)   SpO2 97%   BMI 37.14 kg/m    General appearance: alert, appears stated age, cooperative, no distress and non-toxic  Skin: Swelling,  erythema, tenderness, and increased warmth to touch over the right hand second DIP joint only; skin otherwise intact, no purulence seen

## 2021-06-15 NOTE — PROGRESS NOTES
ASSESSMENT/PLAN:  1. Pulmonary emboli  We will check and see what these tests show.  He is can end up on long-term anticoagulation but it makes a difference whether or not his children will need something.  There was nothing suspicious seen on CT of the chest to suggest a lung cancer as an etiology, and a hypernephroma if that is within the kidney has not usually been associated with thrombophilia.  No other obvious etiology or exacerbating or inciting event occurred prior to this.  The superficial thrombophlebitis that he had in the left ankle early December should not be associated with progression to DVT, but in hindsight raise the question of whether or not he had some sort of thrombophilic state increasing risk.  - Factor V Leiden  - Prothrombin Gene Mutation ($$$)  - Phospholipid Ab (Cardiolip) IgM/IgG    2. Hypertension  Well-controlled on current meds will continue same.  His blood pressure was low when he is in the hospital having PEs.  - Basic Metabolic Panel    3. Acute saddle pulmonary embolism without acute cor pulmonale  Please see #1 above.  This is rather significant severe and potentially life-threatening.  - Factor V Leiden  - Prothrombin Gene Mutation ($$$)  - Phospholipid Ab (Cardiolip) IgM/IgG    4. Left kidney mass  We will check CT scan and check renal function before hand make sure it is okay to get dye  - CT Abdomen Pelvis Without Oral With Without IV Contrast; Future      Patient Instructions   Please note that if over the counter medications were taken off of your medication list, it is not because you are being asked to stop taking them.  does not want all of the over the counter medications on your medication list, as it bogs down the list.     Do not take ibuprofen. You may take Tylenol as needed.     CT abdomen to investigate kidney nodule.     Echo in April as scheduled          Return in about 1 month (around 2/8/2018) for follow up on pulmonary embolism and kidney  nodule.    All medications have been reconciled.    CHIEF COMPLAINT:  Chief Complaint   Patient presents with     Hospital Visit Follow Up     follow up PE       HISTORY OF PRESENT ILLNESS:  Rui Lopez is a 62 y.o. male presenting to the clinic today for a hospital follow up. He is accompanied by his wife.     Pulmonary Emboli: He was in [facility] from 1/[facility] - 1/4 for pulmonary emboli. He initially went to [facility] ER with the shortness of breath but was taken to [facility] after the CT was done. He was started on Xarelto for this. He notes that he did have superficial thrombophlebitis in his left leg prior to this hospitalization, but while he was there, his ultrasound showed that there was also a DVT in the left leg. There was no trauma or other trigger for this clot that he knows of. He did not have swelling in his leg, but he did have occasional cramping. He has never had clots before. His brother has had superficial clots in the past but no DVT's. He had a cough and some shortness of breath in November and inquires if that could have been related to his PE. He had a chest X-ray at that time that was normal. He never had any chest pain and only developed the shortness of breath the morning he went to the ER. He could not catch his breath after walking up the stairs and then passed out for a few seconds. He did not have the same shortness of breath the days prior. His pulse was elevated when he presented to the ER. He discussed doing labs to check for clotting abnormalities, but this was not done at the hospital. He is fine with having this done now.     Kidney Mass: There was a hypodensity seen in the left kidney on his chest CT. He will have an abdominal CT done to investigate this further.    Hypertension: His blood pressure is in a good range today. He was not given his blood pressure medications at all while in the hospital, but he is back on them now.     REVIEW OF SYSTEMS:   He had a bit of a  cold in the weeks prior to going to the hospital. He is not taking a daily aspirin. He has a lot of bruising in his left groin, but it is improving.  Comprehensive review of systems negative except as noted above.    PFSH:  He has children. He does not have a family history of clotting disorders.     TOBACCO USE:  History   Smoking Status     Never Smoker   Smokeless Tobacco     Not on file       VITALS:  Vitals:    01/08/18 1358   BP: 122/68   Pulse: 72     Wt Readings from Last 3 Encounters:   01/04/18 (!) 248 lb 1.6 oz (112.5 kg)   01/01/18 (!) 247 lb (112 kg)   12/05/17 (!) 255 lb (115.7 kg)     There is no height or weight on file to calculate BMI.    PHYSICAL EXAM:  General Appearance: Alert, cooperative, no distress, appears stated age.  Lungs: Clear to auscultation bilaterally, good air movement.  Heart: Regular rate and rhythm, S1 and S2 normal, no murmur or bruit.  Musculoskeletal: No gross abnormalities.  Extremities: No CCE, pulses II/IV and symmetric,   Neurologic: CNII-XII intact, sensory grossly intact  Psychiatric: he has a normal mood and affect.     Notes Reviewed, additional history from source other than patient (2 TOTAL): Reviewed 1/1 - 1/4 hospital notes regarding PE.     Accessed Care Everywhere, Requested Records, Consult with Physician (1 TOTAL): None.     Radiology tests summarized or ordered (XR, CT, MRI, DXA, US): Reviewed 1/1 chest CT. Reviewed 1/2 leg ultrasound. CT ordered.     Labs reviewed or ordered (1 TOTAL): Labs from 1/1 -1/4 reviewed. Labs ordered.     Medicine tests reviewed or ordered (ECG, echocardiogram, colonoscopy, EGD, venous US) (1 TOTAL): None.    Independent review of ECG or XR (2 EACH): None.      The visit lasted a total of 25 minutes face to face with the patient. Over 50% of the time was spent counseling and educating the patient about his recent PE.    Vipin GUAN, am scribing for and in the presence of, Dr. Stafford.    Dr. erick GUAN, personally performed  the services described in this documentation, as scribed by Vipin Watkins in my presence, and it is both accurate and complete.    MEDICATIONS:  Current Outpatient Prescriptions   Medication Sig Dispense Refill     chlorthalidone (HYGROTEN) 25 MG tablet Take 12.5 mg by mouth daily.       losartan (COZAAR) 50 MG tablet Take 50 mg by mouth daily.       rivaroxaban 15 mg Tab Take 1 tablet (15 mg total) by mouth 2 (two) times a day with meals for 21 days. 42 tablet 0     spironolactone (ALDACTONE) 25 MG tablet Take 25 mg by mouth daily.       [START ON 1/26/2018] rivaroxaban (XARELTO) 20 mg Tab Take 1 tablet (20 mg total) by mouth daily. 30 tablet 3     No current facility-administered medications for this visit.        Total data points: 4

## 2021-06-15 NOTE — PROGRESS NOTES
ASSESSMENT/PLAN:  1. Hypertension  We need to switch up his blood pressure medicines.  He does better on the chlorthalidone and spironolactone 10 just on the losartan.  His blood pressure was too low in the 3 items.  Therefore will switch and stop the losartan and restart 12.5 chlorthalidone 25 mg spironolactone.  Return in 2 weeks.    2. Edema  This is related to stopping his to diuretics.  Will go back on those as above    3. Pulmonary emboli  We discussed in detail again the issues and he is happy staying on the anticoagulant long-term.  Especially since he did not have any preceding symptoms.      Patient Instructions   Stop losartan.     Restart 12.5 mg of chlorthalidone and 25 mg of spironolactone daily.     Check blood pressure three times per week and let us know how it looks in about 2 weeks.     We will check labs in the next 2-3 weeks. Lab only appointment.     If the swelling does not go away, we will check an ultrasound. Let us know.     Avoid salt and salty foods.     Repeat Echo and CT chest at 3 month interval.         Return in about 2 weeks (around 2/20/2018) for lab only.    CHIEF COMPLAINT:  Chief Complaint   Patient presents with     follow up Pulmonary     follow up kidney       HISTORY OF PRESENT ILLNESS:  Rui Lopez is a 62 y.o. male presenting to the clinic today to follow up on his pulmonary embolism and hypertension.     Pulmonary Embolism: He suffered a massive PE in the beginning of January. He is now taking Xarelto daily. He presented earlier and had a superficial phlebitis in the left lower leg. He later had an ultrasound of his left leg that showed he had two clots. He never had any symptoms from the clots.     Hypertension: He stopped chlorthalidone because his blood pressure was running low and has subsequently developed swelling in his legs. His legs were very swollen last night, but they are looking a little better today. He is currently just taking losartan for his blood  pressure, but his pressure has gone up a little higher than he would like. His potassium went low from chlorthalidone in the past, so he took spironolactone with it.     REVIEW OF SYSTEMS:   He had an abdominal CT scan on 1/9/2018 that showed a 3 x 2 cm benign cyst of the upper pole of the left kidney. Comprehensive review of systems negative except as noted above.    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     TOBACCO USE:  History   Smoking Status     Never Smoker   Smokeless Tobacco     Never Used       VITALS:  Vitals:    02/06/18 1546   BP: 132/74   Pulse: 60     Wt Readings from Last 3 Encounters:   01/04/18 (!) 248 lb 1.6 oz (112.5 kg)   01/01/18 (!) 247 lb (112 kg)   12/05/17 (!) 255 lb (115.7 kg)     There is no height or weight on file to calculate BMI.    PHYSICAL EXAM:  General Appearance: Alert, cooperative, no distress, appears stated age.  Lungs: Clear to auscultation bilaterally, good air movement.  Heart: Regular rate and rhythm, S1 and S2 normal, no murmur or bruit.  Extremities: 2+ edema to about half way up the calf bilaterally. Skin is tight. Left medial leg shows no evidence of superficial clot.   Psychiatric: he has a normal mood and affect.     Notes Reviewed, additional history from source other than patient (2 TOTAL): None.    Accessed Care Everywhere, Requested Records, Consult with Physician (1 TOTAL): None.     Radiology tests summarized or ordered (XR, CT, MRI, DXA, US): Reviewed 1/9/2018 CT scan regarding kidney cyst.     Labs reviewed or ordered (1 TOTAL): Labs from 1/8/2018 reviewed - normal.     Medicine tests reviewed or ordered (ECG, echocardiogram, colonoscopy, EGD, venous US) (1 TOTAL): None.    Independent review of ECG or XR (2 EACH): None.    MEDICATIONS:  Current Outpatient Prescriptions   Medication Sig Dispense Refill     rivaroxaban (XARELTO) 20 mg Tab Take 1 tablet (20 mg total) by mouth daily. 30 tablet 3     chlorthalidone  (HYGROTEN) 25 MG tablet Take half tab (12.5 mg) by mouth daily. 90 tablet 3     spironolactone (ALDACTONE) 25 MG tablet Take 1 tablet (25 mg total) by mouth daily. 90 tablet 3     No current facility-administered medications for this visit.        The visit lasted a total of 18 minutes face to face with the patient. Over 50% of the time was spent counseling and educating the patient about his hypertension and PE.    IVipin, am scribing for and in the presence of, Dr. Stafford.    I, Dr. Stafford, personally performed the services described in this documentation, as scribed by Vipin Watkins in my presence, and it is both accurate and complete.    Dragon dictation was used for this note.  Speech recognition errors are a possibility.      Total data points: 2

## 2021-06-15 NOTE — TELEPHONE ENCOUNTER
Arthritis in finger, warm, kind of purple, really swollen, Has appt primary tomorrow.    [facility] after work 11am.  Now opens at 10am.    Agrees.    Zhanna Benavides RN [facility] Triage Nurse Advisor    Reason for Disposition    SEVERE pain (e.g., excruciating, unable use hand at all)    Additional Information    Negative: Followed an injury    Negative: Wound looks infected    Negative: Caused by an animal bite    Negative: Caused by frostbite    Negative: Hand or wrist pain is the main symptom    Negative: Looks infected (spreading redness, red streak, pus) and severe pain with movement    Negative: Patient sounds very sick or weak to the triager    Protocols used: FINGER PAIN-A-[place]

## 2021-06-15 NOTE — PATIENT INSTRUCTIONS - HE
You were seen today for acute gout. With treatment, symptoms should be improving in 48-72 hours (or longer if symptoms have been present for several days).     Symptom management:  - If already taking a urate-lowering medication, may continue without interruption.  - Take prescribed prednisone as instructed  - May apply ice to the affected area for 10-15 minutes each hour as needed.  - Rest involved joint.    Reasons to return for immediate re-evaluation:  - Develop a fever of 100.4 or higher  - No improvement of symptoms after 5 days  - Redness and/or pain continues to spread  - Develop drainage from the knee

## 2021-06-15 NOTE — PATIENT INSTRUCTIONS - HE
If blood pressure is running less than 110/50 or you feel lightheaded dizzy, contact me and you may need to reevaluate your blood pressure medication.    Do not take your Xarelto 2 days before colonoscopy.  Restart the Xarelto after colonoscopy if no bleeding.  Your colonoscopy will be due in April of this year.    Look on your [facility] portal for results of today's lab work.    Follow-up for physical exam this September.    If your neck pain worsens, contact me right away in clinic for reevaluation.

## 2021-06-16 PROBLEM — I26.99 PULMONARY EMBOLI (H): Status: ACTIVE | Noted: 2018-01-01

## 2021-06-16 PROBLEM — E66.01 MORBID OBESITY (H): Status: ACTIVE | Noted: 2019-04-08

## 2021-06-16 PROBLEM — Z51.81 MEDICATION MONITORING ENCOUNTER: Status: ACTIVE | Noted: 2018-07-30

## 2021-06-16 PROBLEM — Z86.0100 HISTORY OF COLONIC POLYPS: Status: ACTIVE | Noted: 2018-07-30

## 2021-06-16 PROBLEM — Z86.711 HISTORY OF PULMONARY EMBOLISM: Status: ACTIVE | Noted: 2018-07-30

## 2021-06-17 NOTE — PATIENT INSTRUCTIONS - HE
Patient Instructions by Bret De La Fuente DO at 4/8/2019  3:20 PM     Author: Bret De La Fuente DO Service: -- Author Type: Physician    Filed: 4/8/2019  3:38 PM Encounter Date: 4/8/2019 Status: Signed    : Bret De La Fuente DO (Physician)         Patient education: What is a sleep study?     What is a sleep study? -- A sleep study is a test that measures how well you sleep and checks for sleep problems. For some sleep studies, you stay overnight in a sleep lab at a hospital or sleep center.     What happens during a sleep study? -- Before you go to sleep, a technician attaches small, sticky patches called electrodes to your head, chest, and legs. He or she will also place a small tube beneath your nose and might wrap 1 or 2 belts around your chest.   Each of these items has wires that connect to monitors. The monitors record your movement, brain activity, breathing, and other body functions while you sleep.  If you have a history of trouble falling asleep, your doctor might prescribe a medicine to help you fall asleep in the lab. If you have never taken the medicine before, your doctor might ask you take it on a night before your sleep study to see how it affects you.   Why might my doctor order a sleep study? -- Your doctor will order a sleep study if he or she thinks you have sleep apnea or a different condition that makes you:   ?Have sudden jerking leg movements while you sleep, called periodic limb movements.   ?Feel very sleepy during the day and fall asleep all of a sudden, called narcolepsy.   ?Have trouble falling asleep or staying asleep over a long period of time, called chronic insomnia.   ?Do odd things while you sleep, such as walking.  How should I prepare for a sleep study? -- On the day of your sleep study, you should:   ?Avoid alcohol   ?Avoid drinking coffee, tea, sodas, and other drinks that have caffeine in the afternoon and evening   ?Take all of your regular medicines    The cost of  care estimate line is [phone]. They are able to give the patient an estimate of the charges and also an estimate of their insurance coverage/patient responsibility.    Please bring one tab of low dose melatonin 3 mg or less to the night of the study.    If the patient wishes to take the melatonin. It is completely voluntary.    Patient may take own melatonin after arrival to sleep center. Do not drive or operate machinery after intake of melatonin.

## 2021-06-17 NOTE — PATIENT INSTRUCTIONS - HE
"Patient Instructions by Bret De La Fuente DO at 7/29/2019  3:00 PM     Author: Bret De La Fuente DO Service: -- Author Type: Physician    Filed: 7/29/2019  3:04 PM Encounter Date: 7/29/2019 Status: Signed    : Bret De La Fuente DO (Physician)         What is sleep apnea?    Sleep apnea is a condition that makes you stop breathing for short periods while you are asleep. There are 2 types of sleep apnea. One is called \"obstructive sleep apnea,\" and the other is called \"central sleep apnea.\"  In obstructive sleep apnea, you stop breathing because your throat narrows or closes (figure 1). In central sleep apnea, you stop breathing because your brain does not send the right signals to your muscles to make you breathe. When people talk about sleep apnea, they are usually referring to obstructive sleep apnea, which is what this article is about.  People with sleep apnea do not know that they stop breathing when they are asleep. But they do sometimes wake up startled or gasping for breath. They also often hear from loved ones that they snore.  What are the symptoms of sleep apnea? -- The main symptoms of sleep apnea are loud snoring, tiredness, and daytime sleepiness. Other symptoms can include:  ?Restless sleep  ?Waking up choking or gasping  ?Morning headaches, dry mouth, or sore throat  ?Waking up often to urinate  ?Waking up feeling unrested or groggy  ?Trouble thinking clearly or remembering things  Some people with sleep apnea don't have symptoms, or they don't know they have them. They might figure that it's normal to be tired or to snore a lot.  Should I see a doctor or nurse? -- Yes. If you think you might have sleep apnea, see your doctor.  Is there a test for sleep apnea? -- Yes. If your doctor or nurse suspects you have sleep apnea, he or she might send you for a \"sleep study.\" Sleep studies can sometimes be done at home, but they are usually done in a sleep lab. For the study, you spend the night in the " "lab, and you are hooked up to different machines that monitor your heart rate, breathing, and other body functions. The results of the test will tell your doctor or nurse if you have the disorder.  Is there anything I can do on my own to help my sleep apnea? -- Yes. Here are some things that might help:  ?Stay off your back when sleeping. (This is not always practical, because people cannot control their position while asleep. Plus, it only helps some people.)  ?Lose weight, if you are overweight  ?Avoid alcohol, because it can make sleep apnea worse  How is sleep apnea treated? -- The most effective treatment for sleep apnea is a device that keeps your airway open while you sleep. Treatment with this device is called \"continuous positive airway pressure,\" or CPAP. People getting CPAP wear a face mask at night that keeps them breathing (figure 2).  If your doctor or nurse recommends a CPAP machine, try to be patient about using it. The mask might seem uncomfortable to wear at first, and the machine might seem noisy, but using the machine can really pay off. People with sleep apnea who use a CPAP machine feel more rested and generally feel better.  There is also another device that you wear in your mouth called an \"oral appliance\" or \"mandibular advancement device.\" It also helps keep your airway open while you sleep.  In rare cases, when nothing else helps, doctors recommend surgery to keep the airway open. Surgery to do this is not always effective, and even when it is, the problem can come back.  Is sleep apnea dangerous? -- It can be. People with sleep apnea do not get good-quality sleep, so they are often tired and not alert. This puts them at risk for car accidents and other types of accidents. Plus, studies show that people with sleep apnea are more likely than others to have high blood pressure, heart attacks, and other serious heart problems. In people with severe sleep apnea, getting treated (for example, " with a CPAP machine) can help prevent some of these problems.    GRAPHICS  Airway in a person with sleep apnea    Normally when a person sleeps, the airway remains open, and air can pass from the nose and mouth to the lungs. In a person with sleep apnea, parts of the throat and mouth drop into the airway and block off the flow of air. This can cause loud snoring and interrupt breathing for short periods.  Graphic 70187 Version 5.0    Continuous positive airway pressure (CPAP) for sleep apnea    The CPAP mask gently blows air into your nose while you sleep. It puts just enough pressure on your airway to keep it from closing. The mask in this picture fits over just the nose. Other CPAP devices have masks that fit over the nose and mouth.

## 2021-06-17 NOTE — TELEPHONE ENCOUNTER
Refill Request  Medication name:   Requested Prescriptions     Pending Prescriptions Disp Refills     rivaroxaban ANTICOAGULANT (XARELTO) 20 mg tablet 90 tablet 3     Sig: Take 1 tablet (20 mg total) by mouth every evening.     Who prescribed the medication: lakshmi  Last refill on medication: 02/17/21  Requested Pharmacy: [facility]  Last appointment with PCP: 03/02/21  Next appointment: Not due    Denisa Braga RT (R)

## 2021-06-17 NOTE — PROGRESS NOTES
Assessment/Plan:        Diagnoses and all orders for this visit:    Acute saddle pulmonary embolism with acute cor pulmonale    63-year-old man with pulmonary embolism here for follow-up.  Clinically and based on echocardiogram he is improved a lot.  I repeated his echocardiogram because the degree of right heart failure on his initial study needed follow-up.    Because this was not a provoked pulmonary embolism his risk of recurrence is high-greater than 10% per year-off of anticoagulation.  He will need to be on lifelong anticoagulation.  Medications like Xarelto are associated with less bleeding risk and warfarin.  This is balanced by more difficulty with reversal and increased cost.  It is a patient centered decision and seems to be appropriate for him.    He should continue to stay active and keep his weight stable or decreased slowly.  Any significant changes in his breathing should be investigated.  He should have age-appropriate cancer screening.    His genetic evaluation did not show any abnormalities.    I do not think he needs any more follow-up in pulmonary clinic.  We are available for any questions.        Subjective:    Patient ID: Rui Lopez is a 63 y.o. male.    HPI  63-year-old man here for follow-up of pulmonary embolism.  His symptoms of not feeling well and shortness of breath improved significantly immediately after his lytics during his hospital stay.  Since then his breathing has slowly normalized.  He has a little bit of left-sided swelling that is more than the right but he feels like that is stable.  There are no other provocative palliative or localizing factors.  No pertinent positives.  Pertinent negatives include no hemoptysis, no significant lower extremity edema.    He is tolerating his anticoagulation well without any issues.    Review of Systems  Of 12 systems was negative except as in HPI.        Objective:    Physical Exam   Constitutional: He is oriented to person, place,  and time. He appears well-developed and well-nourished. No distress.   HENT:   Head: Normocephalic.   Nose: Nose normal.   Mouth/Throat: Oropharynx is clear and moist. No oropharyngeal exudate.   Eyes: Pupils are equal, round, and reactive to light. Right eye exhibits no discharge. Left eye exhibits no discharge. No scleral icterus.   Neck: Normal range of motion. No JVD present. No tracheal deviation present. No thyromegaly present.   Cardiovascular: Normal rate and regular rhythm.  Exam reveals no gallop and no friction rub.    No murmur heard.  Pulmonary/Chest: Effort normal and breath sounds normal. No stridor. No respiratory distress. He has no wheezes. He has no rales.   Abdominal: Soft. Bowel sounds are normal. He exhibits no distension. There is no tenderness.   Musculoskeletal: He exhibits no edema or tenderness.   Lymphadenopathy:     He has no cervical adenopathy.   Neurological: He is alert and oriented to person, place, and time. No cranial nerve deficit.   Skin: Skin is warm and dry. No rash noted. He is not diaphoretic. No erythema. No pallor.   Psychiatric: He has a normal mood and affect. His behavior is normal. Judgment and thought content normal.           Current Outpatient Prescriptions on File Prior to Visit   Medication Sig Dispense Refill     chlorthalidone (HYGROTEN) 25 MG tablet Take half tab (12.5 mg) by mouth daily. 90 tablet 3     rivaroxaban (XARELTO) 20 mg Tab Take 1 tablet (20 mg total) by mouth daily. 30 tablet 3     spironolactone (ALDACTONE) 25 MG tablet Take 1 tablet (25 mg total) by mouth daily. 90 tablet 3     No current facility-administered medications on file prior to visit.      /66  Pulse 74  Resp 17  Wt (!) 254 lb (115.2 kg)  SpO2 95% Comment: RA  BMI 36.45 kg/m2    Medical History  Active Ambulatory (Non-Hospital) Problems    Diagnosis     Pulmonary emboli     Adenomatous colon polyp (04/2016)     Sarcoidosis of lung with sarcoidosis of lymph nodes      Hypertension     Hematuria     Past Medical History:   Diagnosis Date     High cholesterol      Hypertension         Surgical History  He  has a past surgical history that includes Cataract extraction (Left, 2006); Retinal detachment surgery (Left, 2005); Retinal detachment surgery (Right, 2004); Cataract extraction (Right, 2004); and Cystectomy (1973).       Social History  Reviewed, and he  reports that he has never smoked. He has never used smokeless tobacco. He reports that he drinks alcohol. He reports that he does not use illicit drugs.    Maintenance at [employer].   Allergies  No Known Allergies Family History  Reviewed, and family history includes Aortic aneurysm in his father; Arthritis in his mother; Colon polyps in his father; Hypertension in his father; Hypothyroidism in his mother; Kidney cancer in his maternal grandfather; Leukemia in his mother.  No family history of blood clots.    No health issues in in kids except allergies,                          Data Review - imaging, labs, and ekgs listed below were reviewed by me.  Chest XRay and chest CT images and EKG tracings interpreted personally.     Past Labs  Hospital Outpatient Visit on 04/18/2018   Component Date Value     LV volume diastolic 04/18/2018 71.4      LV volume systolic 04/18/2018 31.2      IVSd 04/18/2018 0.915      LVIDd 04/18/2018 4.81      LVIDs 04/18/2018 3.48      LVOT diam 04/18/2018 2      LVOT mean gradient 04/18/2018 1      LVOT peak VTI 04/18/2018 15.3      LVOT mean natty 04/18/2018 50.3      LVOT peak natty 04/18/2018 70.9      LVOT peak gradient 04/18/2018 2      LV PWd 04/18/2018 0.961      MV E' lat natty 04/18/2018 9.19      AO root 04/18/2018 3.2      LA size 04/18/2018 3.7      LA/AO root ratio 04/18/2018 1.16      MV peak A natty 04/18/2018 84.5      MV peak E natty 04/18/2018 63.6      BSA 04/18/2018 2.35      Hieght 04/18/2018 70      Weight 04/18/2018 3968      BP 04/18/2018 132/74      IVS/PW ratio 04/18/2018 1.0      LV  FS 04/18/2018 27.7      Echo LVEF calculated 04/18/2018 56      LV mass 04/18/2018 156.7      MV E/A Ratio 04/18/2018 0.8      LVOT area 04/18/2018 3.14      LVOT SV 04/18/2018 48.0      LV systolic volume index 04/18/2018 13.3      LV diastolic volume index 04/18/2018 30.4      LV mass index 04/18/2018 66.7      LV SVi 04/18/2018 20.4      TAPSE 04/18/2018 2.4      MV lat E/e' ratio 04/18/2018 6.9      Height 04/18/2018 70.0      Weight 04/18/2018 248      His thrombophilia workup was done and was negative.  No evidence of prothrombin, factor V Leiden or other mutations.

## 2021-06-18 NOTE — LETTER
Letter by Meng Barboza MD at      Author: Meng Barboza MD Service: -- Author Type: --    Filed:  Encounter Date: 2/1/2019 Status: (Other)       Rui Lopez  [address] [place] [id number]             February 1, 2019         Dear Mr. Lopez,    Below are the results from your recent visit:    Resulted Orders   Basic Metabolic Panel   Result Value Ref Range    Sodium 139 136 - 145 mmol/L    Potassium 4.1 3.5 - 5.0 mmol/L    Chloride 102 98 - 107 mmol/L    CO2 27 22 - 31 mmol/L    Anion Gap, Calculation 10 5 - 18 mmol/L    Glucose 100 70 - 125 mg/dL    Calcium 9.4 8.5 - 10.5 mg/dL    BUN 26 (H) 8 - 22 mg/dL    Creatinine 1.29 0.70 - 1.30 mg/dL    GFR MDRD Af Amer >60 >60 mL/min/1.73m2    GFR MDRD Non Af Amer 57 (L) >60 mL/min/1.73m2    Narrative    Fasting Glucose reference range is 70-99 mg/dL per  American Diabetes Association (ADA) guidelines.       Creatinine is improved and within your baseline range.  BUN level is okay.  Potassium level is normal.    Blood sugar is normal.    Labs look good.  No change in treatment plan.    Please call with questions or contact us using [email or web address].    Sincerely,        Electronically signed by Meng Barboza MD

## 2021-06-19 NOTE — PROGRESS NOTES
MALE PREVENTATIVE EXAM    Assessment and Plan:       1. Health care maintenance  His main issue is the pulmonary embolism history.    He appears to have some moderate cardiovascular risk factors including a low HDL.  He is not interested in a statin drug at this time, but is planning to do some ultrasound vascular screening.  I did discuss with him cardiac CT scan for calcium score for further risk stratification that he could consider in the future.    Emphasized need for weight loss and reduce simple carbohydrates in diet.  I recommended 20 minutes of walking on a regular basis.    He was just seen by ophthalmology earlier this month and given a one-year follow-up for routine checkup.    No evidence of recurrent sarcoid on recent chest imaging in January.    2. History of pulmonary embolism  Lifelong Xarelto.  Seen by pulmonary medicine in May but pulmonary did not feel the need to follow-up with them unless new event.    Right heart strain resolved on echo in May.  Echo report reviewed with patient.    Chronic lower extremity edema with varicose veins, but patient does not wish to wear compression stockings.    3. History of colonic polyps  5 year follow-up colonoscopy April 2021    4. Benign essential hypertension  Controlled, appears to be running on the low side tolerating.  I did discuss options for medication adjustment with patient, but he states that he had very high blood pressure in the past when he went off the chlorthalidone and prefers to stay on current medications.    As long as kidney labs are okay, plan to continue on current meds and he was given a six-month follow-up to reevaluate blood pressure and other medical issues.    Cholesterol discussion with patient as above  - Lipid Cascade    5. Medication monitoring encounter    - Comprehensive Metabolic Panel  - HM2(CBC w/o Differential)    6. Screening for prostate cancer  Patient does wish to do prostate cancer screening.  I did discuss risk of  false positives and false negatives.  Patient accepts these risks and wishes to proceed with screening.  - PSA (Prostatic-Specific Antigen), Annual Screen    Skin abnormality: Mole on his right cheek, appears benign and well demarcated but somewhat irregular, I suspect from previous trauma from razor.  Was offered a referral to dermatology, but he declined.  He was told that there is any change she should seek medical attention with dermatology for evaluation.  I did tell him that I could not rule out skin cancer, but he did decline a referral to dermatology.    Mild intermittent left knee pain.  Patient knows not to take any NSAIDs or pain medication other than Tylenol.  If pain worsens he was offered referral to orthopedic clinic, but he declined today.    Next follow up:  No Follow-up on file.    Immunization Review  Adult Imm Review: No immunizations due today        I discussed the following with the patient:   Adult Healthy Living: Importance of regular exercise        Subjective:   Chief Complaint: Rui Lopez is an 63 y.o. male here for a preventative health visit.     HPI: 63-year-old male here for health [facility] physical exam and to establish care as a new patient, previously had seen Dr. Garcia.    Patient had an acute pulmonary embolism, saddle pulmonary embolism January 2018 with acute onset shortness of breath and chest pain.  He was given lytics.  Cardiac echo did show right heart strain at the time.    Patient is now on lifelong Xarelto.  He saw pulmonary medicine in May, who recommended lifelong anticoagulation given the unclear precipitating event of the pulmonary embolism, with possible history of previous undiagnosed DVT with his chronic lower extremity edema.    He did have a hypercoagulable workup in January that was negative.    No clear precipitating factor but he does have a past history of chronic lower extremity edema especially on the right leg for 5-6 years varicose veins moderate  obesity.  He denies he has had a pulmonary embolism in the past, but he did not have symptoms of his DVT in the left leg which was diagnosed in January.    He has had continued mild lower extremity edema but he does not wish to wear compression stockings.    Follow-up cardiac echo May 2018 showed normal right ventricular function and size, ejection fraction 56% and no heart valve problems.    He has not had any worsening shortness of breath, denies any dyspnea on exertion.  He walks fairly regularly at his work as a , but does not have a regular exercise plan.    Hypertension has been controlled, but tends to be on the low side.  110-115 over 60s-70s on his checks.  But he denies orthostasis or lightheaded dizzy spells.  His blood pressure was not well controlled when he came off the diuretic.  He was changed back to the chlorthalidone in February of this year.    He is currently on chlorthalidone 12.5 mg a day and spironolactone 25 mg a day.  He has had some borderline elevated creatinine 1.3 with range of 1.1-1.3.    He has never smoked.    Occasional alcohol but rare.    He does not stick to a clear diet, does have moderate excess of simple carbohydrates and is obese.    He is a number of uncles and aunts on his mother's side of diabetes but his mother does not have diabetes.    His mother had a stroke about 2 years ago.  His father had hypertension and an aneurysm.    Patient denies family history of coronary artery disease.    Patient did have a CT scan of the abdomen and pelvis January 2018, there was a benign-appearing 2 cm left renal cyst.  No AAA.    Patient is planning to do a [facility] screening ultrasound later this year for carotid and additional vascular screening.  Patient has not had a previous cardiac CT scan.  No previous stress testing.    July 2017 HDL 36 and LDL 79.  He has not tried a statin drug and he is inclined not to use a statin drug.    No palpitations or history of  arrhythmia.    He is , his wife is a nurse.  Lives independently.    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  But he did not get treatment and on follow-up chest imaging it had resolved.  There is no recurrence.  November 2017 chest x-ray was reported as normal.    Patient had a colonoscopy April 2016 with 2 polyps and given a 5 year follow-up.  His bowels are normal.  He denies any blood in stool or melena.  Denies abdominal pain.  He denies swallowing problems or heartburn.    He denies urinary problems.  PSA was 0.2 July 2017.    Patient states he has had some mild intermittent left knee achiness on and off for the past year and a half.  He does not feel he needs to see a orthopedic doctor.  He does not use NSAIDs    He did see ophthalmology approximately 2 weeks ago.  Previous cataract surgery as well as previous retinal detachment in 2004.  No acute vision changes.  Given a one year follow-up.    He denies headache issues.  He states he sleeps well at night but his wife has not noticed any apnea spells, and apparently she has look forward.  He does snore.  He denies significant daytime sleepiness.    Patient noticed a new mole on his right cheek, but states he has had it there for over 5 years and it is not significantly changed.  He does occasionally hit it with a razor.  No history of skin cancer.    Healthy Habits  Are you taking a daily aspirin? No  Do you typically exercising at least 40 min, 3-4 times per week?  NO  Do you usually eat at least 4 servings of fruit and vegetables a day, include whole grains and fiber and avoid regularly eating high fat foods? NO  Have you had an eye exam in the past two years? Yes  Do you see a dentist twice per year? Yes  Do you have any concerns regarding sleep? No    Safety Screen  If you own firearms, are they  "secured in a locked gun cabinet or with trigger locks? The patient does not own any firearms  Do you feel you are safe where you are living?: Yes (2/6/2018  3:46 PM)  Do you feel you are safe in your relationship(s)?: Yes (2/6/2018  3:46 PM)    Review of Systems:  Please see above.  The rest of the review of systems are negative for all systems.     Cancer Screening       Status Date      COLONOSCOPY Next Due 4/25/2021      Done 4/25/2016 COLONOSCOPY EXTERNAL RESULT     Patient has more history with this topic...          Patient Care Team:  Meng Barboza MD as PCP - General (Internal Medicine)        History     Not marked as reviewed during this visit.            Objective:   Vital Signs:   Visit Vitals     /70 (Patient Site: Right Arm, Patient Position: Sitting, Cuff Size: Adult Regular)     Pulse 81     Ht 5' 9.5\" (1.765 m)     Wt (!) 254 lb 3.2 oz (115.3 kg)     SpO2 96%     BMI 37 kg/m2          PHYSICAL EXAM  Moderately obese male.  Alert and oriented ×3 with normal mood and affect.  Pupils and irises equal and reactive.  No jaundice or conjunctivitis.  Extraocular muscles intact.  Tympanic membranes are normal.  Pharynx is normal and not significant crowded.  Just mild neck adiposity.  Teeth in good condition.  No leukoplakia or oral lesions.  No cervical or supraclavicular or axillary or inguinal adenopathy.  No JVD and no carotid bruits.  No thyromegaly or nodularity.  Lungs are clear to auscultation with normal respiratory excursion.  Heart is regular with no murmur rub or gallop.  Abdomen is obese but nontender no hepatosplenomegaly and no pulsatile mass.  He does have trace to +1 edema on the left leg and trace edema on the right leg with moderate varicose veins.  No evidence of thrombophlebitis.  No calf tenderness.  Feet in good condition, no pre-ulcerative calluses or numbness.  +1 pedal pulses bilaterally.  Gait within normal limits.  Spine is straight.  Testicles normal bilaterally and no " inguinal hernia.  Normal external genitalia.  His prostate is smooth symmetric and normal size.  No rectal mass.  Skin exam with a keloid scar in anterior chest.    A well demarcated but somewhat irregular dermal nevus on his right cheek, as described above.             Medication List          These changes are accurate as of 7/30/18  5:49 PM.  If you have any questions, ask your nurse or doctor.               CONTINUE taking these medications          chlorthalidone 25 MG tablet   Also known as:  HYGROTEN   INSTRUCTIONS:  Take half tab (12.5 mg) by mouth daily.           latanoprost 0.005 % ophthalmic solution   Also known as:  XALATAN   INSTRUCTIONS:  USE ONE DROP IN OU D           spironolactone 25 MG tablet   Also known as:  ALDACTONE   INSTRUCTIONS:  TAKE 1 TABLET(25 MG) BY MOUTH DAILY           XARELTO 20 mg Tab   INSTRUCTIONS:  TAKE 1 TABLET BY MOUTH DAILY   Generic drug:  rivaroxaban                 Additional Screenings Completed Today:

## 2021-06-19 NOTE — LETTER
Letter by Meng Barboza MD at      Author: Meng Barboza MD Service: -- Author Type: --    Filed:  Encounter Date: 8/28/2019 Status: (Other)         Rui Lopez  [address] [place] [id number]             August 28, 2019         Dear Mr. Lopez,    Below are the results from your recent visit:    Resulted Orders   Comprehensive Metabolic Panel   Result Value Ref Range    Sodium 138 136 - 145 mmol/L    Potassium 3.8 3.5 - 5.0 mmol/L    Chloride 99 98 - 107 mmol/L    CO2 28 22 - 31 mmol/L    Anion Gap, Calculation 11 5 - 18 mmol/L    Glucose 88 70 - 125 mg/dL    BUN 20 8 - 22 mg/dL    Creatinine 1.37 (H) 0.70 - 1.30 mg/dL    GFR MDRD Af Amer >60 >60 mL/min/1.73m2    GFR MDRD Non Af Amer 53 (L) >60 mL/min/1.73m2    Bilirubin, Total 0.7 0.0 - 1.0 mg/dL    Calcium 9.3 8.5 - 10.5 mg/dL    Protein, Total 6.6 6.0 - 8.0 g/dL    Albumin 4.0 3.5 - 5.0 g/dL    Alkaline Phosphatase 82 45 - 120 U/L    AST 24 0 - 40 U/L    ALT 27 0 - 45 U/L    Narrative    Fasting Glucose reference range is 70-99 mg/dL per  American Diabetes Association (ADA) guidelines.   HM2(CBC w/o Differential)   Result Value Ref Range    WBC 10.1 4.0 - 11.0 thou/uL    RBC 5.41 4.40 - 6.20 mill/uL    Hemoglobin 17.6 14.0 - 18.0 g/dL    Hematocrit 51.7 40.0 - 54.0 %    MCV 95 80 - 100 fL    MCH 32.4 27.0 - 34.0 pg    MCHC 34.0 32.0 - 36.0 g/dL    RDW 11.3 11.0 - 14.5 %    Platelets 171 140 - 440 thou/uL    MPV 8.5 7.0 - 10.0 fL   Lipid Cascade   Result Value Ref Range    Cholesterol 195 <=199 mg/dL    Triglycerides 271 (H) <=149 mg/dL    HDL Cholesterol 44 >=40 mg/dL    LDL Calculated 97 <=129 mg/dL    Patient Fasting > 8hrs? Yes    PSA (Prostatic-Specific Antigen), Annual Screen   Result Value Ref Range    PSA 0.2 0.0 - 4.5 ng/mL    Narrative    Method is Abbott Prostate-Specific Antigen (PSA)  Standard-WHO 1st International (90:10)       Creatinine level is within baseline range, and okay at current level.    Potassium level is normal.    Blood  sugar is normal.    Liver tests are normal.    Hemoglobin and blood counts are normal.    Excellent cholesterol levels.    Still moderately elevated triglycerides, continue to work on diet and exercise.    Prostate test is normal.    No change in treatment plan.    Please call with questions or contact us using [facility].    Sincerely,        Electronically signed by Meng Barboza MD

## 2021-06-20 ENCOUNTER — HEALTH MAINTENANCE LETTER (OUTPATIENT)
Age: 66
End: 2021-06-20

## 2021-06-23 NOTE — PATIENT INSTRUCTIONS - HE
Continue on same medications.    Checking kidney labs today, results are okay I will mail you the results.    If kidney lab results are okay, return in 6 months for physical exam.    You have been given a referral to the sleep clinic for further evaluation of your sleep and daytime sleepiness.  You will be contacted to set up a referral to the sleep clinic.

## 2021-06-23 NOTE — PROGRESS NOTES
[facility] clinic Follow Up Note    Rui Lopez   63 y.o. male    Date of Visit: 1/31/2019    Chief Complaint   Patient presents with     6 month checkup     Subjective  Rui is here for hypertension follow-up.  He also had a sleep disorder issue he wanted to bring up.    Is a new complaint of daytime sleepiness, tends to be sleepy in the afternoon when he gets home from work, often falls asleep in a chair.  He falls asleep okay early in the evening but tends to wake up at 3 or 4 in the morning and has difficulty falling back asleep.  He has to get up early at 5 AM to go to work.    His wife says he snores but no witnessed apnea.  He is not had a previous sleep evaluation.  He is moderately obese.    Hypertension has been well controlled.  Blood pressure running 110-120/60-75 and his checks.  That stable from previous.    He does have mild renal insufficiency and had a slightly higher creatinine 1.4 last July.    Usual creatinine 1.2-1.3.    He is on the lower dose chlorthalidone 12.5 mg a day in addition to spironolactone 25 mg a day.  He has been on those medications for many years.    He does not have significant lower extremity edema except for trace chronic edema on the left from history of recurrent DVT.    He is on chronic Xarelto, lifelong for history of recurrent DVTs.  No increasing shortness of breath or chest pain.    No chest pressure or chest pain.    He does not get regular exercise.  He walks at work.    He has not had a cardiac CT scan or carotid evaluation previously.    July 2018  and HDL 44.  Triglycerides were 272.  He did not want to start on a statin.    No history of diabetes.    Ophthalmology last month, glaucoma well-controlled on current drops.    No vision changes or new headaches.    No abdominal pain complaints.  No new cough.    No falls or head trauma.  No bleeding issues with Xarelto.    PMHx:    Past Medical History:   Diagnosis Date     High cholesterol       Hypertension      PSHx:    Past Surgical History:   Procedure Laterality Date     CATARACT EXTRACTION Left 2006     CATARACT EXTRACTION Right 2004     CYSTECTOMY  1973    Testicular cystectomy     RETINAL DETACHMENT SURGERY Left 2005     RETINAL DETACHMENT SURGERY Right 2004     Immunizations:   Immunization History   Administered Date(s) Administered     Tdap 08/13/2013       ROS A comprehensive review of systems was performed and was otherwise negative    Medications, allergies, and problem list were reviewed and updated    Exam  /76 (Patient Site: Right Arm, Patient Position: Sitting, Cuff Size: Adult Large)   Pulse 72   Resp 16   Wt (!) 256 lb (116.1 kg)   BMI 37.26 kg/m    Alert and oriented x3.  Appears well.  No jaundice.  No JVD.  Lungs are clear.  Heart is regular without murmur.  Abdomen is moderately obese but nontender and no hepatomegaly.  He has trace edema in the left and none on the right.  Able to clamp on the exam table.    Assessment/Plan  1. Benign essential hypertension  Well-controlled.  Borderline low but denies orthostasis and blood pressure is stable.  If creatinine level and electrolytes are okay, plan to continue on the same medication and follow-up in 6 months for his physical.    These had high spiking blood pressures when he went off chlorthalidone entirely in the past.    Moderate obesity and does need to improve diet and regular exercise.    I am referring him to sleep clinic for sleep apnea evaluation.    Recheck cholesterol next summer at his physical.  Could consider cardiac CT scan and carotid artery ultrasound screen for evaluation, to determine if he may benefit from a statin drug given his hypertension history.    2. History of pulmonary embolism  No evidence of recurrence.  Some chronic trace edema in the left leg.  Lifelong Xarelto.    3. Fatigue, unspecified type  Moderate suspicion for sleep apnea.  - Ambulatory referral to Sleep Medicine    4. Medication  monitoring encounter  Borderline elevated creatinine, recheck today.  - Basic Metabolic Panel    Return in about 6 months (around 7/31/2019) for Annual physical.   Patient Instructions   Continue on same medications.    Checking kidney labs today, results are okay I will mail you the results.    If kidney lab results are okay, return in 6 months for physical exam.    You have been given a referral to the sleep clinic for further evaluation of your sleep and daytime sleepiness.  You will be contacted to set up a referral to the sleep clinic.    Meng Barboza MD  I spent a total time with patient of over 25 minutes and over 50% coord care.  Time all face to face.      Current Outpatient Medications   Medication Sig Dispense Refill     chlorthalidone (HYGROTEN) 25 MG tablet TAKE 1/2 TABLET(12.5 MG) BY MOUTH DAILY (Patient taking differently: TAKE 1/2 TABLET(12.5 MG) BY MOUTH IN THE MORNING) 45 tablet 2     latanoprost (XALATAN) 0.005 % ophthalmic solution USE ONE DROP IN OU D  4     spironolactone (ALDACTONE) 25 MG tablet Take 1 tablet (25 mg total) by mouth daily. (Patient taking differently: Take 25 mg by mouth every morning.       ) 90 tablet 2     XARELTO 20 mg Tab TAKE 1 TABLET BY MOUTH ONCE DAILY. (Patient taking differently: TAKE 1 TABLET BY MOUTH EVERY EVENING.) 90 tablet 2     No current facility-administered medications for this visit.      No Known Allergies  Social History     Tobacco Use     Smoking status: Never Smoker     Smokeless tobacco: Never Used   Substance Use Topics     Alcohol use: Yes     Comment: occ beer     Drug use: No

## 2021-06-30 NOTE — PROGRESS NOTES
Progress Notes by Esme Bautista CMA at 12/2/2020  2:00 PM     Author: Esme Bautista CMA Service: -- Author Type: Certified Medical Assistant    Filed: 12/3/2020  4:25 PM Encounter Date: 12/2/2020 Status: Signed    : Esme Bautista CMA (Certified Medical Assistant)       This video/telephone visit will be conducted via a call between you and your physician/provider. We have found that certain health care needs can be provided without the need for an in-person physical exam. This service lets us provide the care you need with a video /telephone conversation. If a prescription is necessary we can send it directly to your pharmacy. If lab work is needed we can place an order for that and you can then stop by our lab to have the test done at a later time.   Just as we bill insurance for in-person visits, we also bill insurance for video/telephone visits. If you have questions about your insurance coverage, we recommend that you speak with your insurance company.   Patient has given verbal consent for video/Telephone visit? Yes  Patient would like the video visit invitation sent by: Link E-mail:    CORONA/SREEKANTH LUGO CMA  Patient was unable to connect via [employer], he has a flip phone so tried to help walk him through downloading [employer] onto his computer, he was not able to get that to work either, stated he doesn't know computers and got very angry, using profanity, I gave him the Patient IT Support Number and he got very angry about that also, the patient then preceded to hang up on me.  Alec Rowland patient can call us back if he wants to reschedule his appt and should be scheduled in [facility] as he in new and needs a video visit.  Patient verified allergies, medications and pharmacy via phone. PHQ: 6, Mood(Current): 0 and MARTA: 2 done verbally with writer.     [place]  reviewed prior to appt, please see embedded report below:

## 2021-07-01 VITALS
HEIGHT: 70 IN | DIASTOLIC BLOOD PRESSURE: 70 MMHG | WEIGHT: 262 LBS | SYSTOLIC BLOOD PRESSURE: 112 MMHG | HEART RATE: 84 BPM | TEMPERATURE: 98 F | BODY MASS INDEX: 37.51 KG/M2

## 2021-07-01 VITALS
HEART RATE: 72 BPM | BODY MASS INDEX: 36.13 KG/M2 | TEMPERATURE: 97.2 F | WEIGHT: 250 LBS | SYSTOLIC BLOOD PRESSURE: 108 MMHG | DIASTOLIC BLOOD PRESSURE: 70 MMHG

## 2021-08-02 DIAGNOSIS — I10 ESSENTIAL HYPERTENSION: ICD-10-CM

## 2021-08-02 NOTE — TELEPHONE ENCOUNTER
Refill Request  Medication name: Pending Prescriptions:                       Disp   Refills    chlorthalidone (HYGROTON) 25 MG tablet    45 tab*2            Sig: Take 0.5 tablets (12.5 mg) by mouth daily    Who prescribed the medication: Meng Barboza MD  Last refill on medication: 11/10/2020  Requested Pharmacy: [facility]  Last appointment with PCP: 3/2/2021  Next appointment: Not due

## 2021-08-03 PROBLEM — I26.92 ACUTE SADDLE PULMONARY EMBOLISM WITHOUT ACUTE COR PULMONALE (H): Status: RESOLVED | Noted: 2018-01-01 | Resolved: 2018-01-08

## 2021-08-03 RX ORDER — CHLORTHALIDONE 25 MG/1
12.5 TABLET ORAL DAILY
Qty: 45 TABLET | Refills: 2 | Status: SHIPPED | OUTPATIENT
Start: 2021-08-03 | End: 2022-05-06

## 2021-09-08 ENCOUNTER — OFFICE VISIT (OUTPATIENT)
Dept: INTERNAL MEDICINE | Facility: CLINIC | Age: 66
End: 2021-09-08
Payer: COMMERCIAL

## 2021-09-08 VITALS
SYSTOLIC BLOOD PRESSURE: 108 MMHG | WEIGHT: 260.8 LBS | HEIGHT: 69 IN | HEART RATE: 80 BPM | BODY MASS INDEX: 38.63 KG/M2 | DIASTOLIC BLOOD PRESSURE: 64 MMHG

## 2021-09-08 DIAGNOSIS — Z86.711 HISTORY OF PULMONARY EMBOLISM: ICD-10-CM

## 2021-09-08 DIAGNOSIS — Z51.81 ENCOUNTER FOR THERAPEUTIC DRUG MONITORING: ICD-10-CM

## 2021-09-08 DIAGNOSIS — I10 ESSENTIAL HYPERTENSION: ICD-10-CM

## 2021-09-08 DIAGNOSIS — Z12.5 SCREENING FOR PROSTATE CANCER: ICD-10-CM

## 2021-09-08 DIAGNOSIS — Z00.00 HEALTH MAINTENANCE EXAMINATION: Primary | ICD-10-CM

## 2021-09-08 DIAGNOSIS — Z87.39 HISTORY OF GOUT: ICD-10-CM

## 2021-09-08 DIAGNOSIS — G47.33 OBSTRUCTIVE SLEEP APNEA ON CPAP: ICD-10-CM

## 2021-09-08 DIAGNOSIS — M48.02 SPINAL STENOSIS IN CERVICAL REGION: ICD-10-CM

## 2021-09-08 DIAGNOSIS — M72.2 PLANTAR FASCIITIS: ICD-10-CM

## 2021-09-08 DIAGNOSIS — Z86.0100 HISTORY OF COLONIC POLYPS: ICD-10-CM

## 2021-09-08 LAB
ALBUMIN SERPL-MCNC: 3.8 G/DL (ref 3.5–5)
ALP SERPL-CCNC: 90 U/L (ref 45–120)
ALT SERPL W P-5'-P-CCNC: 18 U/L (ref 0–45)
ANION GAP SERPL CALCULATED.3IONS-SCNC: 13 MMOL/L (ref 5–18)
AST SERPL W P-5'-P-CCNC: 20 U/L (ref 0–40)
BILIRUB SERPL-MCNC: 0.5 MG/DL (ref 0–1)
BUN SERPL-MCNC: 21 MG/DL (ref 8–22)
CALCIUM SERPL-MCNC: 9 MG/DL (ref 8.5–10.5)
CHLORIDE BLD-SCNC: 102 MMOL/L (ref 98–107)
CO2 SERPL-SCNC: 27 MMOL/L (ref 22–31)
CREAT SERPL-MCNC: 1.46 MG/DL (ref 0.7–1.3)
ERYTHROCYTE [DISTWIDTH] IN BLOOD BY AUTOMATED COUNT: 12.1 % (ref 10–15)
GFR SERPL CREATININE-BSD FRML MDRD: 50 ML/MIN/1.73M2
GLUCOSE BLD-MCNC: 107 MG/DL (ref 70–125)
HCT VFR BLD AUTO: 49.3 % (ref 40–53)
HGB BLD-MCNC: 16.8 G/DL (ref 13.3–17.7)
MCH RBC QN AUTO: 31 PG (ref 26.5–33)
MCHC RBC AUTO-ENTMCNC: 34.1 G/DL (ref 31.5–36.5)
MCV RBC AUTO: 91 FL (ref 78–100)
PLATELET # BLD AUTO: 184 10E3/UL (ref 150–450)
POTASSIUM BLD-SCNC: 4.2 MMOL/L (ref 3.5–5)
PROT SERPL-MCNC: 6.4 G/DL (ref 6–8)
PSA SERPL-MCNC: 0.17 UG/L (ref 0–4.5)
RBC # BLD AUTO: 5.42 10E6/UL (ref 4.4–5.9)
SODIUM SERPL-SCNC: 142 MMOL/L (ref 136–145)
URATE SERPL-MCNC: 8.3 MG/DL (ref 3–8)
WBC # BLD AUTO: 10.6 10E3/UL (ref 4–11)

## 2021-09-08 PROCEDURE — 85027 COMPLETE CBC AUTOMATED: CPT | Performed by: INTERNAL MEDICINE

## 2021-09-08 PROCEDURE — 84550 ASSAY OF BLOOD/URIC ACID: CPT | Performed by: INTERNAL MEDICINE

## 2021-09-08 PROCEDURE — 80053 COMPREHEN METABOLIC PANEL: CPT | Performed by: INTERNAL MEDICINE

## 2021-09-08 PROCEDURE — 36415 COLL VENOUS BLD VENIPUNCTURE: CPT | Performed by: INTERNAL MEDICINE

## 2021-09-08 PROCEDURE — G0103 PSA SCREENING: HCPCS | Performed by: INTERNAL MEDICINE

## 2021-09-08 PROCEDURE — 99396 PREV VISIT EST AGE 40-64: CPT | Performed by: INTERNAL MEDICINE

## 2021-09-08 RX ORDER — CHOLECALCIFEROL (VITAMIN D3) 50 MCG
1 TABLET ORAL DAILY
COMMUNITY

## 2021-09-08 ASSESSMENT — PATIENT HEALTH QUESTIONNAIRE - PHQ9: SUM OF ALL RESPONSES TO PHQ QUESTIONS 1-9: 0

## 2021-09-08 ASSESSMENT — MIFFLIN-ST. JEOR: SCORE: 1967.32

## 2021-09-08 NOTE — PATIENT INSTRUCTIONS
Do not take Xarelto for 2 days before your colonoscopy and do not take the day of colonoscopy.  Restart Xarelto day after colonoscopy if no major bleeding.    Look on your [facility] computer portal for results of today's lab work.    Get a flu shot this fall.    Plan to get a COVID-19 booster vaccine in October.    You could consider Shingrix shingles vaccine in the future.    Focus on weight loss.  You do need to lose 20 pounds.  Go on a strict no simple carbohydrate diet.  That means no pasta, no bread or baked goods, no rice or potatoes.  Avoid sweets.  Avoid eating large meals in the evening.  Do not drink any sugary beverages.    20 minutes of walking or aerobic-type activity daily is recommended.    If you do have difficulty losing weight, you can request a bariatric clinic consult to discuss medications to help you lose weight.    If you are having blood pressures less than 110/60 with lightheaded dizzy spells, contact me to consider a lower dose of your blood pressure medications, chlorthalidone and spironolactone.  Goal blood pressure less than 135/85.

## 2021-09-08 NOTE — PROGRESS NOTES
SUBJECTIVE:   CC: Rui Lopez is an 66 year old male who presents for preventative health visit.     Obese male with severe sleep apnea diagnosed in 2019.  He is now 100% compliant with CPAP with minimal daytime sleepiness, feels he sleeps well at night.  No palpitations or history of atrial fibrillation.    Lower extremity edema is well controlled, just trace on the left where he had a previous DVT.    His blood pressure has been running on the low side around 110/60 but he denies any orthostasis.    He had some mild renal insufficiency with creatinine 1.3-1.4 previously.    Patient does prefer to continue on the current chlorthalidone 12.5 mg a day and spironolactone 25 mg a day.    Patient does have some plantar fasciitis pain in the balls of his feet bilaterally but over the past 2 months he has been working with [employer] shoes and now has house shoes and good orthotic shoes with inserts, has been having some improvement over the past couple of months.    Occasional neuroma type symptoms with paresthesias on his toes when on his feet more or when first walking in the morning.    No radicular type lower back pain issues.    Patient had a history of severe cervical stenosis with previous left C5-6 radicular symptoms with planned laminectomy and fusion previously but with weight loss and treatment of his sleep apnea, he had resolution of the left arm radicular pain.  No significant neck pain issues now.    September 2020 MRI of the neck did show moderate to severe central canal stenosis in the cervical region.    No chest pain or chest pressure.  He has been walking some but does not have a regular exercise routine.    He is gained 10 pounds since the spring.    He does admit to eating larger meals of the evening.    No history of diabetes.    History of DVT and pulmonary embolism, recurrence in 2018.  On chronic Xarelto.  No bleeding issues.    Has never smoked.  No new cough.  No mouth sores or swallowing  difficulty.    2016 colonoscopy with 2 polyps, he does have his colonoscopy scheduled for .    Lipoma, controlled in the spring.  Has an appointment this .  Sees Dr. Pickard.    He had an episode of gout in March after eating hot dogs and shrimp, but no recurrence with avoiding those food items.  Uric acid level was 9.0 in March.  But he does not have frequent gout does not want to use allopurinol.    His father  2020.  His mother had a previous stroke, has been helping care for her.    His son gives immunizations at the pharmacy and he plans to get his flu shot later from his son.  Patient has been advised of split billing requirements and indicates understanding: Yes     Healthy Habits:     Getting at least 3 servings of Calcium per day:  NO    Bi-annual eye exam:  Yes    Dental care twice a year:  NO    Sleep apnea or symptoms of sleep apnea:  None    Diet:  Other    Frequency of exercise:  None    Taking medications regularly:  Yes    Barriers to taking medications:  None    Medication side effects:  None    PHQ-2 Total Score: 0    Additional concerns today:  No      Today's PHQ-2 Score:   PHQ-2 (  [employer]) 2021   Q1: Little interest or pleasure in doing things 0   Q2: Feeling down, depressed or hopeless 0   PHQ-2 Score 0   Q1: Little interest or pleasure in doing things Not at all   Q2: Feeling down, depressed or hopeless Not at all   PHQ-2 Score 0       Abuse: Current or Past(Physical, Sexual or Emotional)- No  Do you feel safe in your environment? Yes    Have you ever done Advance Care Planning? (For example, a Health Directive, POLST, or a discussion with a medical provider or your loved ones about your wishes): Patient is full code    Social History     Tobacco Use     Smoking status: Never Smoker     Smokeless tobacco: Never Used   Substance Use Topics     Alcohol use: Yes     Comment: Alcoholic Drinks/day: occ beer     Alcohol Use 2021   Prescreen: >3 drinks/day  "or >7 drinks/week? No     Last PSA:   Prostate Specific Antigen Screen   Date Value Ref Range Status   09/02/2020 0.2 0.00 - 4.50 ng/mL Final       Reviewed orders with patient. Reviewed health maintenance and updated orders accordingly - Yes  Lab work is in process    Reviewed and updated as needed this visit by clinical staff  Tobacco  Allergies  Meds              Reviewed and updated as needed this visit by Provider                Past Medical History:   Diagnosis Date     DVT (deep vein thrombosis) in pregnancy      High cholesterol      History of pulmonary embolus (PE)      Hypertension      Leg swelling      Numbness and tingling      Sarcoidosis       Past Surgical History:   Procedure Laterality Date     CATARACT EXTRACTION Left 2006     CATARACT EXTRACTION Right 2004     CYSTECTOMY  1973    Testicular cystectomy     IR PULMONARY ANGIOGRAM BILATERAL  1/2/2018     IR THROMBOLYTICS -ARTERIAL INITIAL DAY  1/1/2018     RETINAL DETACHMENT SURGERY Left 2005     RETINAL DETACHMENT SURGERY Right 2004       Review of Systems  No headache complaints.  No vision changes.  No swallowing problems or mouth sores.  No new cough or increasing shortness of breath.  No urinary symptoms, no significant nocturia.  Bowels are regular without blood in stool.  No falls.  No abdominal pain complaints.  OBJECTIVE:   /64   Pulse 80   Ht 1.759 m (5' 9.25\")   Wt 118.3 kg (260 lb 12.8 oz)   BMI 38.24 kg/m      Physical Exam  Obese male.  Alert and oriented x3 with good mood and affect.  Pupils and irises equal and reactive.  Extraocular muscles intact.  No jaundice or conjunctivitis.  External ears and nose exam is normal.  Tympanic membranes are normal.  No cervical or supraclavicular adenopathy.  No JVD and no carotid bruits.  No thyromegaly or nodularity.  Lungs are clear to auscultation with good respiratory excursion.  Heart is regular without murmur rub or gallop.  +1 pedal pulses bilaterally and no ankle edema " except for trace on the left, chronic.  Some loss of fat pad on his feet bilaterally, I did not feel a specific neuroma or ganglion cyst.  Toe movements appear normal.  Skin otherwise is intact, no preulcerative callus.  He does have high arches.  Abdomen is obese with moderate ventral hernia and periumbilical hernia reducible.  Abdomen nontender.  No pulsatile abdominal mass.  Mild hepatomegaly at about 1 to 2 cm.  No splenomegaly.  He declined  exam.  Exam shows scar on anterior chest, no other suspicious skin lesions.  ASSESSMENT/PLAN:   (Z00.00) Health maintenance examination  (primary encounter diagnosis)  Comment: Patient's main issue is obesity with sleep apnea.  I stressed the importance of weight loss and improving his daily exercise plan.  We discussed in detail reduced simple carbohydrate diet.  Plan: I did also discuss consideration for bariatric clinic for medication treatment if needed is having difficulty losing weight.    We will wait on getting the flu shot this fall.  We discussed getting a Covid booster shot this fall when available.    We also discussed Shingrix vaccine that he could consider in the future.    Glaucoma follow-up with ophthalmology due this fall, he will make an appointment    (Z86.010) History of colonic polyps  Comment: Colonoscopy scheduled October 11.  Plan: Hold Xarelto for 3 days, see patient instructions    (Z12.5) Screening for prostate cancer  Comment: No nocturia  Plan: Prostate Specific Antigen Screen        He declined exam    (I10) Essential hypertension  Comment: Blood pressure well controlled on the low side.  He denies orthostasis.  Mild renal insufficiency has been stable.    Patient prefers to continue on the current chlorthalidone and spironolactone.  Could consider reduction in spironolactone if lower blood pressures or worsening creatinine.  Plan: Work on weight loss.    September 2020  and HDL 43.  Not planning statin.  I did discuss option for a  "screening cardiac CT scan for calcium score, or consider carotid ultrasound in the future.  He declined that at this time.  He is thinking about doing a [facility] ultrasound screen in the future.  He was nonfasting today.    (G47.33,  Z99.89) Obstructive sleep apnea on CPAP  Comment: Highly compliant with CPAP.  Has found significant improvement with CPAP.  Plan: Continue current CPAP    (M48.02) Spinal stenosis in cervical region  Comment: No recurrent radicular pain.  Losing weight and treating sleep apnea likely did help this issue.  Plan: Follow-up if recurrent radicular pain.  Work on weight loss.    (Z86.711) History of pulmonary embolism  Comment: Chronic Xarelto  Plan: Chronic left lower extremity edema    (Z87.39) History of gout  Comment: No recent recurrence.  He will avoid hotdogs and shrimp.  Plan: Uric acid        If increasing episodes of gout, consider allopurinol    (M72.2) Plantar fasciitis  Comment: High arches.  Obesity.  He is now wearing good orthotic shoes at all times including house shoes.  Continue to lose weight.  Plan: I did discuss option for referral to podiatry for custom orthotics if not improving    (Z51.81) Encounter for therapeutic drug monitoring  Comment:   Plan: Comprehensive metabolic panel, CBC with         platelets              Patient has been advised of split billing requirements and indicates understanding: No  COUNSELING:   Reviewed preventive health counseling, as reflected in patient instructions       Regular exercise       Healthy diet/nutrition       Colon cancer screening       Prostate cancer screening    Estimated body mass index is 38.24 kg/m  as calculated from the following:    Height as of this encounter: 1.759 m (5' 9.25\").    Weight as of this encounter: 118.3 kg (260 lb 12.8 oz).     Weight management plan: Discussed healthy diet and exercise guidelines    He reports that he has never smoked. He has never used smokeless tobacco.      Counseling " Resources:  ATP IV Guidelines  Pooled Cohorts Equation Calculator  FRAX Risk Assessment  ICSI Preventive Guidelines  Dietary Guidelines for Americans, 2010  USDA's MyPlate  ASA Prophylaxis  Lung CA Screening    Meng Barboza MD  [facility]

## 2021-09-28 DIAGNOSIS — I10 ESSENTIAL HYPERTENSION: ICD-10-CM

## 2021-09-28 RX ORDER — SPIRONOLACTONE 25 MG/1
25 TABLET ORAL DAILY
Qty: 90 TABLET | Refills: 3 | Status: SHIPPED | OUTPATIENT
Start: 2021-09-28 | End: 2022-09-09

## 2021-09-28 NOTE — TELEPHONE ENCOUNTER
Refill Request  Medication name: Pending Prescriptions:                       Disp   Refills    spironolactone (ALDACTONE) 25 MG tablet   90 tab*3            Sig: Take 1 tablet (25 mg) by mouth daily    Who prescribed the medication: Jabari  Last refill on medication: 06/30/21  Requested Pharmacy: [facility]  Last appointment with PCP: 09/08/21  Next appointment: Appointment scheduled for 03/08/22

## 2021-09-30 ENCOUNTER — OFFICE VISIT (OUTPATIENT)
Dept: FAMILY MEDICINE | Facility: CLINIC | Age: 66
End: 2021-09-30
Payer: COMMERCIAL

## 2021-09-30 VITALS
OXYGEN SATURATION: 96 % | HEART RATE: 72 BPM | WEIGHT: 260 LBS | DIASTOLIC BLOOD PRESSURE: 80 MMHG | BODY MASS INDEX: 38.12 KG/M2 | RESPIRATION RATE: 14 BRPM | TEMPERATURE: 98.6 F | SYSTOLIC BLOOD PRESSURE: 128 MMHG

## 2021-09-30 DIAGNOSIS — H81.10 BENIGN PAROXYSMAL POSITIONAL VERTIGO, UNSPECIFIED LATERALITY: Primary | ICD-10-CM

## 2021-09-30 DIAGNOSIS — R42 VERTIGO: ICD-10-CM

## 2021-09-30 PROCEDURE — 99213 OFFICE O/P EST LOW 20 MIN: CPT | Performed by: FAMILY MEDICINE

## 2021-09-30 NOTE — PATIENT INSTRUCTIONS
"Search for \"Epley maneuver\" on YouTube for demonstrations on how to do the exercises at home to help dislodge the crystal in your inner ear.  Follow-up with Dr. Barboza if your symptoms are getting worse or not improving over the next week and he may want to refer you to physical therapy for further evaluation.  I would like you to go directly to the emergency department if you develop any shortness of breath, chest pain, palpitations, headaches, vision changes, numbness or tingling or weakness in your arms or legs, slurred speech, or difficulty walking, or any other concerning symptoms.  Patient Education     Benign Paroxysmal Positional Vertigo     Your health care provider may move your head in certain ways to treat your BPPV.   Benign paroxysmal positional vertigo (BPPV) is a problem with the inner ear. The inner ear contains the vestibular system. This system is what helps you keep your balance. BPPV causes a feeling of spinning. It is a common problem of the vestibular system.   Understanding the vestibular system  The vestibular system of the ear is made up of very tiny parts. They include the utricle, saccule, and semicircular canals. The utricle is a tiny organ that contains calcium crystals. In some people, the crystals can move into the semicircular canals. When this happens, the system no longer works as it should. This causes BPPV. Benign means it is not life threatening. Paroxysmal means it happens suddenly. Positional means that it happens when you move your head. Vertigo is a feeling of spinning.   What causes BPPV?  Causes include injury to your head or neck. Other problems with the vestibular system may cause BPPV. In many people, the cause of BPPV is not known.   Symptoms of BPPV  You may have repeated feelings of spinning (vertigo). The vertigo usually lasts less than 1 minute. Some movements, such as rolling over in bed, can bring on vertigo.   Diagnosing BPPV  Your primary healthcare provider " may diagnose and treat your BPPV. Or you may see an ear, nose, and throat healthcare provider (otolaryngologist). In some cases, you may see a healthcare provider who focuses on the nervous system (neurologist).   The healthcare provider will ask about your symptoms and your medical history. He or she will examine you. You may have hearing and balance tests. As part of the exam, your healthcare provider may have you move your head and body in certain ways. If you have BPPV, the movements can bring on vertigo. Your provider will also look for abnormal movements of your eyes. You may have other tests to check your vestibular or nervous systems.   Treatment for BPPV  Your healthcare provider may try to move the calcium crystals. This is done by having you move your head and neck in certain ways. This treatment is safe and often works well. You may also be told to do these movements at home. You may still have vertigo for a few weeks. Your healthcare provider will recheck your symptoms, usually in about a month. Special physical therapy may also be part of treatment. In rare cases, surgery may be needed for BPPV that does not go away. Talk with your healthcare provider about whether it is safe for you to drive.   When to call the healthcare provider  Call your healthcare provider right away if you have any of these:    Symptoms that do not go away with treatment    Symptoms that get worse    New symptoms  Cassie last reviewed this educational content on 2/1/2020 2000-2021 [employer]. All rights reserved. This information is not intended as a substitute for professional medical care. Always follow your healthcare professional's instructions.

## 2021-09-30 NOTE — PROGRESS NOTES
"Assessment:       Benign paroxysmal positional vertigo, unspecified laterality  Vertigo     Plan:     Patient with new onset vertigo that started today.  Patient thoroughly evaluated for red flag symptoms and does not display any.  Symptoms seem to be usable with bending over and favor benign paroxysmal positional vertigo.  Discussed signs and symptoms of when to go to the emergency department as noted below in patient instructions.  He will search for Epley maneuvers on YouTube to work on this at home and will follow up with his primary care provider in the next several days if his symptoms fail to improve as he may need referral to physical therapy versus further evaluation if he develops any new or concerning symptoms.  No indication at this time for imaging or further evaluation unless developing other new or concerning symptoms.    Patient Instructions     Search for \"Epley maneuver\" on YouTube for demonstrations on how to do the exercises at home to help dislodge the crystal in your inner ear.  Follow-up with Dr. Barboza if your symptoms are getting worse or not improving over the next week and he may want to refer you to physical therapy for further evaluation.  I would like you to go directly to the emergency department if you develop any shortness of breath, chest pain, palpitations, headaches, vision changes, numbness or tingling or weakness in your arms or legs, slurred speech, or difficulty walking, or any other concerning symptoms.  Patient Education     Benign Paroxysmal Positional Vertigo     Your health care provider may move your head in certain ways to treat your BPPV.   Benign paroxysmal positional vertigo (BPPV) is a problem with the inner ear. The inner ear contains the vestibular system. This system is what helps you keep your balance. BPPV causes a feeling of spinning. It is a common problem of the vestibular system.   Understanding the vestibular system  The vestibular system of the ear is " made up of very tiny parts. They include the utricle, saccule, and semicircular canals. The utricle is a tiny organ that contains calcium crystals. In some people, the crystals can move into the semicircular canals. When this happens, the system no longer works as it should. This causes BPPV. Benign means it is not life threatening. Paroxysmal means it happens suddenly. Positional means that it happens when you move your head. Vertigo is a feeling of spinning.   What causes BPPV?  Causes include injury to your head or neck. Other problems with the vestibular system may cause BPPV. In many people, the cause of BPPV is not known.   Symptoms of BPPV  You may have repeated feelings of spinning (vertigo). The vertigo usually lasts less than 1 minute. Some movements, such as rolling over in bed, can bring on vertigo.   Diagnosing BPPV  Your primary healthcare provider may diagnose and treat your BPPV. Or you may see an ear, nose, and throat healthcare provider (otolaryngologist). In some cases, you may see a healthcare provider who focuses on the nervous system (neurologist).   The healthcare provider will ask about your symptoms and your medical history. He or she will examine you. You may have hearing and balance tests. As part of the exam, your healthcare provider may have you move your head and body in certain ways. If you have BPPV, the movements can bring on vertigo. Your provider will also look for abnormal movements of your eyes. You may have other tests to check your vestibular or nervous systems.   Treatment for BPPV  Your healthcare provider may try to move the calcium crystals. This is done by having you move your head and neck in certain ways. This treatment is safe and often works well. You may also be told to do these movements at home. You may still have vertigo for a few weeks. Your healthcare provider will recheck your symptoms, usually in about a month. Special physical therapy may also be part of  treatment. In rare cases, surgery may be needed for BPPV that does not go away. Talk with your healthcare provider about whether it is safe for you to drive.   When to call the healthcare provider  Call your healthcare provider right away if you have any of these:    Symptoms that do not go away with treatment    Symptoms that get worse    New symptoms  Cassie last reviewed this educational content on 2/1/2020 2000-2021 [employer]. All rights reserved. This information is not intended as a substitute for professional medical care. Always follow your healthcare professional's instructions.               Subjective:       66 year old male presents with his wife for evaluation of onset of vertigo when he bent over to tie his shoes earlier this morning.  When he sits up and stares at something in the distance the vertigo subsides within 10 to 15 seconds.  The vertigo is reproducible with bending over and has happened multiple times throughout the day.  He otherwise denies any vertigo with turning his head in other directions and is not lightheaded when he stands up.  He denies any headaches, vision changes, ringing in his ears, hearing loss, nausea, vomiting, chest pain, palpitations, shortness of breath, slurred speech, difficulty ambulating, or numbness, tingling, weakness, or pain down his extremities.  Otherwise feels fine and has not had any fevers and has been feeling like his normal self.    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)     Adenomatous colon polyp (04/2016)     Pulmonary emboli (H)     History of pulmonary embolism     History of colonic polyps     Medication monitoring encounter     Obesity (BMI 35.0-39.9) with comorbidity (H)       Past Medical History:   Diagnosis Date     DVT (deep vein thrombosis) in pregnancy      High cholesterol      History of pulmonary embolus (PE)      Hypertension       Leg swelling      Numbness and tingling      Sarcoidosis        Past Surgical History:   Procedure Laterality Date     CATARACT EXTRACTION Left 2006     CATARACT EXTRACTION Right 2004     CYSTECTOMY  1973    Testicular cystectomy     IR PULMONARY ANGIOGRAM BILATERAL  1/2/2018     IR THROMBOLYTICS -ARTERIAL INITIAL DAY  1/1/2018     RETINAL DETACHMENT SURGERY Left 2005     RETINAL DETACHMENT SURGERY Right 2004       Current Outpatient Medications   Medication     bromfenac (PROLENSA) 0.07 % Drop     chlorthalidone (HYGROTON) 25 MG tablet     latanoprost (XALATAN) 0.005 % ophthalmic solution     rivaroxaban ANTICOAGULANT (XARELTO) 20 mg tablet     spironolactone (ALDACTONE) 25 MG tablet     vitamin D3 (VITAMIN D3) 50 mcg (2000 units) tablet     No current facility-administered medications for this visit.       No Known Allergies    Family History   Problem Relation Age of Onset     Hypothyroidism Mother      Leukemia Mother      Arthritis Mother      Hypertension Father      Aortic aneurysm Father         AAA     Colon Polyps Father      Kidney Cancer Maternal Grandfather        Social History     Socioeconomic History     Marital status:      Spouse name: None     Number of children: None     Years of education: None     Highest education level: None   Occupational History     None   Tobacco Use     Smoking status: Never Smoker     Smokeless tobacco: Never Used   Substance and Sexual Activity     Alcohol use: Yes     Comment: Alcoholic Drinks/day: occ beer     Drug use: No     Sexual activity: None   Other Topics Concern     None   Social History Narrative     None     Social Determinants of Health     Financial Resource Strain:      Difficulty of Paying Living Expenses:    Food Insecurity:      Worried About Running Out of Food in the Last Year:      Ran Out of Food in the Last Year:    Transportation Needs:      Lack of Transportation (Medical):      Lack of Transportation (Non-Medical):    Physical  Activity:      Days of Exercise per Week:      Minutes of Exercise per Session:    Stress:      Feeling of Stress :    Social Connections:      Frequency of Communication with Friends and Family:      Frequency of Social Gatherings with Friends and Family:      Attends [employer] Services:      Active Member of Clubs or Organizations:      Attends Club or Organization Meetings:      Marital Status:    Intimate Partner Violence:      Fear of Current or Ex-Partner:      Emotionally Abused:      Physically Abused:      Sexually Abused:          Review of Systems  A comprehensive review of systems was negative.      Objective:     /80   Pulse 72   Temp 98.6  F (37  C) (Oral)   Resp 14   Wt 117.9 kg (260 lb)   SpO2 96%   BMI 38.12 kg/m       General appearance: alert, appears stated age and cooperative  Head: Normocephalic, without obvious abnormality, atraumatic  Eyes: Pupils equal round and reactive to light, fundi are normal.  Ears: TMs and ear canals normal bilaterally.  Throat: lips, mucosa, and tongue normal; teeth and gums normal  Neck: no adenopathy, no carotid bruit, no JVD, supple, symmetrical, trachea midline and thyroid not enlarged, symmetric, no tenderness/mass/nodules  Lungs: clear to auscultation bilaterally  Heart: Regular rate and rhythm without murmurs  Extremities: Well perfused without edema  Pulses: 2+ and symmetric  Skin: Skin color, texture, turgor normal. No rashes or lesions  Neurologic: Alert and oriented X 3, normal strength and tone. Normal symmetric reflexes. Normal coordination and gait  Coordination: Able to reproduce go with bending over with some nystagmus noted but subsides quickly when sitting up.  Lulu-Hallpike maneuver otherwise unremarkable.  Romberg normal, finger-nose-finger normal.  Rapid alternating hand movements normal.  Heel-to-shin is normal.  No pronator drift.  Gait: Normal       This note has been dictated using voice recognition software. Any grammatical or  context distortions are unintentional and inherent to the software

## 2021-10-10 ENCOUNTER — HEALTH MAINTENANCE LETTER (OUTPATIENT)
Age: 66
End: 2021-10-10

## 2021-10-11 ENCOUNTER — TRANSFERRED RECORDS (OUTPATIENT)
Dept: HEALTH INFORMATION MANAGEMENT | Facility: CLINIC | Age: 66
End: 2021-10-11

## 2022-03-08 ENCOUNTER — OFFICE VISIT (OUTPATIENT)
Dept: INTERNAL MEDICINE | Facility: CLINIC | Age: 67
End: 2022-03-08
Payer: COMMERCIAL

## 2022-03-08 VITALS
SYSTOLIC BLOOD PRESSURE: 120 MMHG | HEIGHT: 69 IN | WEIGHT: 257 LBS | DIASTOLIC BLOOD PRESSURE: 72 MMHG | OXYGEN SATURATION: 95 % | BODY MASS INDEX: 38.06 KG/M2 | HEART RATE: 96 BPM

## 2022-03-08 DIAGNOSIS — Z86.711 HISTORY OF PULMONARY EMBOLISM: ICD-10-CM

## 2022-03-08 DIAGNOSIS — Z51.81 ENCOUNTER FOR THERAPEUTIC DRUG MONITORING: ICD-10-CM

## 2022-03-08 DIAGNOSIS — I10 ESSENTIAL HYPERTENSION: Primary | ICD-10-CM

## 2022-03-08 DIAGNOSIS — M72.2 PLANTAR FASCIITIS: ICD-10-CM

## 2022-03-08 DIAGNOSIS — Z86.0100 HISTORY OF COLONIC POLYPS: ICD-10-CM

## 2022-03-08 DIAGNOSIS — G47.33 OBSTRUCTIVE SLEEP APNEA ON CPAP: ICD-10-CM

## 2022-03-08 DIAGNOSIS — Z51.81 MEDICATION MONITORING ENCOUNTER: ICD-10-CM

## 2022-03-08 LAB
ANION GAP SERPL CALCULATED.3IONS-SCNC: 12 MMOL/L (ref 5–18)
BUN SERPL-MCNC: 18 MG/DL (ref 8–22)
CALCIUM SERPL-MCNC: 8.9 MG/DL (ref 8.5–10.5)
CHLORIDE BLD-SCNC: 101 MMOL/L (ref 98–107)
CO2 SERPL-SCNC: 26 MMOL/L (ref 22–31)
CREAT SERPL-MCNC: 1.37 MG/DL (ref 0.7–1.3)
GFR SERPL CREATININE-BSD FRML MDRD: 57 ML/MIN/1.73M2
GLUCOSE BLD-MCNC: 138 MG/DL (ref 70–125)
POTASSIUM BLD-SCNC: 3.7 MMOL/L (ref 3.5–5)
SODIUM SERPL-SCNC: 139 MMOL/L (ref 136–145)

## 2022-03-08 PROCEDURE — 99214 OFFICE O/P EST MOD 30 MIN: CPT | Performed by: INTERNAL MEDICINE

## 2022-03-08 PROCEDURE — 36415 COLL VENOUS BLD VENIPUNCTURE: CPT | Performed by: INTERNAL MEDICINE

## 2022-03-08 PROCEDURE — 80048 BASIC METABOLIC PNL TOTAL CA: CPT | Performed by: INTERNAL MEDICINE

## 2022-03-08 NOTE — PATIENT INSTRUCTIONS
No change in medication.    Your foot symptoms are suggestive of plantar fasciitis, possibly with some mild peripheral neuropathy.  Would recommend tennis ball massaging on the ball of your foot on a regular basis to help reduce the tightness and pain in your feet.  Continue to wear your orthotic shoes at all times.    Continue to work on weight loss and reducing carbohydrate and starchy foods in diet.    You can see a podiatrist for further discussion on your foot symptoms, if they worsen in the future.    Follow-up this fall for your physical exam.

## 2022-03-08 NOTE — PROGRESS NOTES
[facility] clinic Follow Up Note    Rui Lopez   67 year old male    Date of Visit: 3/8/2022    Chief Complaint   Patient presents with     Follow Up     6 month check - notfasting - numbness in both feet, goes up into toes     Subjective  Rui is a 67-year-old male here for follow-up on hypertension.  His blood pressures has been running in the 120s/70s without orthostasis or edema.  On chlorthalidone 12.5 mg a day and spironolactone 25 mg a day.    Mild renal insufficiency with a baseline creatinine of 1.4 last September.  Normal sodium potassium at that time.    He has severe sleep apnea diagnosed 2019 but highly compliant with his CPAP.  No worsening daytime sleepiness or shortness of breath.  No palpitations or history of atrial fibrillation.    He does have bilateral plantar fasciitis.  He did get good orthotic shoes and wears good house shoes during the day which does help.  But he still has some burning type pain when he first gets up from sitting on the ball of his feet radiating to the toes.  It is a numbness sensation although he still has feeling in those toes.  Pain does improve as he walks.  No leg claudication.  No pain currently.    No history of sores or ulcers.    No back pain or radicular type pain.  No flare of his neck.    History of cervical spine stenosis.  September 2020 MRI with moderate to severe central canal stenosis, but patient does not feel his neck is flaring.    Patient has been trying to lose weight but is only lost 3 pounds.  He is trying to reduce carbohydrates although still has some soup with potatoes.    He is trying to walk more regularly.    Never smoked.  No new cough or shortness of breath.    History of left lower extremity DVT and pulmonary embolism with recurrence in 2018 and now on chronic Xarelto.  No bleeding issues.    No chest pain or chest pressure.    His glaucoma is controlled and has an appointment next month with ophthalmology.    October 2021  "colonoscopy with 1 polyp, 5-year follow-up plan.    No recurrent gout after an episode with eating hotdogs and shrimp back in March of last year.  Uric acid was 8.3 last September.  Has not been on allopurinol.  He has not had previous recurrent gout.    September 2020  and HDL 43, no plans for statin.    PMHx:    Past Medical History:   Diagnosis Date     DVT (deep vein thrombosis) in pregnancy      High cholesterol      History of pulmonary embolus (PE)      Hypertension      Leg swelling      Numbness and tingling      Sarcoidosis      PSHx:    Past Surgical History:   Procedure Laterality Date     CATARACT EXTRACTION Left 2006     CATARACT EXTRACTION Right 2004     CYSTECTOMY  1973    Testicular cystectomy     IR PULMONARY ANGIOGRAM BILATERAL  1/2/2018     IR THROMBOLYTICS -ARTERIAL INITIAL DAY  1/1/2018     RETINAL DETACHMENT SURGERY Left 2005     RETINAL DETACHMENT SURGERY Right 2004     Immunizations:   Immunization History   Administered Date(s) Administered     COVID-19,PF,Pfizer (12+ Yrs) 04/03/2021, 04/24/2021, 11/21/2021     TD (ADULT, 7+) 01/01/1999     Tdap (Adacel,Boostrix) 08/13/2013       ROS A comprehensive review of systems was performed and was otherwise negative    Medications, allergies, and problem list were reviewed and updated    Exam  /72 (BP Location: Right arm, Patient Position: Sitting, Cuff Size: Adult Large)   Pulse 96   Ht 1.759 m (5' 9.25\")   Wt 116.6 kg (257 lb)   SpO2 95%   BMI 37.68 kg/m    Lungs are clear.  Heart is regular without murmur.  No ankle edema except for some trace edema in the left which is chronic.  His feet were examined and appear normal.  There is no tenderness to palpation.  There is no preulcerative callus.  I did not feel neuroma on the ball of the foot.  Toenails are normal.  Good circulation.    Assessment/Plan  1. Essential hypertension  Controlled.  Continue current chlorthalidone and spironolactone.  Mild chronic renal insufficiency.  " Checking labs today.    2. Plantar fasciitis  Chronic.  Some improvement with orthotic shoes.  He was offered referral to podiatry but he declined.    Possible mild peripheral neuropathy symptoms of his toes, associated with history of sleep apnea.  He was offered an EMG but he declined.    I discussed a tennis ball massage for the bottom of his foot while he sitting.    3. History of colonic polyps  5-year colonoscopy follow-up will be due October 2026    4. History of pulmonary embolism  Chronic Xarelto    5. Medication monitoring encounter      6. Obstructive sleep apnea on CPAP  Highly compliant with CPAP.  Severe sleep apnea.  I counseled him to lose weight, reduce carbohydrates.    7. Encounter for therapeutic drug monitoring    - Basic metabolic panel    History of cervical canal stenosis but he denies any flare of his neck pain.    Glaucoma, point with ophthalmology next month.    History of gout but no recurrence.  Patient was counseled to stay away from processed meats and screen      Return in about 6 months (around 9/8/2022) for Routine preventive.   Patient Instructions   No change in medication.    Your foot symptoms are suggestive of plantar fasciitis, possibly with some mild peripheral neuropathy.  Would recommend tennis ball massaging on the ball of your foot on a regular basis to help reduce the tightness and pain in your feet.  Continue to wear your orthotic shoes at all times.    Continue to work on weight loss and reducing carbohydrate and starchy foods in diet.    You can see a podiatrist for further discussion on your foot symptoms, if they worsen in the future.    Follow-up this fall for your physical exam.    Meng Barboza MD, MD        Current Outpatient Medications   Medication Sig Dispense Refill     bromfenac (PROLENSA) 0.07 % Drop [BROMFENAC (PROLENSA) 0.07 % DROP]        chlorthalidone (HYGROTON) 25 MG tablet Take 0.5 tablets (12.5 mg) by mouth daily 45 tablet 2     latanoprost  (XALATAN) 0.005 % ophthalmic solution [LATANOPROST (XALATAN) 0.005 % OPHTHALMIC SOLUTION] USE ONE DROP IN OU D  4     rivaroxaban ANTICOAGULANT (XARELTO) 20 mg tablet [RIVAROXABAN ANTICOAGULANT (XARELTO) 20 MG TABLET] Take 1 tablet (20 mg total) by mouth every evening. 90 tablet 3     spironolactone (ALDACTONE) 25 MG tablet Take 1 tablet (25 mg) by mouth daily 90 tablet 3     vitamin D3 (VITAMIN D3) 50 mcg (2000 units) tablet Take 1 tablet by mouth daily       No Known Allergies  Social History     Tobacco Use     Smoking status: Never Smoker     Smokeless tobacco: Never Used   Substance Use Topics     Alcohol use: Yes     Comment: Alcoholic Drinks/day: occ beer     Drug use: No           Answers for HPI/ROS submitted by the patient on 3/8/2022  How many servings of fruits and vegetables do you eat daily?: 0-1  On average, how many sweetened beverages do you drink each day (Examples: soda, juice, sweet tea, etc.  Do NOT count diet or artificially sweetened beverages)?: 1  How many minutes a day do you exercise enough to make your heart beat faster?: 9 or less  How many days a week do you exercise enough to make your heart beat faster?: 3 or less  How many days per week do you miss taking your medication?: 0  What is the reason for your visit today?: Check up

## 2022-03-08 NOTE — PROGRESS NOTES
Answers for HPI/ROS submitted by the patient on 3/8/2022  How many servings of fruits and vegetables do you eat daily?: 0-1  On average, how many sweetened beverages do you drink each day (Examples: soda, juice, sweet tea, etc.  Do NOT count diet or artificially sweetened beverages)?: 1  How many minutes a day do you exercise enough to make your heart beat faster?: 9 or less  How many days a week do you exercise enough to make your heart beat faster?: 3 or less  How many days per week do you miss taking your medication?: 0  What is the reason for your visit today?: Check up

## 2022-03-17 ENCOUNTER — MYC MEDICAL ADVICE (OUTPATIENT)
Dept: INTERNAL MEDICINE | Facility: CLINIC | Age: 67
End: 2022-03-17
Payer: COMMERCIAL

## 2022-03-17 DIAGNOSIS — M72.2 PLANTAR FASCIITIS: Primary | ICD-10-CM

## 2022-03-17 NOTE — TELEPHONE ENCOUNTER
Ok for podiatry referral?    Last OV 3/8/22    He does have bilateral plantar fasciitis.  He did get good orthotic shoes and wears good house shoes during the day which does help.  But he still has some burning type pain when he first gets up from sitting on the ball of his feet radiating to the toes.  It is a numbness sensation although he still has feeling in those toes.  Pain does improve as he walks.  No leg claudication.  No pain currently.    2. Plantar fasciitis  Chronic.  Some improvement with orthotic shoes.  He was offered referral to podiatry but he declined.     Possible mild peripheral neuropathy symptoms of his toes, associated with history of sleep apnea.  He was offered an EMG but he declined.     I discussed a tennis ball massage for the bottom of his foot while he sitting.

## 2022-03-30 ENCOUNTER — OFFICE VISIT (OUTPATIENT)
Dept: PODIATRY | Facility: CLINIC | Age: 67
End: 2022-03-30
Attending: INTERNAL MEDICINE
Payer: COMMERCIAL

## 2022-03-30 VITALS — BODY MASS INDEX: 35.79 KG/M2 | HEART RATE: 100 BPM | OXYGEN SATURATION: 97 % | HEIGHT: 70 IN | WEIGHT: 250 LBS

## 2022-03-30 DIAGNOSIS — M77.42 METATARSALGIA OF BOTH FEET: ICD-10-CM

## 2022-03-30 DIAGNOSIS — Q66.70 CAVUS DEFORMITY OF FOOT: Primary | ICD-10-CM

## 2022-03-30 DIAGNOSIS — M77.41 METATARSALGIA OF BOTH FEET: ICD-10-CM

## 2022-03-30 PROCEDURE — 99203 OFFICE O/P NEW LOW 30 MIN: CPT | Performed by: PODIATRIST

## 2022-03-30 ASSESSMENT — PAIN SCALES - GENERAL: PAINLEVEL: MILD PAIN (2)

## 2022-03-30 NOTE — PATIENT INSTRUCTIONS
What are Prescription Custom Orthotics?  Custom orthotics are specially-made devices designed to support and comfort your feet. Prescription orthotics are crafted for you and no one else. They match the contours of your feet precisely and are designed for the way you move. Orthotics are only manufactured after a podiatrist has conducted a complete evaluation of your feet, ankles, and legs, so the orthotic can accommodate your unique foot structure and pathology.  Prescription orthotics are divided into two categories:    Functional orthotics are designed to control abnormal motion. They may be used to treat foot pain caused by abnormal motion; they can also be used to treat injuries such as shin splints or tendinitis. Functional orthotics are usually crafted of a semi-rigid material such as plastic or graphite.    Accommodative orthotics are softer and meant to provide additional cushioning and support. They can be used to treat diabetic foot ulcers, painful calluses on the bottom of the foot, and other uncomfortable conditions.  Podiatrists use orthotics to treat foot problems such as plantar fasciitis, bursitis, tendinitis, diabetic foot ulcers, and foot, ankle, and heel pain. Clinical research studies have shown that podiatrist-prescribed foot orthotics decrease foot pain and improve function.  Orthotics typically cost more than shoe inserts purchased in a retail store, but the additional cost is usually well worth it. Unlike shoe inserts, orthotics are molded to fit each individual foot, so you can be sure that your orthotics fit and do what they're supposed to do. Prescription orthotics are also made of top-notch materials and last many years when cared for properly. Insurance often helps pay for prescription orthotics.  What are Shoe Inserts?   You've seen them at the grocery store and at the mall. You've probably even seen them on TV and online. Shoe inserts are any kind of non-prescription foot support  designed to be worn inside a shoe. Pre-packaged, mass produced, arch supports are shoe inserts. So are the  custom-made  insoles and foot supports that you can order online or at retail stores. Unless the device has been prescribed by a doctor and crafted for your specific foot, it's a shoe insert, not a custom orthotic device--despite what the ads might say.  Shoe inserts can be very helpful for a variety of foot ailments, including flat arches and foot and leg pain. They can cushion your feet, provide comfort, and support your arches, but they can't correct biomechanical foot problems or cure long-standing foot issues.  The most common types of shoe inserts are:    Arch supports: Some people have high arches. Others have low arches or flat feet. Arch supports generally have a  bumped-up  appearance and are designed to support the foot's natural arch.     Insoles: Insoles slip into your shoe to provide extra cushioning and support. Insoles are often made of gel, foam, or plastic.     Heel liners: Heel liners, sometimes called heel pads or heel cups, provide extra cushioning in the heel region. They may be especially useful for patients who have foot pain caused by age-related thinning of the heels' natural fat pads.     Foot cushions: Do your shoes rub against your heel or your toes? Foot cushions come in many different shapes and sizes and can be used as a barrier between you and your shoe.  Choosing an Over-the-Counter Shoe Insert  Selecting a shoe insert from the wide variety of devices on the market can be overwhelming. Here are some podiatrist-tested tips to help you find the insert that best meets your needs:    Consider your health. Do you have diabetes? Problems with circulation? An over-the-counter insert may not be your best bet. Diabetes and poor circulation increase your risk of foot ulcers and infections, so schedule an appointment with a podiatrist. He or she can help you select a solution that won't  cause additional health problems.     Think about the purpose. Are you planning to run a marathon, or do you just need a little arch support in your work shoes? Look for a product that fits your planned level of activity.     Bring your shoes. For the insert to be effective, it has to fit into your shoes. So bring your sneakers, dress shoes, or work boots--whatever you plan to wear with your insert. Look for an insert that will fit the contours of your shoe.     Try them on. If all possible, slip the insert into your shoe and try it out. Walk around a little. How does it feel? Don't assume that feelings of pressure will go away with continued wear. (If you can't try the inserts at the store, ask about the store's return policy and hold on to your receipt.)    Please call one of the [facility] locations below to schedule an appointment. If you received a prescription please bring it with you to your appointment. Some locations are limited to what they carry.    Office Locations    [facility]  [address] [place] [address]  Home Medical Equipment, [address]   Phone: [phone]   Orthotics and Prosthetics, [address]   Phone: [phone]    [facility]  [address]   [place] [address]   Phone: [phone]    [facility]  [address]  [place] [address]  Phone: [phone]    [facility]   [address]  [place] [address]  Phone: [phone]    [facility]  [address]  [place] [address]  Phone: [phone]    [facility]  [address]  [address]  [place] [address]  Phone: [phone]    [place]   [address] [place] [address]   Phone: [phone]    WEARING YOUR CUSTOM  FOOT ORTHOTICS   Most insurance plans cover one pair of orthotics per year. You must check with your   insurance plan to see what your payment responsibility will be. Please call your   insurance company by calling the number on the back of your insurance card.   Orthotic's are non-refundable and non-returnable.   Orthotics are made of various designs. Some orthotics are covered with material that extends beyond your toes. If your orthotic is of this design, you will likely need to trim the toe end to get a proper fit. The insole from your shoe can be used as a template. Simply overlay the shoe insert on top of the custom orthotic. Align the heel end while tracing the length of the insert onto the custom orthotic. Use a large scissor to trim the toe end until you get a proper fit in the shoe.   The orthotic needs to be pushed as far back in the shoe as possible. The heel portion should not ride forward so as not to irritate your heel.   Orthotics are designed to work with socks. Excessive perspiration will shorten the life span of the orthotics. Remove the orthotic from the shoe frequently for proper drying.   The break-in period lasts for weeks. People new to orthotics will likely experience new aches and pains. The orthotic is forcing your foot into a new position. Arch, foot and leg muscle aches and fatigue are common during these weeks. Minor discomfort can be considered normal break in phenomenon. Start wearing your orthotic around your home your first day. Limited activity for one to two hours is recommended. You can increase one or two additional hours each day provided the aches and pains are subsiding. The degree of discomfort, fatigue and problems will dictate the speed of break in. You may require multiple weeks to work up to full time use.   Do not continue wearing your orthotics if they are creating problems such as blisters or sores. Do not hesitate to call the clinic to speak with a nurse regarding  orthotic   break in, fit, trimming, etc. You may also need to see the doctor if the orthotics are   simply not working out. Adjustments are sometimes made to improve orthotic   function.     Orthotics will only work in certain styles and types of shoes. Orthotics rarely work in dress shoes. Slip-ons, clogs, sandals and heels are particularly troublesome. Specially designed orthotics may be necessary for these types of shoes. Your custom orthotic was designed for activities that require appropriate walking or running shoes. Lace up athletic shoes, walking shoes or work boots should work appropriately. You may need a wider or longer shoe. Shoes with a removable  or insert work best. In general, you want to remove an insert from the shoe before placing the orthotic into the shoe. Shoes without a removable liner may not work as well.     When purchasing new shoes, bring your orthotics along to get a proper fit. Shop at stores that are familiar with orthotics.   Frequent washing of the orthotic may shorten the life span of the top cover. The top cover can be replaced but will generally last one to five years depending on use and foot perspiration.

## 2022-03-30 NOTE — LETTER
3/30/2022         RE: Rui Lopez  [address] [place] [address]        Dear Colleague,    Thank you for referring your patient, Rui Lopez, to the [facility]. Please see a copy of my visit note below.    FOOT AND ANKLE SURGERY/PODIATRY CONSULT NOTE         ASSESSMENT:   Cavus foot deformity  Metatarsalgia      TREATMENT:  I have recommended orthotics with a metatarsal raise.  The patient is to return to the clinic as needed.        HPI: I was asked to see Rui Lopez today to evaluate and treat bilateral foot pain.  The patient indicated for the past several months he has had pain on the ball of both feet.  The pain is aggravated with weightbearing and ambulation.  He stated that the pain is more noticeable if he stands in 1 place for for any length of time.  It is an aching type pain which can extend to the toes.  He has not had any numbness, tingling or burning or shooting pain.  He denies any recent trauma to his feet.  He has not had any associated redness or swelling.  His pain is moderate to severe.  He has no pain while resting.  He denies any other previous treatment.  He has attempted to modify his shoe gear and he has purchased an over-the-counter shoe insert which has given him some relief.  The patient was seen in consultation at the request of Nestor Leonardo MD for evaluation and treatment of bilateral foot pain.     Past Medical History:   Diagnosis Date     DVT (deep vein thrombosis) in pregnancy      High cholesterol      History of pulmonary embolus (PE)      Hypertension      Leg swelling      Numbness and tingling      Sarcoidosis        Social History     Socioeconomic History     Marital status:      Spouse name: Not on file     Number of children: Not on file     Years of education: Not on file     Highest education level: Not on file   Occupational History     Not on file   Tobacco Use     Smoking status: Never Smoker     Smokeless  tobacco: Never Used   Substance and Sexual Activity     Alcohol use: Yes     Comment: Alcoholic Drinks/day: occ beer     Drug use: No     Sexual activity: Not on file   Other Topics Concern     Not on file   Social History Narrative     Not on file     Social Determinants of Health     Financial Resource Strain: Not on file   Food Insecurity: Not on file   Transportation Needs: Not on file   Physical Activity: Not on file   Stress: Not on file   Social Connections: Not on file   Intimate Partner Violence: Not on file   Housing Stability: Not on file        No Known Allergies       Current Outpatient Medications:      bromfenac (PROLENSA) 0.07 % Drop, [BROMFENAC (PROLENSA) 0.07 % DROP] , Disp: , Rfl:      chlorthalidone (HYGROTON) 25 MG tablet, Take 0.5 tablets (12.5 mg) by mouth daily, Disp: 45 tablet, Rfl: 2     latanoprost (XALATAN) 0.005 % ophthalmic solution, [LATANOPROST (XALATAN) 0.005 % OPHTHALMIC SOLUTION] USE ONE DROP IN OU D, Disp: , Rfl: 4     rivaroxaban ANTICOAGULANT (XARELTO) 20 mg tablet, [RIVAROXABAN ANTICOAGULANT (XARELTO) 20 MG TABLET] Take 1 tablet (20 mg total) by mouth every evening., Disp: 90 tablet, Rfl: 3     spironolactone (ALDACTONE) 25 MG tablet, Take 1 tablet (25 mg) by mouth daily, Disp: 90 tablet, Rfl: 3     vitamin D3 (VITAMIN D3) 50 mcg (2000 units) tablet, Take 1 tablet by mouth daily, Disp: , Rfl:      Family History   Problem Relation Age of Onset     Hypothyroidism Mother      Leukemia Mother      Arthritis Mother      Hypertension Father      Aortic aneurysm Father         AAA     Colon Polyps Father      Kidney Cancer Maternal Grandfather         Social History     Socioeconomic History     Marital status:      Spouse name: Not on file     Number of children: Not on file     Years of education: Not on file     Highest education level: Not on file   Occupational History     Not on file   Tobacco Use     Smoking status: Never Smoker     Smokeless tobacco: Never Used  "  Substance and Sexual Activity     Alcohol use: Yes     Comment: Alcoholic Drinks/day: occ beer     Drug use: No     Sexual activity: Not on file   Other Topics Concern     Not on file   Social History Narrative     Not on file     Social Determinants of Health     Financial Resource Strain: Not on file   Food Insecurity: Not on file   Transportation Needs: Not on file   Physical Activity: Not on file   Stress: Not on file   Social Connections: Not on file   Intimate Partner Violence: Not on file   Housing Stability: Not on file        Review of Systems - Patient denies fever, chills, rash, wound, stiffness,, numbness, weakness, heart burn, blood in stool, chest pain with activity, calf pain when walking, shortness of breath with activity, chronic cough, easy bleeding/bruising, swelling of ankles, excessive thirst, fatigue, depression, anxiety.  Patient admits to bilateral foot pain.      OBJECTIVE:  Appearance: alert, well appearing, and in no distress.    Pulse 100   Ht 1.772 m (5' 9.75\")   Wt 113.4 kg (250 lb)   SpO2 97%   BMI 36.13 kg/m       Body mass index is 36.13 kg/m .     General appearance: Patient is alert and fully cooperative with history & exam.  No sign of distress is noted during the visit.  Psychiatric: Affect is pleasant & appropriate.  Patient appears motivated to improve health.  Respiratory: Breathing is regular & unlabored while sitting.  HEENT: Hearing is intact to spoken word.  Speech is clear.  No gross evidence of visual impairment that would impact ambulation.    Vascular: Dorsalis pedis and posterior tibial pulses are palpable. There is no pedal hair growth bilaterally.  CFT < 3 sec from anterior tibial surface to distal digits bilaterally. There is no appreciable edema noted.  Dermatologic: Turgor and texture are within normal limits. No coloration or temperature changes. No primary or secondary lesions noted.  Neurologic: All epicritic and proprioceptive sensations are grossly " intact bilaterally.  Musculoskeletal: All active and passive ankle, subtalar, midtarsal, and 1st MPJ range of motion are grossly intact without pain or crepitus, with the exception of none. Manual muscle strength is within normal limits bilaterally. All dorsiflexors, plantarflexors, invertors, evertors are intact bilaterally. Tenderness present to metatarsal heads bilaterally on palpation.  No tenderness to bilateral feet or ankles with range of motion. Calf is soft/non-tender without warmth/induration    Imaging:       No images are attached to the encounter or orders placed in the encounter.     No results found.   No results found.       Dane Monroe DPM  [facility]         Again, thank you for allowing me to participate in the care of your patient.        Sincerely,        Dane Ellison DPM

## 2022-03-30 NOTE — PROGRESS NOTES
FOOT AND ANKLE SURGERY/PODIATRY CONSULT NOTE         ASSESSMENT:   Cavus foot deformity  Metatarsalgia      TREATMENT:  I have recommended orthotics with a metatarsal raise.  The patient is to return to the clinic as needed.        HPI: I was asked to see Rui Lopez today to evaluate and treat bilateral foot pain.  The patient indicated for the past several months he has had pain on the ball of both feet.  The pain is aggravated with weightbearing and ambulation.  He stated that the pain is more noticeable if he stands in 1 place for for any length of time.  It is an aching type pain which can extend to the toes.  He has not had any numbness, tingling or burning or shooting pain.  He denies any recent trauma to his feet.  He has not had any associated redness or swelling.  His pain is moderate to severe.  He has no pain while resting.  He denies any other previous treatment.  He has attempted to modify his shoe gear and he has purchased an over-the-counter shoe insert which has given him some relief.  The patient was seen in consultation at the request of Nestor Leonardo MD for evaluation and treatment of bilateral foot pain.     Past Medical History:   Diagnosis Date     DVT (deep vein thrombosis) in pregnancy      High cholesterol      History of pulmonary embolus (PE)      Hypertension      Leg swelling      Numbness and tingling      Sarcoidosis        Social History     Socioeconomic History     Marital status:      Spouse name: Not on file     Number of children: Not on file     Years of education: Not on file     Highest education level: Not on file   Occupational History     Not on file   Tobacco Use     Smoking status: Never Smoker     Smokeless tobacco: Never Used   Substance and Sexual Activity     Alcohol use: Yes     Comment: Alcoholic Drinks/day: occ beer     Drug use: No     Sexual activity: Not on file   Other Topics Concern     Not on file   Social History Narrative     Not on file     Social  Determinants of Health     Financial Resource Strain: Not on file   Food Insecurity: Not on file   Transportation Needs: Not on file   Physical Activity: Not on file   Stress: Not on file   Social Connections: Not on file   Intimate Partner Violence: Not on file   Housing Stability: Not on file        No Known Allergies       Current Outpatient Medications:      bromfenac (PROLENSA) 0.07 % Drop, [BROMFENAC (PROLENSA) 0.07 % DROP] , Disp: , Rfl:      chlorthalidone (HYGROTON) 25 MG tablet, Take 0.5 tablets (12.5 mg) by mouth daily, Disp: 45 tablet, Rfl: 2     latanoprost (XALATAN) 0.005 % ophthalmic solution, [LATANOPROST (XALATAN) 0.005 % OPHTHALMIC SOLUTION] USE ONE DROP IN OU D, Disp: , Rfl: 4     rivaroxaban ANTICOAGULANT (XARELTO) 20 mg tablet, [RIVAROXABAN ANTICOAGULANT (XARELTO) 20 MG TABLET] Take 1 tablet (20 mg total) by mouth every evening., Disp: 90 tablet, Rfl: 3     spironolactone (ALDACTONE) 25 MG tablet, Take 1 tablet (25 mg) by mouth daily, Disp: 90 tablet, Rfl: 3     vitamin D3 (VITAMIN D3) 50 mcg (2000 units) tablet, Take 1 tablet by mouth daily, Disp: , Rfl:      Family History   Problem Relation Age of Onset     Hypothyroidism Mother      Leukemia Mother      Arthritis Mother      Hypertension Father      Aortic aneurysm Father         AAA     Colon Polyps Father      Kidney Cancer Maternal Grandfather         Social History     Socioeconomic History     Marital status:      Spouse name: Not on file     Number of children: Not on file     Years of education: Not on file     Highest education level: Not on file   Occupational History     Not on file   Tobacco Use     Smoking status: Never Smoker     Smokeless tobacco: Never Used   Substance and Sexual Activity     Alcohol use: Yes     Comment: Alcoholic Drinks/day: occ beer     Drug use: No     Sexual activity: Not on file   Other Topics Concern     Not on file   Social History Narrative     Not on file     Social Determinants of Health  "    Financial Resource Strain: Not on file   Food Insecurity: Not on file   Transportation Needs: Not on file   Physical Activity: Not on file   Stress: Not on file   Social Connections: Not on file   Intimate Partner Violence: Not on file   Housing Stability: Not on file        Review of Systems - Patient denies fever, chills, rash, wound, stiffness,, numbness, weakness, heart burn, blood in stool, chest pain with activity, calf pain when walking, shortness of breath with activity, chronic cough, easy bleeding/bruising, swelling of ankles, excessive thirst, fatigue, depression, anxiety.  Patient admits to bilateral foot pain.      OBJECTIVE:  Appearance: alert, well appearing, and in no distress.    Pulse 100   Ht 1.772 m (5' 9.75\")   Wt 113.4 kg (250 lb)   SpO2 97%   BMI 36.13 kg/m       Body mass index is 36.13 kg/m .     General appearance: Patient is alert and fully cooperative with history & exam.  No sign of distress is noted during the visit.  Psychiatric: Affect is pleasant & appropriate.  Patient appears motivated to improve health.  Respiratory: Breathing is regular & unlabored while sitting.  HEENT: Hearing is intact to spoken word.  Speech is clear.  No gross evidence of visual impairment that would impact ambulation.    Vascular: Dorsalis pedis and posterior tibial pulses are palpable. There is no pedal hair growth bilaterally.  CFT < 3 sec from anterior tibial surface to distal digits bilaterally. There is no appreciable edema noted.  Dermatologic: Turgor and texture are within normal limits. No coloration or temperature changes. No primary or secondary lesions noted.  Neurologic: All epicritic and proprioceptive sensations are grossly intact bilaterally.  Musculoskeletal: All active and passive ankle, subtalar, midtarsal, and 1st MPJ range of motion are grossly intact without pain or crepitus, with the exception of none. Manual muscle strength is within normal limits bilaterally. All " dorsiflexors, plantarflexors, invertors, evertors are intact bilaterally. Tenderness present to metatarsal heads bilaterally on palpation.  No tenderness to bilateral feet or ankles with range of motion. Calf is soft/non-tender without warmth/induration    Imaging:       No images are attached to the encounter or orders placed in the encounter.     No results found.   No results found.       Dane Monroe DPM  [facility]/Podiatry

## 2022-05-03 DIAGNOSIS — I10 ESSENTIAL HYPERTENSION: ICD-10-CM

## 2022-05-06 RX ORDER — CHLORTHALIDONE 25 MG/1
TABLET ORAL
Qty: 45 TABLET | Refills: 2 | Status: SHIPPED | OUTPATIENT
Start: 2022-05-06 | End: 2022-09-09

## 2022-05-17 DIAGNOSIS — I26.99 PULMONARY EMBOLISM WITH INFARCTION (H): ICD-10-CM

## 2022-05-18 NOTE — TELEPHONE ENCOUNTER
Routing refill request to provider for review/approval because:  Labs not current:  CC  Anticoagulation protocol - route to provider.    Last Written Prescription Date:  5/19/21  Last Fill Quantity: 90,  # refills: 3   Last office visit provider:  3/8/21     Requested Prescriptions   Pending Prescriptions Disp Refills     rivaroxaban ANTICOAGULANT (XARELTO) 20 MG TABS tablet 90 tablet 3     Sig: Take 1 tablet (20 mg) by mouth every evening       Direct Oral Anticoagulant Agents Failed - 5/18/2022 10:41 AM        Failed - Creatinine Clearance greater than 50 ml/min on file in past 3 mos     No lab results found.          Passed - Normal Platelets on file in past 12 months     Recent Labs   Lab Test 09/08/21  1603                  Passed - Medication is active on med list        Passed - Serum creatinine less than or equal to 1.4 on file in past 3 mos     Recent Labs   Lab Test 03/08/22  1133   CR 1.37*       Ok to refill medication if creatinine is low          Passed - Patient is 18 years of age or older        Passed - Recent (6 mo) or future (30 days) visit within the authorizing provider's specialty             Yordy Holliday RN 05/18/22 10:42 AM

## 2022-09-09 ENCOUNTER — OFFICE VISIT (OUTPATIENT)
Dept: INTERNAL MEDICINE | Facility: CLINIC | Age: 67
End: 2022-09-09
Payer: COMMERCIAL

## 2022-09-09 VITALS
BODY MASS INDEX: 34.88 KG/M2 | DIASTOLIC BLOOD PRESSURE: 72 MMHG | HEIGHT: 69 IN | SYSTOLIC BLOOD PRESSURE: 118 MMHG | HEART RATE: 68 BPM | WEIGHT: 235.5 LBS

## 2022-09-09 DIAGNOSIS — Z51.81 ENCOUNTER FOR THERAPEUTIC DRUG MONITORING: ICD-10-CM

## 2022-09-09 DIAGNOSIS — Z11.4 SCREENING FOR HIV (HUMAN IMMUNODEFICIENCY VIRUS): ICD-10-CM

## 2022-09-09 DIAGNOSIS — N18.2 CHRONIC RENAL INSUFFICIENCY, STAGE 2 (MILD): ICD-10-CM

## 2022-09-09 DIAGNOSIS — Z11.59 NEED FOR HEPATITIS C SCREENING TEST: ICD-10-CM

## 2022-09-09 DIAGNOSIS — Z23 NEED FOR VACCINATION AGAINST STREPTOCOCCUS PNEUMONIAE: ICD-10-CM

## 2022-09-09 DIAGNOSIS — Z00.00 ENCOUNTER FOR WELLNESS EXAMINATION IN ADULT: Primary | ICD-10-CM

## 2022-09-09 DIAGNOSIS — I10 ESSENTIAL HYPERTENSION: ICD-10-CM

## 2022-09-09 DIAGNOSIS — Z12.5 SCREENING FOR PROSTATE CANCER: ICD-10-CM

## 2022-09-09 DIAGNOSIS — I26.99 PULMONARY EMBOLISM WITH INFARCTION (H): ICD-10-CM

## 2022-09-09 DIAGNOSIS — Z87.39 HISTORY OF GOUT: ICD-10-CM

## 2022-09-09 DIAGNOSIS — Z86.711 HISTORY OF PULMONARY EMBOLISM: ICD-10-CM

## 2022-09-09 LAB
ALBUMIN SERPL BCG-MCNC: 4.2 G/DL (ref 3.5–5.2)
ALP SERPL-CCNC: 79 U/L (ref 40–129)
ALT SERPL W P-5'-P-CCNC: 17 U/L (ref 10–50)
ANION GAP SERPL CALCULATED.3IONS-SCNC: 5 MMOL/L (ref 7–15)
AST SERPL W P-5'-P-CCNC: 24 U/L (ref 10–50)
BILIRUB SERPL-MCNC: 0.7 MG/DL
BUN SERPL-MCNC: 19.5 MG/DL (ref 8–23)
CALCIUM SERPL-MCNC: 9.4 MG/DL (ref 8.8–10.2)
CHLORIDE SERPL-SCNC: 99 MMOL/L (ref 98–107)
CHOLEST SERPL-MCNC: 202 MG/DL
CREAT SERPL-MCNC: 1.29 MG/DL (ref 0.67–1.17)
DEPRECATED HCO3 PLAS-SCNC: 33 MMOL/L (ref 22–29)
ERYTHROCYTE [DISTWIDTH] IN BLOOD BY AUTOMATED COUNT: 12.2 % (ref 10–15)
GFR SERPL CREATININE-BSD FRML MDRD: 62 ML/MIN/1.73M2
GLUCOSE SERPL-MCNC: 108 MG/DL (ref 70–99)
HCT VFR BLD AUTO: 50.6 % (ref 40–53)
HDLC SERPL-MCNC: 42 MG/DL
HGB BLD-MCNC: 17.4 G/DL (ref 13.3–17.7)
LDLC SERPL CALC-MCNC: 111 MG/DL
MCH RBC QN AUTO: 30.6 PG (ref 26.5–33)
MCHC RBC AUTO-ENTMCNC: 34.4 G/DL (ref 31.5–36.5)
MCV RBC AUTO: 89 FL (ref 78–100)
NONHDLC SERPL-MCNC: 160 MG/DL
PLATELET # BLD AUTO: 172 10E3/UL (ref 150–450)
POTASSIUM SERPL-SCNC: 4.1 MMOL/L (ref 3.4–5.3)
PROT SERPL-MCNC: 6.6 G/DL (ref 6.4–8.3)
PSA SERPL-MCNC: 0.38 NG/ML (ref 0–4.5)
RBC # BLD AUTO: 5.68 10E6/UL (ref 4.4–5.9)
SODIUM SERPL-SCNC: 137 MMOL/L (ref 136–145)
TRIGL SERPL-MCNC: 245 MG/DL
URATE SERPL-MCNC: 7.9 MG/DL (ref 3.4–7)
WBC # BLD AUTO: 8.1 10E3/UL (ref 4–11)

## 2022-09-09 PROCEDURE — 80053 COMPREHEN METABOLIC PANEL: CPT | Performed by: INTERNAL MEDICINE

## 2022-09-09 PROCEDURE — 36415 COLL VENOUS BLD VENIPUNCTURE: CPT | Performed by: INTERNAL MEDICINE

## 2022-09-09 PROCEDURE — 99214 OFFICE O/P EST MOD 30 MIN: CPT | Mod: 25 | Performed by: INTERNAL MEDICINE

## 2022-09-09 PROCEDURE — 85027 COMPLETE CBC AUTOMATED: CPT | Performed by: INTERNAL MEDICINE

## 2022-09-09 PROCEDURE — G0103 PSA SCREENING: HCPCS | Performed by: INTERNAL MEDICINE

## 2022-09-09 PROCEDURE — 84550 ASSAY OF BLOOD/URIC ACID: CPT | Performed by: INTERNAL MEDICINE

## 2022-09-09 PROCEDURE — G0402 INITIAL PREVENTIVE EXAM: HCPCS | Performed by: INTERNAL MEDICINE

## 2022-09-09 PROCEDURE — 80061 LIPID PANEL: CPT | Performed by: INTERNAL MEDICINE

## 2022-09-09 RX ORDER — SPIRONOLACTONE 25 MG/1
25 TABLET ORAL DAILY
Qty: 90 TABLET | Refills: 3 | Status: SHIPPED | OUTPATIENT
Start: 2022-09-09 | End: 2023-09-11

## 2022-09-09 RX ORDER — CHLORTHALIDONE 25 MG/1
TABLET ORAL
Qty: 45 TABLET | Refills: 3 | Status: SHIPPED | OUTPATIENT
Start: 2022-09-09 | End: 2023-09-11

## 2022-09-09 ASSESSMENT — ENCOUNTER SYMPTOMS
CONSTIPATION: 0
PALPITATIONS: 0
MYALGIAS: 0
SHORTNESS OF BREATH: 0
DIARRHEA: 0
SORE THROAT: 0
HEMATOCHEZIA: 0
ABDOMINAL PAIN: 0
DYSURIA: 0
HEARTBURN: 0
DIZZINESS: 0
FREQUENCY: 0
FEVER: 0
ARTHRALGIAS: 1
EYE PAIN: 0
COUGH: 0
CHILLS: 0
HEMATURIA: 0
NAUSEA: 0
PARESTHESIAS: 0
NERVOUS/ANXIOUS: 0
HEADACHES: 0
WEAKNESS: 0

## 2022-09-09 ASSESSMENT — ACTIVITIES OF DAILY LIVING (ADL): CURRENT_FUNCTION: NO ASSISTANCE NEEDED

## 2022-09-09 NOTE — PROGRESS NOTES
SUBJECTIVE:   Rui Lopez is a 67 year old male who presents for Preventive Visit.  Lives independently.  Since he is retired he has been more active, walking and staying active in the garage, doing woodwork.  He avoids prolonged standing.  He does have some chronic lower extremity varicose veins.    He has lost 20 pounds.  Reduce carbohydrates in diet.    Feels his energy levels are improved.    No swallowing issues or abdominal pain or change in bowels.    He has had mild renal insufficiency with a creatinine of 1.4.  Hypertension has been controlled with blood pressures in the 120/70 range.  He denies any low blood pressures or orthostasis.    Chlorthalidone 12.5 mg a day and spironolactone 25 mg a day.    His cholesterol levels have been excellent and he has not been on a statin.  In 2020 .    No history of vascular disease.  He is never smoked.    He does have severe sleep apnea diagnosed in 2019 highly compliant with CPAP and denies significant daytime sleepiness.  No palpitations or atrial fibrillation.    No worsening shortness of breath or edema.    Mild peripheral neuropathy but no foot sores.  No falls.  His feet are feeling better after orthotics for the Planter fasciitis.    History of cervical stenosis moderate to severe on 2020 MRI but is not had any neck pain or arm radicular pain currently.    History of DVT and pulmonary embolism with recurrence in 2018 and now on long-term Xarelto.  No bleeding issues.  No new shortness of breath or chest pain.    Glaucoma well controlled, up-to-date on ophthalmology exams.    No headache complaints.    .  Colonoscopy October 2021 and a polyp, 5-year follow-up.    No urinary symptoms.    No recurrent gout since March 2021 when he was eating too many hotdogs and shrimp.  Uric acid level was 8.3 last year.  He is not on allopurinol.  He has improved his diet.    No new cough or shortness of breath.  No urinary complaints.    He confirmed full CODE STATUS  "wishes.    He denies any memory issues or depression issues.  Patient has been advised of split billing requirements and indicates understanding: Yes  Are you in the first 12 months of your Medicare coverage?  Yes,  Visual Acuity:  Right Eye: 20/50   Left Eye: 20/20  Both Eyes: 20/25    Healthy Habits:     In general, how would you rate your overall health?  Good    Frequency of exercise:  1 day/week    Duration of exercise:  Less than 15 minutes    Do you usually eat at least 4 servings of fruit and vegetables a day, include whole grains    & fiber and avoid regularly eating high fat or \"junk\" foods?  No    Taking medications regularly:  Yes    Medication side effects:  None    Ability to successfully perform activities of daily living:  No assistance needed    Home Safety:  No safety concerns identified    Hearing Impairment:  No hearing concerns    In the past 6 months, have you been bothered by leaking of urine?  No    In general, how would you rate your overall mental or emotional health?  Good      PHQ-2 Total Score: 0    Additional concerns today:  No    Do you feel safe in your environment? Yes    Have you ever done Advance Care Planning? (For example, a Health Directive, POLST, or a discussion with a medical provider or your loved ones about your wishes): No, advance care planning information given to patient to review.  Patient declined advance care planning discussion at this time.     Fall risk  Fallen 2 or more times in the past year?: No  Any fall with injury in the past year?: No  click delete button to remove this line now  Cognitive Screening   1) Repeat 3 items (Leader, Season, Table)    2) Clock draw: NORMAL  3) 3 item recall: Recalls 1 object   Results: NORMAL clock, 1-2 items recalled: COGNITIVE IMPAIRMENT LESS LIKELY    Mini-CogTM Copyright TAYLOR Hawthorne. Licensed by the author for use in [facility]; reprinted with permission (sadie@.[facility]). All rights reserved.        Reviewed and " updated as needed this visit by clinical staff   Tobacco  Allergies  Meds                Reviewed and updated as needed this visit by Provider     Meds               Social History     Tobacco Use     Smoking status: Never Smoker     Smokeless tobacco: Never Used   Substance Use Topics     Alcohol use: Yes     Comment: Alcoholic Drinks/day: occ beer       Alcohol Use 9/9/2022   Prescreen: >3 drinks/day or >7 drinks/week? No           Current providers sharing in care for this patient include:   Patient Care Team:  Meng Barboza MD as PCP - General (Internal Medicine)  Meng Barboza MD as Assigned PCP  Dane Ellison DPM as Assigned Musculoskeletal Provider  Sagar Parikh MD as MD    The following health maintenance items are reviewed in Epic and correct as of today:  Health Maintenance Due   Topic Date Due     DEPRESSION ACTION PLAN  Never done     HIV SCREENING  Never done     HEPATITIS C SCREENING  Never done     ZOSTER IMMUNIZATION (1 of 2) Never done     Pneumococcal Vaccine: 65+ Years (1 - PCV) Never done     INFLUENZA VACCINE (1) Never done     Lab work is in process  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)     Adenomatous colon polyp (04/2016)     Pulmonary emboli (H)     History of pulmonary embolism     History of colonic polyps     Medication monitoring encounter     Obesity (BMI 35.0-39.9) with comorbidity (H)     Past Surgical History:   Procedure Laterality Date     CATARACT EXTRACTION Left 2006     CATARACT EXTRACTION Right 2004     CYSTECTOMY  1973    Testicular cystectomy     IR PULMONARY ANGIOGRAM BILATERAL  1/2/2018     IR THROMBOLYTICS -ARTERIAL INITIAL DAY  1/1/2018     RETINAL DETACHMENT SURGERY Left 2005     RETINAL DETACHMENT SURGERY Right 2004       Social History     Tobacco Use     Smoking status: Never Smoker     Smokeless tobacco: Never Used   Substance Use Topics     Alcohol use: Yes  "    Comment: Alcoholic Drinks/day: occ beer     Family History   Problem Relation Age of Onset     Hypothyroidism Mother      Leukemia Mother      Arthritis Mother      Hypertension Father      Aortic aneurysm Father         AAA     Colon Polyps Father      Kidney Cancer Maternal Grandfather          Current Outpatient Medications   Medication Sig Dispense Refill     bromfenac (PROLENSA) 0.07 % Drop [BROMFENAC (PROLENSA) 0.07 % DROP]        chlorthalidone (HYGROTON) 25 MG tablet TAKE 1/2 TABLET(12.5 MG) BY MOUTH DAILY 45 tablet 3     latanoprost (XALATAN) 0.005 % ophthalmic solution [LATANOPROST (XALATAN) 0.005 % OPHTHALMIC SOLUTION] USE ONE DROP IN OU D  4     rivaroxaban ANTICOAGULANT (XARELTO) 20 MG TABS tablet Take 1 tablet (20 mg) by mouth every evening 90 tablet 3     spironolactone (ALDACTONE) 25 MG tablet Take 1 tablet (25 mg) by mouth daily 90 tablet 3     vitamin D3 (CHOLECALCIFEROL) 50 mcg (2000 units) tablet Take 1 tablet by mouth daily       No Known Allergies          Review of Systems   Constitutional: Negative for chills and fever.   HENT: Negative for congestion, ear pain, hearing loss and sore throat.    Eyes: Negative for pain and visual disturbance.   Respiratory: Negative for cough and shortness of breath.    Cardiovascular: Positive for peripheral edema. Negative for chest pain and palpitations.   Gastrointestinal: Negative for abdominal pain, constipation, diarrhea, heartburn, hematochezia and nausea.   Genitourinary: Negative for dysuria, frequency, genital sores, hematuria, impotence, penile discharge and urgency.   Musculoskeletal: Positive for arthralgias. Negative for myalgias.   Skin: Negative for rash.   Neurological: Negative for dizziness, weakness, headaches and paresthesias.   Psychiatric/Behavioral: Negative for mood changes. The patient is not nervous/anxious.      Remainder review of systems is negative.    OBJECTIVE:   Ht 1.765 m (5' 9.49\")   Wt 106.8 kg (235 lb 8 oz)   BMI " "34.29 kg/m   Estimated body mass index is 34.29 kg/m  as calculated from the following:    Height as of this encounter: 1.765 m (5' 9.49\").    Weight as of this encounter: 106.8 kg (235 lb 8 oz).  Physical Exam  Alert and oriented x3.  Normal cognition and interaction.  Normal mood and affect.  Normal mobility.  He did have some difficulty on 2 out of 3 words, but he states he was somewhat distracted and does not feel he has a memory issue.    Pupils irises equal and reactive.  Extraocular muscles intact.  No jaundice or conjunctivitis.  External ears and nose exam is normal with normal tympanic membranes, minimal cerumen.  No cervical or supraclavicular or axillary adenopathy.  No JVD and no carotid bruits.  No thyromegaly or nodularity.  Lungs are clear to auscultation with good respiratory excursion.  Heart is regular without murmur rub or gallop.  He does have trace ankle edema with moderate varicose veins, stable.  He is otherwise good condition without preulcerative calluses.  Abdomen is mildly overweight with umbilical hernia, reducible and nontender.  No pulsatile abdominal mass.  No hepatosplenomegaly and liver edge was just at the costal margin.  He declined  exam.  Skin exam without suspicious skin lesions.    ASSESSMENT / PLAN:   (Z00.00) Encounter for wellness examination in adult  (primary encounter diagnosis)  Comment: I have patient emphasized on continuing his regular exercise plan and weight loss plan.  We discussed his winter activity plan, he plans to join a gym.  I discussed getting 20 to 30 minutes of exercise 3-4 times a week.  Plan: He will get a flu shot and the new COVID-vaccine at the pharmacy where his son works.    Patient confirmed full CODE STATUS wishes.    History of colon polyp, 5-year colonoscopy will be due October 2026    (I10) Essential hypertension  Comment: Controlled.  Is lost significant weight.  Blood pressure lower, but adequately controlled at this time and denies " orthostasis.    Discussed monitoring blood pressure and if low blood pressures with further weight loss, consider reducing spironolactone  Plan: chlorthalidone (HYGROTON) 25 MG tablet,         spironolactone (ALDACTONE) 25 MG tablet, Lipid         Profile        Follow-up in 6 months if stable    Cholesterols been well controlled with goal LDL less than 130.  Not planning statin    (N18.2) Chronic renal insufficiency, stage 2 (mild)  Comment: As above.  Avoid NSAID use.  Plan:     (I26.99) Pulmonary embolism with infarction (H)  Comment: Long-term Xarelto  Plan:     (Z11.4) Screening for HIV (human immunodeficiency virus)  Comment: Not suspected, patient declined screening  Plan:     (Z11.59) Need for hepatitis C screening test  Comment: Not suspected, patient declined screening  Plan:     (Z86.711) History of pulmonary embolism  Comment: History of recurrent PE.  Long-term Xarelto  Plan: rivaroxaban ANTICOAGULANT (XARELTO) 20 MG TABS         tablet            (Z23) Need for vaccination against Streptococcus pneumoniae  Comment: Patient declined Pneumovax 20.  I did recommend this to patient and have him consider this in the future.  Plan:     (Z12.5) Screening for prostate cancer  Comment: No new urinary symptoms  Plan: Prostate Specific Antigen Screen        Continue yearly screening    (Z51.81) Encounter for therapeutic drug monitoring  Comment:   Plan: CBC with platelets, Comprehensive metabolic         panel            (Z87.39) History of gout  Comment: No recurrent gout with improvements in diet.  Plan: Uric acid          Glaucoma, managed by ophthalmology and up-to-date on follow-up visits with ophthalmology    History of cervical stenosis, no pain complaints now, is lost significant weight.    Plantar fasciitis also better, using orthotics.    Mild foot neuropathy associate with history of sleep apnea, not worsening.  No foot sores.        COUNSELING:  Reviewed preventive health counseling, as reflected in  "patient instructions       Regular exercise       Healthy diet/nutrition       Vision screening       Colon cancer screening       Prostate cancer screening    Estimated body mass index is 34.29 kg/m  as calculated from the following:    Height as of this encounter: 1.765 m (5' 9.49\").    Weight as of this encounter: 106.8 kg (235 lb 8 oz).    Weight management plan: Discussed healthy diet and exercise guidelines    He reports that he has never smoked. He has never used smokeless tobacco.      Appropriate preventive services were discussed with this patient, including applicable screening as appropriate for cardiovascular disease, diabetes, osteopenia/osteoporosis, and glaucoma.  As appropriate for age/gender, discussed screening for colorectal cancer, prostate cancer, breast cancer, and cervical cancer. Checklist reviewing preventive services available has been given to the patient.    Reviewed patients plan of care and provided an AVS. The Basic Care Plan (routine screening as documented in Health Maintenance) for Rui meets the Care Plan requirement. This Care Plan has been established and reviewed with the Patient.    Counseling Resources:  ATP IV Guidelines  Pooled Cohorts Equation Calculator  Breast Cancer Risk Calculator  Breast Cancer: Medication to Reduce Risk  FRAX Risk Assessment  ICSI Preventive Guidelines  Dietary Guidelines for Americans, 2010  USDA's MyPlate  ASA Prophylaxis  Lung CA Screening    Meng Barboza MD  [facility]    Identified Health Risks:  "

## 2022-09-09 NOTE — PATIENT INSTRUCTIONS
Get the new COVID booster as soon as possible.    Get the flu shot this fall.    You are due for a Pneumovax pneumonia vaccine.  This is a one-time lifetime shot.  You can discuss this with your son.    You need a regular exercise plan for the winter.  Get 20 to 30 minutes of aerobic type exercise and stretching at least 3-4 times a week.    Your colonoscopy is due October 2026.    If you are not getting blood pressures less than 110/60, getting more lightheaded dizzy spells, contact me to consider reduction in your blood pressure medication.    Otherwise follow-up in 6 months for routine blood pressure checkup appointment, you do not need to fast.

## 2022-09-18 ENCOUNTER — HEALTH MAINTENANCE LETTER (OUTPATIENT)
Age: 67
End: 2022-09-18

## 2022-12-29 ENCOUNTER — TELEPHONE (OUTPATIENT)
Dept: INTERNAL MEDICINE | Facility: CLINIC | Age: 67
End: 2022-12-29

## 2022-12-29 NOTE — TELEPHONE ENCOUNTER
I do not recommend any sedating pain medications, as that can worsen sleep apnea.  I recommend he not use any narcotics or tramadol.  Unfortunately patient should also not take ibuprofen or Aleve as he has mild kidney insufficiency and is on a blood thinner.    Unfortunate this limits his pain management to Tylenol.  Have him address the dental infection right away.  Antibiotics and dental work should reduce pain fairly quickly.

## 2022-12-29 NOTE — TELEPHONE ENCOUNTER
12-29-22  Medication Question or Refill      What medication are you calling about (include dose and sig)?: pain meds      Do you have any questions or concerns?  Yes: wife called looking for pain meds for tooth pain.  Pain started 12-26-22 pt was seen 12-28-22 @ [facility] dentistry.  Dentist gave an antibiotic Amoxicillin 875mg twice a day for 7 days, but dentist didn't give pt anything for pain.  Pt has an infected tooth.  Pt isn't scheduled to have the tooth pulled until 1-11-23 because pt stated he has to be off his xarelto for a few days before dentist pulls tooth.  Pt taking arthritis strength tylenol 650mg taking 2 tablets and having no success along with ice    Preferred Pharmacy:   [id number] - [place] [address] AT [facility] [address]  [address]  [facility] [address]  Phone: [phone] Fax: [phone]      Could we send this information to you in [place] or would you prefer to receive a phone call?:   Patient would prefer a phone call   Okay to leave a detailed message?: Yes at Home number on file [phone] (home)

## 2023-01-02 NOTE — TELEPHONE ENCOUNTER
Outgoing Call:    Dr. Barboza's message left on pt's VM    Rajni COHEN RN  [facility]

## 2023-01-16 ENCOUNTER — MYC MEDICAL ADVICE (OUTPATIENT)
Dept: INTERNAL MEDICINE | Facility: CLINIC | Age: 68
End: 2023-01-16

## 2023-01-16 DIAGNOSIS — Z87.39 HISTORY OF GOUT: Primary | ICD-10-CM

## 2023-01-16 RX ORDER — PREDNISONE 20 MG/1
TABLET ORAL
Qty: 7 TABLET | Refills: 1 | Status: SHIPPED | OUTPATIENT
Start: 2023-01-16 | End: 2023-03-03

## 2023-03-03 ENCOUNTER — OFFICE VISIT (OUTPATIENT)
Dept: INTERNAL MEDICINE | Facility: CLINIC | Age: 68
End: 2023-03-03
Payer: COMMERCIAL

## 2023-03-03 VITALS
OXYGEN SATURATION: 98 % | HEART RATE: 68 BPM | RESPIRATION RATE: 16 BRPM | TEMPERATURE: 98.2 F | HEIGHT: 69 IN | BODY MASS INDEX: 36.79 KG/M2 | WEIGHT: 248.4 LBS | DIASTOLIC BLOOD PRESSURE: 60 MMHG | SYSTOLIC BLOOD PRESSURE: 124 MMHG

## 2023-03-03 DIAGNOSIS — Z86.711 HISTORY OF PULMONARY EMBOLISM: ICD-10-CM

## 2023-03-03 DIAGNOSIS — I10 ESSENTIAL HYPERTENSION: Primary | ICD-10-CM

## 2023-03-03 DIAGNOSIS — N18.2 CHRONIC RENAL INSUFFICIENCY, STAGE 2 (MILD): ICD-10-CM

## 2023-03-03 DIAGNOSIS — Z87.39 HISTORY OF GOUT: ICD-10-CM

## 2023-03-03 DIAGNOSIS — G47.33 OBSTRUCTIVE SLEEP APNEA ON CPAP: ICD-10-CM

## 2023-03-03 DIAGNOSIS — Z51.81 ENCOUNTER FOR THERAPEUTIC DRUG MONITORING: ICD-10-CM

## 2023-03-03 DIAGNOSIS — Z86.0100 HISTORY OF COLONIC POLYPS: ICD-10-CM

## 2023-03-03 LAB
ANION GAP SERPL CALCULATED.3IONS-SCNC: 10 MMOL/L (ref 7–15)
BUN SERPL-MCNC: 15.9 MG/DL (ref 8–23)
CALCIUM SERPL-MCNC: 9.4 MG/DL (ref 8.8–10.2)
CHLORIDE SERPL-SCNC: 101 MMOL/L (ref 98–107)
CREAT SERPL-MCNC: 1.29 MG/DL (ref 0.67–1.17)
DEPRECATED HCO3 PLAS-SCNC: 28 MMOL/L (ref 22–29)
GFR SERPL CREATININE-BSD FRML MDRD: 61 ML/MIN/1.73M2
GLUCOSE SERPL-MCNC: 131 MG/DL (ref 70–99)
POTASSIUM SERPL-SCNC: 4.1 MMOL/L (ref 3.4–5.3)
SODIUM SERPL-SCNC: 139 MMOL/L (ref 136–145)
URATE SERPL-MCNC: 8.1 MG/DL (ref 3.4–7)

## 2023-03-03 PROCEDURE — 36415 COLL VENOUS BLD VENIPUNCTURE: CPT | Performed by: INTERNAL MEDICINE

## 2023-03-03 PROCEDURE — 84550 ASSAY OF BLOOD/URIC ACID: CPT | Performed by: INTERNAL MEDICINE

## 2023-03-03 PROCEDURE — 99214 OFFICE O/P EST MOD 30 MIN: CPT | Performed by: INTERNAL MEDICINE

## 2023-03-03 PROCEDURE — 80048 BASIC METABOLIC PNL TOTAL CA: CPT | Performed by: INTERNAL MEDICINE

## 2023-03-03 RX ORDER — ALLOPURINOL 100 MG/1
200 TABLET ORAL DAILY
Qty: 180 TABLET | Refills: 3 | Status: SHIPPED | OUTPATIENT
Start: 2023-03-03 | End: 2024-03-18

## 2023-03-03 ASSESSMENT — PATIENT HEALTH QUESTIONNAIRE - PHQ9
SUM OF ALL RESPONSES TO PHQ QUESTIONS 1-9: 0
10. IF YOU CHECKED OFF ANY PROBLEMS, HOW DIFFICULT HAVE THESE PROBLEMS MADE IT FOR YOU TO DO YOUR WORK, TAKE CARE OF THINGS AT HOME, OR GET ALONG WITH OTHER PEOPLE: NOT DIFFICULT AT ALL
SUM OF ALL RESPONSES TO PHQ QUESTIONS 1-9: 0

## 2023-03-03 NOTE — PATIENT INSTRUCTIONS
Start allopurinol 200 mg once a day every day to prevent gout.  Do not start and stop allopurinol, this is meant to be taken every day.    Continue same blood pressure medication no change in other medication.    Your foot neuropathy will remain stable.  Continue to wear good footwear.    Continue to use your CPAP machine every night.    Consider a Prevnar 20 pneumonia vaccine at future appointment.    Return for an adult wellness visit physical exam after September 9.

## 2023-03-03 NOTE — PROGRESS NOTES
Rui Lopez   68 year old male    Date of Visit: 3/3/2023    Chief Complaint   Patient presents with     6 month BP follow up      Subjective  68-year-old lives independently, retired, enjoys woodworking.  He has not been doing his regular walking with the winter.  He has gained some weight back.    He does check his blood pressure about every month and in the 120/70 range.  No orthostasis.    Does have mild chronic renal insufficiency with a creatinine 1.2-1.4.  Creatinine was 1.29 last September.    On chlorthalidone 12.5 mg a day and spironolactone 25 mg a day.    No edema.    He has severe sleep apnea but highly compliant with CPAP.  That was diagnosed in 2019.  No palpitations or atrial fibrillation or increasing shortness of breath.    He has had recurrent gout, episode in January, cleared up with prednisone.  He has had 2 episodes recently.  His uric acid level September 2022 was 7.9.  2 years ago uric acid level was 9.0.    He has not been on allopurinol.    He is trying to avoid certain foods such as shrimp.  He does have some underlying osteoarthritis especially of his hands.    Does not take NSAIDs.    He has some mild neuropathy that is stable associated with his sleep apnea.  He also has a planter fasciitis and wears orthotic shoes all the time.    No flare of his cervical spine stenosis.  Moderate to severe on MRI in 2020 but no pain complaints today.    Glaucoma controlled and up-to-date on his eye appointments.    No new cough or respiratory symptoms.    He still is declining the Pneumovax.    History of pulmonary embolism recurrence 2018, now on chronic Xarelto.  No falls.  No bleeding issues.    PMHx:    Past Medical History:   Diagnosis Date     DVT (deep vein thrombosis) in pregnancy      High cholesterol      History of pulmonary embolus (PE)      Hypertension      Leg swelling      Numbness and tingling      Sarcoidosis      PSHx:    Past Surgical History:   Procedure Laterality Date      "CATARACT EXTRACTION Left 2006     CATARACT EXTRACTION Right 2004     CYSTECTOMY  1973    Testicular cystectomy     IR PULMONARY ANGIOGRAM BILATERAL  1/2/2018     IR THROMBOLYTICS -ARTERIAL INITIAL DAY  1/1/2018     RETINAL DETACHMENT SURGERY Left 2005     RETINAL DETACHMENT SURGERY Right 2004     Immunizations:   Immunization History   Administered Date(s) Administered     COVID-19 Vaccine 12+ (Pfizer 2022) 05/22/2022     COVID-19 Vaccine 12+ (Pfizer) 04/03/2021, 04/24/2021, 11/21/2021     COVID-19 Vaccine Bivalent Booster 12+ (Pfizer) 09/12/2022     Influenza Vaccine 65+ (Fluzone HD) 11/09/2022     TD (ADULT, 7+) 01/01/1999     Tdap (Adacel,Boostrix) 08/13/2013       ROS A comprehensive review of systems was performed and was otherwise negative    Medications, allergies, and problem list were reviewed and updated    Exam  /60 (BP Location: Right arm, Patient Position: Sitting, Cuff Size: Adult Large)   Pulse 68   Temp 98.2  F (36.8  C) (Oral)   Resp 16   Ht 1.765 m (5' 9.49\")   Wt 112.7 kg (248 lb 6.4 oz)   SpO2 98%   BMI 36.17 kg/m    Moderately overweight male.  Able to climb on exam table.  Lungs are clear.  Heart is regular without murmur.  No ankle edema.  Abdomen is nontender.  Moderate osteoarthritic changes of the hands, no acute inflammation    Assessment/Plan  1. Essential hypertension  Controlled.  Continue current chlorthalidone and spironolactone.  Follow-up in 6 months for physical exam    2. Chronic renal insufficiency, stage 2 (mild)  Has been stable.  With his history of high uric acid and recurrent gout, I have recommended he start allopurinol.    Avoid NSAIDs  - Basic metabolic panel    3. History of gout  As above.  I did discuss risk of rash and allergic reaction to allopurinol.  I also discussed the importance of not stopping it once he started it.  - Uric acid  - allopurinol (ZYLOPRIM) 100 MG tablet; Take 2 tablets (200 mg) by mouth daily To prevent gout  Dispense: 180 tablet; " Refill: 3    4. Obstructive sleep apnea on CPAP, severe sleep apnea  Continue CPAP    5. History of pulmonary embolism  Chronic Xarelto    6. History of colonic polyps  5-year colonoscopy will be due October 2026    7. Encounter for therapeutic drug monitoring    - Basic metabolic panel    He declines Pneumovax, which I recommended for patient.    Peripheral neuropathy associated with his sleep apnea, fairly mild, just some numbness on his feet.  No sores or falls.  He also has plantar fasciitis but wears good orthotic shoes now with some benefit.      Return in about 6 months (around 9/11/2023) for Routine preventive.   Patient Instructions   Start allopurinol 200 mg once a day every day to prevent gout.  Do not start and stop allopurinol, this is meant to be taken every day.    Continue same blood pressure medication no change in other medication.    Your foot neuropathy will remain stable.  Continue to wear good footwear.    Continue to use your CPAP machine every night.    Consider a Prevnar 20 pneumonia vaccine at future appointment.    Return for an adult wellness visit physical exam after September 9.    Meng Barboza MD, MD        Current Outpatient Medications   Medication Sig Dispense Refill     allopurinol (ZYLOPRIM) 100 MG tablet Take 2 tablets (200 mg) by mouth daily To prevent gout 180 tablet 3     chlorthalidone (HYGROTON) 25 MG tablet TAKE 1/2 TABLET(12.5 MG) BY MOUTH DAILY 45 tablet 3     latanoprost (XALATAN) 0.005 % ophthalmic solution [LATANOPROST (XALATAN) 0.005 % OPHTHALMIC SOLUTION] USE ONE DROP IN OU D  4     rivaroxaban ANTICOAGULANT (XARELTO) 20 MG TABS tablet Take 1 tablet (20 mg) by mouth every evening 90 tablet 3     spironolactone (ALDACTONE) 25 MG tablet Take 1 tablet (25 mg) by mouth daily 90 tablet 3     vitamin D3 (CHOLECALCIFEROL) 50 mcg (2000 units) tablet Take 1 tablet by mouth daily       No Known Allergies  Social History     Tobacco Use     Smoking status: Never     Smokeless  tobacco: Never   Substance Use Topics     Alcohol use: Yes     Comment: Alcoholic Drinks/day: occ beer     Drug use: No             Subjective   Rui is a 68 year old, presenting for the following health issues:  6 month BP follow up       History of Present Illness       Hypertension: He presents for follow up of hypertension.  He does check blood pressure  regularly outside of the clinic. Outpatient blood pressures have not been over 140/90. He does not follow a low salt diet.     He eats 0-1 servings of fruits and vegetables daily.He consumes 1 sweetened beverage(s) daily.He exercises with enough effort to increase his heart rate 9 or less minutes per day.  He exercises with enough effort to increase his heart rate 3 or less days per week.   He is taking medications regularly.    Today's PHQ-9         PHQ-9 Total Score: 0    PHQ-9 Q9 Thoughts of better off dead/self-harm past 2 weeks :   Not at all    How difficult have these problems made it for you to do your work, take care of things at home, or get along with other people: Not difficult at all             Review of Systems         Objective    There were no vitals taken for this visit.  There is no height or weight on file to calculate BMI.  Physical Exam

## 2023-07-18 ENCOUNTER — OFFICE VISIT (OUTPATIENT)
Dept: INTERNAL MEDICINE | Facility: CLINIC | Age: 68
End: 2023-07-18
Payer: COMMERCIAL

## 2023-07-18 VITALS
DIASTOLIC BLOOD PRESSURE: 63 MMHG | WEIGHT: 243.4 LBS | HEIGHT: 69 IN | TEMPERATURE: 98.2 F | OXYGEN SATURATION: 98 % | BODY MASS INDEX: 36.05 KG/M2 | SYSTOLIC BLOOD PRESSURE: 104 MMHG | RESPIRATION RATE: 16 BRPM | HEART RATE: 62 BPM

## 2023-07-18 DIAGNOSIS — I27.82 OTHER CHRONIC PULMONARY EMBOLISM WITHOUT ACUTE COR PULMONALE (H): ICD-10-CM

## 2023-07-18 DIAGNOSIS — M62.830 BACK MUSCLE SPASM: Primary | ICD-10-CM

## 2023-07-18 PROCEDURE — 99213 OFFICE O/P EST LOW 20 MIN: CPT | Performed by: INTERNAL MEDICINE

## 2023-07-18 RX ORDER — CYCLOBENZAPRINE HCL 5 MG
5 TABLET ORAL 3 TIMES DAILY PRN
Qty: 30 TABLET | Refills: 0 | Status: SHIPPED | OUTPATIENT
Start: 2023-07-18 | End: 2023-09-11

## 2023-07-18 NOTE — PATIENT INSTRUCTIONS
OTC:  Voltaren gel - apply it 2-3x/day as needed   Aspercream with Lidocaine  Therma patch    Flexeryl muscle relaxant as needed.

## 2023-07-18 NOTE — PROGRESS NOTES
"  Assessment & Plan     Back muscle spasm  67 yo male with h/o PE, on Xarelto, is experiencing pain in the right mid back for 3-4 weeks, mostly early morning as a muscle spasm. During the day, when more active, does not have the pain, No h/o kidney stones, shingle rash. No injuries. Heat helps.  Tylenol does not make a difference. Cannot take NSAIds because of the Xarelto.  On the exam, no skin changes, no CVA tenderness. Present muscle spasm, worse with waist rotation. No radiculopathy.    He will use OTC topical agents, muscle relaxant and will start PT.  - Physical Therapy Referral; Future  - cyclobenzaprine (FLEXERIL) 5 MG tablet; Take 1 tablet (5 mg) by mouth 3 times daily as needed for muscle spasms    Other chronic pulmonary embolism without acute cor pulmonale (H)  Stable on Xarelto.        Patient Instructions   OTC:  Voltaren gel - apply it 2-3x/day as needed   Aspercream with Lidocaine  Therma patch    Flexeryl muscle relaxant as needed.         BMI:   Estimated body mass index is 35.44 kg/m  as calculated from the following:    Height as of this encounter: 1.765 m (5' 9.49\").    Weight as of this encounter: 110.4 kg (243 lb 6.4 oz).       Return in about 2 weeks (around 8/1/2023) for Follow up.    Megan Reddy MD  [facility]    Medhat Fabian is a 68 year old, presenting for the following health issues:  Musculoskeletal Problem (Back Spasm started 3/4 Week's Ago And Is Getting Worse. )        7/18/2023     1:37 PM   Additional Questions   Roomed by Britta     Musculoskeletal Problem    History of Present Illness       Back Pain:  He presents for follow up of back pain. Patient's back pain is a new problem.    Original cause of back pain: turning/bending  First noticed back pain: 1-4 weeks ago  Patient feels back pain: 2 to 4 times per dayLocation of back pain:  Right middle of back  Description of back pain: sharp  Back pain spreads: nowhere    Since patient first " "noticed back pain, pain is: always present, but gets better and worse  Does back pain interfere with his job:  Not applicable  On a scale of 1-10 (10 being the worst), patient describes pain as:  9  What makes back pain worse: lying down  Acupuncture: not tried  Acetaminophen: not helpful  Activity or exercise: not tried  Chiropractor:  Not tried  Cold: helpful  Heat: helpful  Massage: not helpful  Muscle relaxants: not tried  NSAIDS: not tried  Opioids: not tried  Physical Therapy: not tried  Rest: not helpful  Steroid Injection: not tried  Stretching: not helpful  Surgery: not tried  TENS unit: not tried  Topical pain relievers: not helpful  Other healthcare providers patient is seeing for back pain: None    He eats 2-3 servings of fruits and vegetables daily.He consumes 0 sweetened beverage(s) daily.He exercises with enough effort to increase his heart rate 20 to 29 minutes per day.  He exercises with enough effort to increase his heart rate 5 days per week.   He is taking medications regularly.               Review of Systems         Objective    /63   Pulse 62   Temp 98.2  F (36.8  C)   Resp 16   Ht 1.765 m (5' 9.49\")   Wt 110.4 kg (243 lb 6.4 oz)   SpO2 98%   BMI 35.44 kg/m    Body mass index is 35.44 kg/m .  Physical Exam                       "

## 2023-07-25 ENCOUNTER — THERAPY VISIT (OUTPATIENT)
Dept: PHYSICAL THERAPY | Facility: REHABILITATION | Age: 68
End: 2023-07-25
Attending: INTERNAL MEDICINE
Payer: COMMERCIAL

## 2023-07-25 DIAGNOSIS — M62.830 BACK MUSCLE SPASM: ICD-10-CM

## 2023-07-25 PROCEDURE — 97110 THERAPEUTIC EXERCISES: CPT | Mod: GP | Performed by: PHYSICAL THERAPIST

## 2023-07-25 PROCEDURE — 97161 PT EVAL LOW COMPLEX 20 MIN: CPT | Mod: GP | Performed by: PHYSICAL THERAPIST

## 2023-07-25 NOTE — PROGRESS NOTES
PHYSICAL THERAPY EVALUATION  Type of Visit: Evaluation    See electronic medical record for Abuse and Falls Screening details.    Subjective       Presenting condition or subjective complaint: back  Date of onset: 07/04/23    Relevant medical history: High blood pressure; Overweight   Dates & types of surgery:      Prior diagnostic imaging/testing results:       Prior therapy history for the same diagnosis, illness or injury: No      Prior Level of Function  Transfers: Independent  Ambulation: Independent  ADL: Independent  IADL:     Living Environment  Social support: With a significant other or spouse   Type of home: House   Stairs to enter the home: Yes 2     Ramp: No   Stairs inside the home: Yes 10     Help at home: None  Equipment owned:       Employment: No retired  at [employer]  Hobbies/Interests: wood working    Patient goals for therapy: return to woodworking,  the mornig without pain or difficutly    Pain assessment: Pain present  Location: right thoracolumbar junction/Rating: 3/10 today     Objective       LUMBAR SPINE EVALUATION  PAIN: Pain Level at Rest: 1/10  Pain Level with Use: 1/10  Pain Location: thoracic spine and lumbar spine  Pain is Worst: 9/10  Pain is Exacerbated By: twisting trunk and bending to the side  Pain is Relieved By: heat, rest, and use    POSTURE: Standing Posture: Rounded shoulders, Forward head, Lordosis decreased, Thoracic kyphosis increased  Sitting Posture: Rounded shoulders, Forward head, Thoracic kyphosis increased    GAIT:  WBOS slightly increased lateral sway    Balance/Proprioception: NA    Weight Bearing Alignment:   Non-Weight Bearing Alignment:      ROM:   (Degrees) Left AROM Left PROM  Right AROM Right PROM   Hip Flexion       Hip Extension       Hip Abduction       Hip Adduction       Hip Internal Rotation  5  0   Hip External Rotation  30  30   Knee Flexion       Lumbar Rotation Mod with pain  Mod with pain    Lumbar Side Bending Mod  with pain Mod with pain   Lumbar Flexion Min   Lumbar Extension mod   Pain:   End feel:     PELVIC/SI SCREEN: WNL    Strength: hip adduction and abduction 4+/5 bilateral    MYOTOMES:    Left Right   T12-L3 (Hip Flexion) 4 4   L2-4 (Quads)  5 5   L4 (Ankle DF) 5 5   L5 (Great Toe Ext)     S1 (Toe Raise)       DTR S: NT  CORD SIGNS: NT  DERMATOMES: NT  NEURAL TENSION:    Left Right   SLR Negative  Negative    SLR with DF     Femoral Nerve     Slump     Andre (Lumbar)     Andre (Thoracic)     Andre (Cervical)     Median     Ulnar     Radial        FLEXIBILITY: Decreased hip IR R, Decreased hip ER R, Decreased piriformis R, Decreased hip flexors R, Decreased hamstrings R       PELVIS/SI SPECIAL TESTS: WNL  FUNCTIONAL TESTS: NT  PALPATION:   + Tenderness At Location Left Right   Quadratus Lumborum     Erector Spinae  +   Piriformis      PSIS     ASIS     Iliac Crest     Glut Medius     Greater Trochanter     Ischial Tuberosity     Hamstrings     Hip Flexors     Vertebral        SPINAL SEGMENTAL CONCLUSIONS:  no pain with mobility testing      Assessment & Plan   CLINICAL IMPRESSIONS  Medical Diagnosis: Back muscle spasm    Treatment Diagnosis: Back muscle spasm   Impression/Assessment: Patient is a 68 year old male with right thoracolumbar back spasm and pain with  complaints.  The following significant findings have been identified: Pain, Decreased ROM/flexibility, Decreased joint mobility, Decreased strength, Impaired muscle performance, Decreased activity tolerance, and Impaired posture. These impairments interfere with their ability to perform recreational activities and household chores as compared to previous level of function.     Clinical Decision Making (Complexity):  Clinical Presentation: Stable/Uncomplicated  Clinical Presentation Rationale: based on medical and personal factors listed in PT evaluation  Clinical Decision Making (Complexity): Low complexity    PLAN OF CARE  Treatment  Interventions:  Interventions: Manual Therapy, Neuromuscular Re-education, Therapeutic Exercise, Self-Care/Home Management    Long Term Goals     PT Goal 1  Goal Identifier: HEP  Goal Description: Patient will be independent with HEP and self management of symptoms  Rationale: to maximize safety and independence with performance of ADLs and functional tasks  Goal Progress: initial  Target Date: 10/23/23  PT Goal 2  Goal Identifier: return to yardwork/woodworking  Goal Description: Patient will demonstrate appropriate lifting techniques without pain to facilitate return to yardwork and woodworking  Rationale: to maximize safety and independence with performance of ADLs and functional tasks  Goal Progress: initial  Target Date: 10/23/23  PT Goal 3  Goal Identifier: seat belt  Goal Description: Patient will rotate his trunk to put on his seat belt without pain.  Rationale: to maximize safety and independence with performance of ADLs and functional tasks  Goal Progress: initial  Target Date: 10/23/23      Frequency of Treatment: 1x/week  Duration of Treatment: 90 days    Recommended Referrals to Other Professionals: not at this time    Education Assessment:   Learner/Method: Patient;No Barriers to Learning    Risks and benefits of evaluation/treatment have been explained.   Patient/Family/caregiver agrees with Plan of Care.     Evaluation Time:     PT Eval, Low Complexity Minutes (46550): 25       Signing Clinician: Damian Dawn, PT      [facility]                                                                                   OUTPATIENT PHYSICAL THERAPY      PLAN OF TREATMENT FOR OUTPATIENT REHABILITATION   Patient's Last Name, First Name, Rui Pyle YOB: 1955   Provider's Name   [facility]   Medical Record No.  [id number]     Onset Date: 07/04/23  Start of Care Date: 07/25/23     Medical Diagnosis:  Back muscle  spasm      PT Treatment Diagnosis:  Back muscle spasm Plan of Treatment  Frequency/Duration: 1x/week/ 90 days    Certification date from 07/25/23 to 10/23/23         See note for plan of treatment details and functional goals     Damian Dawn, PT                         I CERTIFY THE NEED FOR THESE SERVICES FURNISHED UNDER        THIS PLAN OF TREATMENT AND WHILE UNDER MY CARE     (Physician attestation of this document indicates review and certification of the therapy plan).                Referring Provider:  Megan Reddy      Initial Assessment  See Epic Evaluation- Start of Care Date: 07/25/23

## 2023-08-11 ENCOUNTER — THERAPY VISIT (OUTPATIENT)
Dept: PHYSICAL THERAPY | Facility: REHABILITATION | Age: 68
End: 2023-08-11
Payer: COMMERCIAL

## 2023-08-11 DIAGNOSIS — M62.830 BACK MUSCLE SPASM: Primary | ICD-10-CM

## 2023-08-11 PROCEDURE — 97112 NEUROMUSCULAR REEDUCATION: CPT | Mod: GP | Performed by: PHYSICAL THERAPIST

## 2023-08-11 PROCEDURE — 97110 THERAPEUTIC EXERCISES: CPT | Mod: GP | Performed by: PHYSICAL THERAPIST

## 2023-09-05 ASSESSMENT — ENCOUNTER SYMPTOMS
HEMATURIA: 0
JOINT SWELLING: 1
PALPITATIONS: 0
ABDOMINAL PAIN: 0
CHILLS: 0
DYSURIA: 0
HEMATOCHEZIA: 0
SHORTNESS OF BREATH: 0
FREQUENCY: 0
NERVOUS/ANXIOUS: 0
DIZZINESS: 0
DIARRHEA: 0
MYALGIAS: 0
PARESTHESIAS: 0
NAUSEA: 0
FEVER: 0
CONSTIPATION: 0
HEADACHES: 0
HEARTBURN: 0
SORE THROAT: 0
WEAKNESS: 0
COUGH: 0
ARTHRALGIAS: 1
EYE PAIN: 0

## 2023-09-05 ASSESSMENT — ACTIVITIES OF DAILY LIVING (ADL): CURRENT_FUNCTION: NO ASSISTANCE NEEDED

## 2023-09-11 ENCOUNTER — OFFICE VISIT (OUTPATIENT)
Dept: INTERNAL MEDICINE | Facility: CLINIC | Age: 68
End: 2023-09-11
Payer: COMMERCIAL

## 2023-09-11 VITALS
RESPIRATION RATE: 16 BRPM | TEMPERATURE: 98 F | WEIGHT: 237 LBS | HEART RATE: 77 BPM | BODY MASS INDEX: 35.1 KG/M2 | OXYGEN SATURATION: 97 % | HEIGHT: 69 IN | DIASTOLIC BLOOD PRESSURE: 54 MMHG | SYSTOLIC BLOOD PRESSURE: 104 MMHG

## 2023-09-11 DIAGNOSIS — Z00.00 ENCOUNTER FOR MEDICARE ANNUAL WELLNESS EXAM: Primary | ICD-10-CM

## 2023-09-11 DIAGNOSIS — Z51.81 ENCOUNTER FOR THERAPEUTIC DRUG MONITORING: ICD-10-CM

## 2023-09-11 DIAGNOSIS — Z23 NEED FOR VACCINATION FOR STREP PNEUMONIAE: ICD-10-CM

## 2023-09-11 DIAGNOSIS — I10 ESSENTIAL HYPERTENSION: ICD-10-CM

## 2023-09-11 DIAGNOSIS — Z87.39 HISTORY OF GOUT: ICD-10-CM

## 2023-09-11 DIAGNOSIS — Z86.711 HISTORY OF PULMONARY EMBOLISM: ICD-10-CM

## 2023-09-11 DIAGNOSIS — Z23 NEED FOR IMMUNIZATION AGAINST INFLUENZA: ICD-10-CM

## 2023-09-11 DIAGNOSIS — Z12.5 SCREENING FOR PROSTATE CANCER: ICD-10-CM

## 2023-09-11 DIAGNOSIS — H40.9 GLAUCOMA, UNSPECIFIED GLAUCOMA TYPE, UNSPECIFIED LATERALITY: ICD-10-CM

## 2023-09-11 DIAGNOSIS — Z86.0100 HISTORY OF COLONIC POLYPS: ICD-10-CM

## 2023-09-11 LAB
ALBUMIN SERPL BCG-MCNC: 4.1 G/DL (ref 3.5–5.2)
ALP SERPL-CCNC: 84 U/L (ref 40–129)
ALT SERPL W P-5'-P-CCNC: 15 U/L (ref 0–70)
ANION GAP SERPL CALCULATED.3IONS-SCNC: 11 MMOL/L (ref 7–15)
AST SERPL W P-5'-P-CCNC: 25 U/L (ref 0–45)
BILIRUB SERPL-MCNC: 0.6 MG/DL
BUN SERPL-MCNC: 23.2 MG/DL (ref 8–23)
CALCIUM SERPL-MCNC: 8.9 MG/DL (ref 8.8–10.2)
CHLORIDE SERPL-SCNC: 102 MMOL/L (ref 98–107)
CHOLEST SERPL-MCNC: 159 MG/DL
CREAT SERPL-MCNC: 1.3 MG/DL (ref 0.67–1.17)
DEPRECATED HCO3 PLAS-SCNC: 26 MMOL/L (ref 22–29)
EGFRCR SERPLBLD CKD-EPI 2021: 61 ML/MIN/1.73M2
ERYTHROCYTE [DISTWIDTH] IN BLOOD BY AUTOMATED COUNT: 12.5 % (ref 10–15)
GLUCOSE SERPL-MCNC: 103 MG/DL (ref 70–99)
HCT VFR BLD AUTO: 48.4 % (ref 40–53)
HDLC SERPL-MCNC: 39 MG/DL
HGB BLD-MCNC: 16.7 G/DL (ref 13.3–17.7)
LDLC SERPL CALC-MCNC: 83 MG/DL
MCH RBC QN AUTO: 31.4 PG (ref 26.5–33)
MCHC RBC AUTO-ENTMCNC: 34.5 G/DL (ref 31.5–36.5)
MCV RBC AUTO: 91 FL (ref 78–100)
NONHDLC SERPL-MCNC: 120 MG/DL
PLATELET # BLD AUTO: 161 10E3/UL (ref 150–450)
POTASSIUM SERPL-SCNC: 4.3 MMOL/L (ref 3.4–5.3)
PROT SERPL-MCNC: 6.4 G/DL (ref 6.4–8.3)
PSA SERPL DL<=0.01 NG/ML-MCNC: 0.21 NG/ML (ref 0–4.5)
RBC # BLD AUTO: 5.32 10E6/UL (ref 4.4–5.9)
SODIUM SERPL-SCNC: 139 MMOL/L (ref 136–145)
TRIGL SERPL-MCNC: 186 MG/DL
URATE SERPL-MCNC: 6.5 MG/DL (ref 3.4–7)
WBC # BLD AUTO: 7.5 10E3/UL (ref 4–11)

## 2023-09-11 PROCEDURE — 80061 LIPID PANEL: CPT | Performed by: INTERNAL MEDICINE

## 2023-09-11 PROCEDURE — 99213 OFFICE O/P EST LOW 20 MIN: CPT | Mod: 25 | Performed by: INTERNAL MEDICINE

## 2023-09-11 PROCEDURE — 36415 COLL VENOUS BLD VENIPUNCTURE: CPT | Performed by: INTERNAL MEDICINE

## 2023-09-11 PROCEDURE — 90662 IIV NO PRSV INCREASED AG IM: CPT | Performed by: INTERNAL MEDICINE

## 2023-09-11 PROCEDURE — 84550 ASSAY OF BLOOD/URIC ACID: CPT | Performed by: INTERNAL MEDICINE

## 2023-09-11 PROCEDURE — 85027 COMPLETE CBC AUTOMATED: CPT | Performed by: INTERNAL MEDICINE

## 2023-09-11 PROCEDURE — G0008 ADMIN INFLUENZA VIRUS VAC: HCPCS | Performed by: INTERNAL MEDICINE

## 2023-09-11 PROCEDURE — G0438 PPPS, INITIAL VISIT: HCPCS | Performed by: INTERNAL MEDICINE

## 2023-09-11 PROCEDURE — 80053 COMPREHEN METABOLIC PANEL: CPT | Performed by: INTERNAL MEDICINE

## 2023-09-11 PROCEDURE — G0103 PSA SCREENING: HCPCS | Performed by: INTERNAL MEDICINE

## 2023-09-11 RX ORDER — SPIRONOLACTONE 25 MG/1
25 TABLET ORAL DAILY
Qty: 90 TABLET | Refills: 3 | Status: SHIPPED | OUTPATIENT
Start: 2023-09-11 | End: 2024-03-08

## 2023-09-11 RX ORDER — CHLORTHALIDONE 25 MG/1
TABLET ORAL
Qty: 45 TABLET | Refills: 3 | Status: SHIPPED | OUTPATIENT
Start: 2023-09-11 | End: 2024-09-16

## 2023-09-11 ASSESSMENT — ENCOUNTER SYMPTOMS
COUGH: 0
DYSURIA: 0
MYALGIAS: 0
NERVOUS/ANXIOUS: 0
FEVER: 0
SORE THROAT: 0
CONSTIPATION: 0
EYE PAIN: 0
PALPITATIONS: 0
NAUSEA: 0
DIARRHEA: 0
SHORTNESS OF BREATH: 0
CHILLS: 0
HEMATOCHEZIA: 0
HEARTBURN: 0
HEMATURIA: 0
JOINT SWELLING: 1
ABDOMINAL PAIN: 0
DIZZINESS: 0
ARTHRALGIAS: 1
WEAKNESS: 0
HEADACHES: 0
PARESTHESIAS: 0
FREQUENCY: 0

## 2023-09-11 ASSESSMENT — ACTIVITIES OF DAILY LIVING (ADL): CURRENT_FUNCTION: NO ASSISTANCE NEEDED

## 2023-09-11 ASSESSMENT — PATIENT HEALTH QUESTIONNAIRE - PHQ9
10. IF YOU CHECKED OFF ANY PROBLEMS, HOW DIFFICULT HAVE THESE PROBLEMS MADE IT FOR YOU TO DO YOUR WORK, TAKE CARE OF THINGS AT HOME, OR GET ALONG WITH OTHER PEOPLE: NOT DIFFICULT AT ALL
SUM OF ALL RESPONSES TO PHQ QUESTIONS 1-9: 0
SUM OF ALL RESPONSES TO PHQ QUESTIONS 1-9: 0

## 2023-09-11 NOTE — PATIENT INSTRUCTIONS
Monitor blood pressure.  Goal blood pressure less than 135/85 but not less than 110/60.  If your blood pressure is running less than 110/60, or you are having lightheaded dizzy spells, contact me and I would have you stop the chlorthalidone.    Continue to get your regular walking exercise.  Continue to work on further weight loss.    Your colonoscopy will be due October 2026.    Get the new COVID-vaccine when available this fall.    I would recommend a Prevnar 20 pneumonia vaccine for you in the future.    You are due for a tetanus shot if you have a cut, puncture wound or bite.    Follow-up with me in 6 months for a blood pressure follow-up appointment.

## 2023-09-11 NOTE — PROGRESS NOTES
SUBJECTIVE:   Rui is a 68 year old who presents for Preventive Visit.    Lives independently, retired and enjoys woodworking.  He has been making it a point to walk for 5 days a week for over a mile this year and has been doing that regularly.  Is left knee osteoarthritis is not bothering him enough to affect his walking.  He found some good comfortable orthotic shoes with inserts that he can walk with without foot pain now.  He has some mild foot neuropathy but no sores or planter fasciitis symptoms now.    History of gout, 2 times last winter but none since being on allopurinol.  He did have a high uric acid level in March of 8.1.    His blood pressure has been well controlled in the past, and is slightly low today.  He states at home it is usually running 110/60-1 20/70.  He denies any orthostasis or lightheaded dizzy spells.    He is still on chlorthalidone 12.5 mg a day and spironolactone 25 mg a day for hypertension.    He has mild chronic renal insufficiency with a creatinine stable at 1.3 last March.  He does not take ibuprofen or other NSAIDs.    He has severe obstructive sleep apnea diagnosed in 2019 but highly compliant with the CPAP and feels he sleeps well at night.  No palpitations or history of atrial fibrillation.    History of moderate to severe cervical spine stenosis 2020 MRI but no neck pain complaints today.    History of pulmonary embolism recurrence in 2018 and now on long-term Xarelto.  He is not up on ladders or doing high risk behavior.  No falls.  No bleeding issues.    His bowels are regular without blood in stool.    October 2021 colonoscopy had 1 polyp with 5-year follow-up plan.    Glaucoma is under good control, no vision changes and sees ophthalmology soon for a 6-month checkup.    No cough or respiratory symptoms.  No swallowing difficulty.    No urinary complaints.  No rash in the groin or hernia symptoms.    He does not get bothered with the umbilical hernia that he has.       "9/11/2023     9:31 AM   Additional Questions   Roomed by Nayana CADET   Accompanied by [email or web address]         9/11/2023     9:31 AM   Patient Reported Additional Medications   Patient reports taking the following new medications no       Are you in the first 12 months of your Medicare coverage?  No    Healthy Habits:     In general, how would you rate your overall health?  Good    Frequency of exercise:  4-5 days/week    Duration of exercise:  15-30 minutes    Do you usually eat at least 4 servings of fruit and vegetables a day, include whole grains    & fiber and avoid regularly eating high fat or \"junk\" foods?  No    Taking medications regularly:  Yes    Medication side effects:  Not applicable    Ability to successfully perform activities of daily living:  No assistance needed    Home Safety:  No safety concerns identified    Hearing Impairment:  Feel that people are mumbling or not speaking clearly, need to ask people to speak up or repeat themselves, find that men's voices are easier to understand than woman's and difficulty understanding soft or whispered speech    In the past 6 months, have you been bothered by leaking of urine?  No    In general, how would you rate your overall mental or emotional health?  Good    Additional concerns today:  No        Have you ever done Advance Care Planning? (For example, a Health Directive, POLST, or a discussion with a medical provider or your loved ones about your wishes): Yes, advance care planning is on file.    We will   Fall risk  Fallen 2 or more times in the past year?: No  Any fall with injury in the past year?: No    Cognitive Screening   1) Repeat 3 items (Leader, Season, Table)    2) Clock draw: NORMAL  3) 3 item recall: Recalls 3 objects  Results: 3 items recalled: COGNITIVE IMPAIRMENT LESS LIKELY    Mini-CogTM Copyright TAYLOR Hawthorne. Licensed by the author for use in [facility]; reprinted with permission (sadie@.[facility]). All rights reserved.      Do you have sleep " apnea, excessive snoring or daytime drowsiness? : yes    Reviewed and updated as needed this visit by clinical staff   Tobacco  Allergies  Meds              Reviewed and updated as needed this visit by Provider     Meds             Social History     Tobacco Use    Smoking status: Never    Smokeless tobacco: Never   Substance Use Topics    Alcohol use: Yes     Comment: Alcoholic Drinks/day: occ beer             9/5/2023    11:19 AM   Alcohol Use   Prescreen: >3 drinks/day or >7 drinks/week? No          No data to display              Do you have a current opioid prescription? No  Do you use any other controlled substances or medications that are not prescribed by a provider? None              Current providers sharing in care for this patient include:   Patient Care Team:  Meng Barboza MD as PCP - General (Internal Medicine)  Meng Barboza MD as Assigned PCP  Sagar Parikh MD as MD  Dane Ellison DPM as Assigned Surgical Provider    The following health maintenance items are reviewed in Epic and correct as of today:  Health Maintenance   Topic Date Due    DEPRESSION ACTION PLAN  Never done    HEPATITIS C SCREENING  Never done    ZOSTER IMMUNIZATION (1 of 2) Never done    Pneumococcal Vaccine: 65+ Years (1 - PCV) Never done    COVID-19 Vaccine (6 - Pfizer series) 01/12/2023    ANNUAL REVIEW OF HM ORDERS  03/08/2023    DTAP/TDAP/TD IMMUNIZATION (2 - Td or Tdap) 08/13/2023    MEDICARE ANNUAL WELLNESS VISIT  09/09/2023    PHQ-9  03/11/2024    FALL RISK ASSESSMENT  09/11/2024    LIPID  09/09/2027    ADVANCE CARE PLANNING  09/11/2028    COLORECTAL CANCER SCREENING  10/11/2031    INFLUENZA VACCINE  Completed    AORTIC ANEURYSM SCREENING (SYSTEM ASSIGNED)  Completed    IPV IMMUNIZATION  Aged Out    HPV IMMUNIZATION  Aged Out    MENINGITIS IMMUNIZATION  Aged Out     Current Outpatient Medications   Medication Sig Dispense Refill    allopurinol (ZYLOPRIM) 100 MG tablet Take 2 tablets (200 mg) by mouth daily To  "prevent gout 180 tablet 3    chlorthalidone (HYGROTON) 25 MG tablet TAKE 1/2 TABLET(12.5 MG) BY MOUTH DAILY 45 tablet 3    latanoprost (XALATAN) 0.005 % ophthalmic solution [LATANOPROST (XALATAN) 0.005 % OPHTHALMIC SOLUTION] USE ONE DROP IN OU D  4    rivaroxaban ANTICOAGULANT (XARELTO) 20 MG TABS tablet Take 1 tablet (20 mg) by mouth every evening 90 tablet 3    spironolactone (ALDACTONE) 25 MG tablet Take 1 tablet (25 mg) by mouth daily 90 tablet 3    vitamin D3 (CHOLECALCIFEROL) 50 mcg (2000 units) tablet Take 1 tablet by mouth daily       No Known Allergies          Review of Systems   Constitutional:  Negative for chills and fever.   HENT:  Negative for congestion, ear pain, hearing loss and sore throat.    Eyes:  Negative for pain and visual disturbance.   Respiratory:  Negative for cough and shortness of breath.    Cardiovascular:  Positive for peripheral edema. Negative for chest pain and palpitations.   Gastrointestinal:  Negative for abdominal pain, constipation, diarrhea, heartburn, hematochezia and nausea.   Genitourinary:  Negative for dysuria, frequency, genital sores, hematuria, impotence, penile discharge and urgency.   Musculoskeletal:  Positive for arthralgias and joint swelling. Negative for myalgias.   Skin:  Negative for rash.   Neurological:  Negative for dizziness, weakness, headaches and paresthesias.   Psychiatric/Behavioral:  Negative for mood changes. The patient is not nervous/anxious.        OBJECTIVE:   /54   Pulse 77   Temp 98  F (36.7  C)   Resp 16   Ht 1.753 m (5' 9\")   Wt 107.5 kg (237 lb)   SpO2 97%   BMI 35.00 kg/m   Estimated body mass index is 35 kg/m  as calculated from the following:    Height as of this encounter: 1.753 m (5' 9\").    Weight as of this encounter: 107.5 kg (237 lb).  Physical Exam  Moderately overweight male.  Alert and oriented x3 with good mood and affect.  Cognition normal.  Clock face drawing normal and word recall normal.  Mobility exam " normal.  No parkinsonian signs.  Pupils and irises equal and reactive.  Extraocular muscles intact.  Minimal cerumen and normal tympanic membranes.  Pharynx is normal.  Teeth in good condition.  No leukoplakia.  No cervical or supraclavicular or axillary adenopathy.  No JVD and no carotid bruits.  No thyromegaly or nodularity.  Lungs clear to auscultation with good respiratory excursion.  Heart is regular without murmur rub or gallop.  No significant ankle edema +1 pedal pulses.  Abdomen is moderately overweight but nontender.  Umbilical hernia reducible.  No hepatosplenomegaly or pulsatile abdominal mass.  Skin exam without suspicious skin lesions.  Feet exam without preulcerative calluses.  He declined  exam.    ASSESSMENT / PLAN:   (Z00.00) Encounter for Medicare annual wellness exam  (primary encounter diagnosis)  Comment: Main focus for patient is a stroke risk with his hypertension and sleep apnea, lipids are under good control at this time.    He has excellent cholesterol levels and does not have other vascular disease risk factors.    Continue to work on diet and weight loss.  I congratulated him on his regular walking exercise.    Patient is alone.    He declines the Prevnar 20 vaccine again today, I encouraged him to consider that in the future.  We discussed the COVID-vaccine for the fall.    Patient instructions:  Monitor blood pressure.  Goal blood pressure less than 135/85 but not less than 110/60.  If your blood pressure is running less than 110/60, or you are having lightheaded dizzy spells, contact me and I would have you stop the chlorthalidone.    Continue to get your regular walking exercise.  Continue to work on further weight loss.    Your colonoscopy will be due October 2026.    Get the new COVID-vaccine when available this fall.    I would recommend a Prevnar 20 pneumonia vaccine for you in the future.    You are due for a tetanus shot if you have a cut, puncture wound or bite.    Follow-up  with me in 6 months for a blood pressure follow-up appointment.      (I10) Essential hypertension  Comment: Blood pressure low, but fasting today.  We did discuss possibly stopping the chlorthalidone.  We will check blood pressure more regularly and if lower blood pressures less than 110/60 or lightheaded dizzy spells or if his labs are significantly abnormal today, consider stopping chlorthalidone.  Otherwise follow-up in 6 months  Plan: chlorthalidone (HYGROTON) 25 MG tablet,         spironolactone (ALDACTONE) 25 MG tablet, Lipid         Profile            (Z86.711) History of pulmonary embolism  Comment: Long-term Xarelto.  We discussed bleeding risk with Xarelto  Plan: rivaroxaban ANTICOAGULANT (XARELTO) 20 MG TABS         tablet            (Z12.5) Screening for prostate cancer  Comment: Routine yearly screening, no symptoms  Plan: Prostate Specific Antigen Screen            (Z86.010) History of colonic polyps  Comment: Patient was reminded that his 5-year colonoscopy is due October 2026  Plan: No new symptoms    (H40.9) Glaucoma, unspecified glaucoma type, unspecified laterality  Comment: Controlled  Plan: 6-month follow-up with ophthalmology is scheduled for later this fall    (Z51.81) Encounter for therapeutic drug monitoring  Comment:   Plan: CBC with platelets, Comprehensive metabolic         panel            (Z23) Need for immunization against influenza  Comment: Given today  Plan: INFLUENZA VACCINE 65+ (FLUZONE HD)            (Z23) Need for vaccination for Strep pneumoniae  Comment: Patient declined  Plan:     (Z87.39) History of gout  Comment: No recurrence on allopurinol, continue allopurinol long-term  Plan: Uric acid            Patient has been advised of split billing requirements and indicates understanding: Yes      COUNSELING:  Reviewed preventive health counseling, as reflected in patient instructions       Regular exercise       Healthy diet/nutrition      BMI:   Estimated body mass index is 35  "kg/m  as calculated from the following:    Height as of this encounter: 1.753 m (5' 9\").    Weight as of this encounter: 107.5 kg (237 lb).   Weight management plan: Discussed healthy diet and exercise guidelines      He reports that he has never smoked. He has never used smokeless tobacco.      Appropriate preventive services were discussed with this patient, including applicable screening as appropriate for cardiovascular disease, diabetes, osteopenia/osteoporosis, and glaucoma.  As appropriate for age/gender, discussed screening for colorectal cancer, prostate cancer, breast cancer, and cervical cancer. Checklist reviewing preventive services available has been given to the patient.    Reviewed patients plan of care and provided an AVS. The Basic Care Plan (routine screening as documented in Health Maintenance) for Rui meets the Care Plan requirement. This Care Plan has been established and reviewed with the Patient.          Meng Barboza MD  [facility]    Identified Health Risks:  I have reviewed Opioid Use Disorder and Substance Use Disorder risk factors and made any needed referrals. Answers submitted by the patient for this visit:  Patient Health Questionnaire (Submitted on 9/11/2023)  If you checked off any problems, how difficult have these problems made it for you to do your work, take care of things at home, or get along with other people?: Not difficult at all  PHQ9 TOTAL SCORE: 0    "

## 2023-09-15 NOTE — PROGRESS NOTES
DISCHARGE  Reason for Discharge: Patient chooses to discontinue therapy.  Patient has failed to schedule further appointments.    Equipment Issued: NA    Discharge Plan: Patient to continue home program.    Referring Provider:  Megan Reddy     08/11/23 0500   Appointment Info   Signing clinician's name / credentials Damian Dawn, PT   Visits Used 2   Medical Diagnosis Back muscle spasm   PT Tx Diagnosis Back muscle spasm   Progress Note/Certification   Start of Care Date 07/25/23   Onset of illness/injury or Date of Surgery 07/04/23   Therapy Frequency 1x/week   Predicted Duration 90 days   Certification date from 07/25/23   Certification date to 10/23/23   Progress Note Completed Date 07/25/23   PT Goal 1   Goal Identifier HEP   Goal Description Patient will be independent with HEP and self management of symptoms   Rationale to maximize safety and independence with performance of ADLs and functional tasks   Goal Progress in progress, continue goal   Target Date 10/23/23   PT Goal 2   Goal Identifier return to yardwork/woodworking   Goal Description Patient will demonstrate appropriate lifting techniques without pain to facilitate return to yardwork and woodworking   Rationale to maximize safety and independence with performance of ADLs and functional tasks   Goal Progress no pain with woodworking so far   Target Date 10/23/23   Date Met 08/11/23   PT Goal 3   Goal Identifier seat belt   Goal Description Patient will rotate his trunk to put on his seat belt without pain.   Rationale to maximize safety and independence with performance of ADLs and functional tasks   Goal Progress met   Target Date 10/23/23   Date Met 08/11/23   Subjective Report   Subjective Report The first 3 days of HEP were quite sore, but now it is much much better.  He reports 80-85% overall improvement since stanrting PT.   Objective Measures   Objective Measures Objective Measure 1;Objective Measure 2   Objective Measure 1  "  Objective Measure Pain 0/10, at best 0/10, at worst 2-3/10   Objective Measure 2   Objective Measure ARTURO at evaluation 20%   Therapeutic Procedure/Exercise   Therapeutic Procedures: strength, endurance, ROM, flexibillity minutes ([id number]) 10   Ther Proc 1 Nustep 5 minutes workload 5 with discussion of current symptoms, progress and HEP.   Ther Proc 1 - Details for increased circulation and tissue extensibility   Ther Proc 2 reach up and over with sidebending x 5 bilateral   Ther Proc 2 - Details for increased lumbar mobility   Ther Proc 3 LTR supine x 20 bilateral   Ther Proc 3 - Details for increased lumbar mobility   Skilled Intervention Initiation of HEP with verbal instruction, demonstration and patient trial as needed.  Written/picture instruction also provided.   Patient Response/Progress tolerated well. patient verbalized understanding   Neuromuscular Re-education   Neuromuscular re-ed of mvmt, balance, coord, kinesthetic sense, posture, proprioception minutes (02516) 15   Neuro Re-ed 1 transverse abdominal set 5\" x 10 verbal reivew. Pt practicing when he walks and when seated   Neuro Re-ed 1 - Details for increased trunk strength and stability   Neuro Re-ed 2 bridging x 10, patient performing 30 reps at home   Neuro Re-ed 2 - Details for increased trunk strength and stability   Skilled Intervention review and progression of HEP with verbal instruction, demonstration and patient trial as needed. Written/picture instruction also provided.   Patient Response/Progress tolerated well. patient verbalized understanding   Neuro Re-ed 4 side plank elbow and knee x 5 reps bilateral with instruction for progression to short hold   Neuro Re-ed 4 - Details for increased trunk strength and stability   Neuro Re-ed 5 prone plank elbows and knees 10\" with instruction for slowly increasing hold duration as tolerated   Neuro Re-ed 5 - Details for increased trunk strength and stability   Neuro Re-ed 6 instruction for " progression to plank and side plank on elbow and toes/foot as tolerated.   Neuro Re-ed 3 briding with marching- patient trial   Neuro Re-ed 3 - Details for increased trunk strength and stability   Education   Learner/Method Patient;No Barriers to Learning   Plan   Plan for next session trial of independent self management started.  If patient does not return within 30 days, PT will be discharged.   Comments   Comments Excellent improvement since 1st visit.  ARTURO decreased from 20t% o 6%.. Pain is decreased 0/10 most of the time, imtermittently 2-3/10.   Total Session Time   Timed Code Treatment Minutes 25   Total Treatment Time (sum of timed and untimed services) 25

## 2023-12-11 ENCOUNTER — PATIENT OUTREACH (OUTPATIENT)
Dept: GASTROENTEROLOGY | Facility: CLINIC | Age: 68
End: 2023-12-11
Payer: COMMERCIAL

## 2024-01-17 NOTE — TELEPHONE ENCOUNTER
----- Message from Stephan Oscar DO sent at 2/26/2020  9:32 AM CST -----  CT scan of the cervical spine reviewed.  The area of the cervical spine at C5-6 felt to have a disc herniation on MRI shows 2 large bone spurs narrowing the canal surrounding the spinal cord.  Given that this region is bone and not soft tissue/disc, I recommend that he be evaluated by Dr. Manuel in neurosurgery to get a surgical opinion.  Order has been placed.   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  The last visit was on 1/18/2023.  Please refer to my initial/other prior notes for further information.    Since the last visit, the patient reports a fall on 1/9/2024 with associated head injury.  Does not remember all details.  Had headache, nausea, and increased sensitivity to light/sound.  Was seen at [facility] emergency department.  Head CT was negative for acute intracranial abnormality.  Chronic changes of right cranioplasty and right MCA aneurysmal clip placement were seen.  CT of the cervical spine was negative for fracture or subluxation as well as significant spinal canal or neuroforaminal stenosis.  Refer to TBI clinic at [facility].    The patient denies any seizure recurrence.  Had an episode of vertigo in November 2023, improved with PT. regarding his head injury, still has staples in his head. Went to work yesterday. It was OK day, but today feels mildly dizzy. Has appointment on Friday at [facility] TBI clinic. He is on 450 mg of Trileptal twice daily.  Latest CMP and CBC from November 2023 were unrevealing.  Latest oxcarbazepine level from February 2023 was in therapeutic range (10).  EXAM:                                                    Physical Exam:   Vitals: /84 (BP Location: Right arm, Patient Position: Sitting, Cuff Size: Adult Large)   Pulse 75   SpO2 98%     General: pt is in NAD, cooperative.  Skin: normal turgor, moist mucous membranes, no lesions/rashes  noticed.  HEENT: ATNC, white sclera, normal conjunctiva.  Respiratory: Symmetric lung excursion, no accessory respiratory muscle use.  Abdomen: Non distended.  Neurological: awake, cooperative, follows commands, no aphasia or dysarthria noted, cranial nerves II-XII: no ptosis, face is symmetric, equally moves all extremities, no dysmetria bilaterally, casual gait is normal.  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  He remains seizure-free on current therapy, but sustained head injury in January 2024.  He should continue carbamazepine without changes.  I refilled his prescription.  I also filled his [facility] form per his request.    Regarding his recent head injury, we discussed that most likely he suffered a concussion that takes time to heal.  He is already scheduled to see a provider at traumatic brain injury clinic on this coming Friday.  He went to work yesterday, but today feels that his symptoms are coming back, including dizziness and sensitivity to sounds.  I suggested for him to take couple more days off until he is seen at the TBI clinic.  I provided him a note to excuse him from work until then.    Diagnoses:    ICD-10-CM    1. Seizure disorder (H)  G40.909       2. History of cerebral aneurysm repair  Z98.890     Z86.79       3. Concussion without loss of consciousness, subsequent encounter  S06.0X0D         Plan: At today's visit we thoroughly discussed current symptoms, available treatment options, and the plan.    We decided to continue oxcarbazepine without changes.  I renewed his prescription.    Regarding head injury/concussion, I gave him an excuse from work until Friday when he will see a provider at TBI clinic.  As we discussed, it will take a few weeks to heal, sometimes longer, but he needs to increase his daily activities gradually and  listen to his body to adjust based on symptoms.    Next follow-up appointment is in the next 1 year or earlier if needed.    Total Time: 41 minutes spent on the date of the encounter doing chart review, history and exam, documentation and further activities per the note.    Humberto Clark MD  [facility]  (Chart documentation was completed in part with Dragon voice-recognition software. Even though reviewed, some grammatical, spelling, and word errors may remain.)

## 2024-03-08 ENCOUNTER — OFFICE VISIT (OUTPATIENT)
Dept: INTERNAL MEDICINE | Facility: CLINIC | Age: 69
End: 2024-03-08
Payer: COMMERCIAL

## 2024-03-08 VITALS
SYSTOLIC BLOOD PRESSURE: 104 MMHG | BODY MASS INDEX: 35.19 KG/M2 | HEART RATE: 72 BPM | DIASTOLIC BLOOD PRESSURE: 60 MMHG | RESPIRATION RATE: 18 BRPM | OXYGEN SATURATION: 97 % | HEIGHT: 69 IN | WEIGHT: 237.6 LBS | TEMPERATURE: 98.1 F

## 2024-03-08 DIAGNOSIS — G47.33 OBSTRUCTIVE SLEEP APNEA ON CPAP: ICD-10-CM

## 2024-03-08 DIAGNOSIS — Z86.0100 HISTORY OF COLONIC POLYPS: ICD-10-CM

## 2024-03-08 DIAGNOSIS — N18.2 CHRONIC RENAL INSUFFICIENCY, STAGE 2 (MILD): ICD-10-CM

## 2024-03-08 DIAGNOSIS — Z51.81 ENCOUNTER FOR THERAPEUTIC DRUG MONITORING: ICD-10-CM

## 2024-03-08 DIAGNOSIS — I10 ESSENTIAL HYPERTENSION: Primary | ICD-10-CM

## 2024-03-08 DIAGNOSIS — H40.9 GLAUCOMA, UNSPECIFIED GLAUCOMA TYPE, UNSPECIFIED LATERALITY: ICD-10-CM

## 2024-03-08 DIAGNOSIS — Z86.711 HISTORY OF PULMONARY EMBOLISM: ICD-10-CM

## 2024-03-08 PROCEDURE — 80048 BASIC METABOLIC PNL TOTAL CA: CPT | Performed by: INTERNAL MEDICINE

## 2024-03-08 PROCEDURE — 36415 COLL VENOUS BLD VENIPUNCTURE: CPT | Performed by: INTERNAL MEDICINE

## 2024-03-08 PROCEDURE — 99214 OFFICE O/P EST MOD 30 MIN: CPT | Performed by: INTERNAL MEDICINE

## 2024-03-08 PROCEDURE — G2211 COMPLEX E/M VISIT ADD ON: HCPCS | Performed by: INTERNAL MEDICINE

## 2024-03-08 RX ORDER — COVID-19 VACCINE, MRNA 0.04 MG/.418ML
INJECTION, SUSPENSION INTRAMUSCULAR
COMMUNITY
Start: 2023-09-20 | End: 2024-03-08

## 2024-03-08 RX ORDER — SPIRONOLACTONE 25 MG/1
12.5 TABLET ORAL DAILY
Status: SHIPPED
Start: 2024-03-08 | End: 2024-05-21

## 2024-03-08 RX ORDER — RESPIRATORY SYNCYTIAL VIRUS VACCINE 120MCG/0.5
0.5 KIT INTRAMUSCULAR ONCE
Qty: 1 EACH | Refills: 0 | Status: CANCELLED | OUTPATIENT
Start: 2024-03-08 | End: 2024-03-08

## 2024-03-08 NOTE — PROGRESS NOTES
Rui Lopez   69 year old male    Date of Visit: 3/8/2024    Chief Complaint   Patient presents with    RECHECK     BP F/U     Subjective  Following up for hypertension.  He still getting blood pressures in the 110/60-1 20/70 range.  Does not get above 130 systolic.  Blood pressure was borderline low last fall as well at 104/59.  Weight is stable.  Still moderately overweight.    Has been walking 5 days a week and up to 2 miles at a time with good exertional ability.  His left knee osteoarthritis is bothering him last.  He does not take NSAIDs.    He does have mild chronic renal insufficiency that is been stable with a creatinine of 1.3.  No edema.    Chlorthalidone 12.5 mg a day and spironolactone 25 mg a day.  His potassium levels been normal.    Severe sleep apnea but highly compliant with CPAP.  No palpitations or atrial fibrillation.  No worsening shortness of breath or edema.    No recent recurrent gout on allopurinol.    September 2023 LDL 83 without medication.  No history of vascular disease.  No chest pain or chest pressure or increasing shortness of breath with activity.    History of pulmonary embolism in 2018 on long-term Xarelto and no bleeding issues.    History of moderate to severe cervical spine stenosis but no flare of his neck pain or radicular pain.    No abdominal pain or change in bowels.  October 2021 colonoscopy with 1 polyp, 5-year follow-up plan.    Glaucoma controlled and no change in vision or new headaches.  He has a follow-up appoint with ophthalmology for routine 6-month follow-up in May.    No cough or recent respiratory infection.  He does not want vaccines.    He does not do woodworking.    PMHx:    Past Medical History:   Diagnosis Date    DVT (deep vein thrombosis) in pregnancy     High cholesterol     History of pulmonary embolus (PE)     Hypertension     Leg swelling     Numbness and tingling     Sarcoidosis      PSHx:    Past Surgical History:   Procedure Laterality  "Date    CATARACT EXTRACTION Left 2006    CATARACT EXTRACTION Right 2004    CYSTECTOMY  1973    Testicular cystectomy    IR PULMONARY ANGIOGRAM BILATERAL  1/2/2018    IR THROMBOLYTICS -ARTERIAL INITIAL DAY  1/1/2018    RETINAL DETACHMENT SURGERY Left 2005    RETINAL DETACHMENT SURGERY Right 2004     Immunizations:   Immunization History   Administered Date(s) Administered    COVID-19 12+ (2023-24) (Pfizer) 09/20/2023    COVID-19 Bivalent 12+ (Pfizer) 09/12/2022    COVID-19 MONOVALENT 12+ (Pfizer) 04/03/2021, 04/24/2021, 11/21/2021    COVID-19 Monovalent 12+ (Pfizer 2022) 05/22/2022    Influenza Vaccine 65+ (Fluzone HD) 11/09/2022, 09/11/2023    TD,PF 7+ (Tenivac) 01/01/1999    TDAP (Adacel,Boostrix) 08/13/2013       ROS A comprehensive review of systems was performed and was otherwise negative    Medications, allergies, and problem list were reviewed and updated    Exam  /60 (BP Location: Right arm, Patient Position: Sitting, Cuff Size: Adult Regular)   Pulse 72   Temp 98.1  F (36.7  C) (Oral)   Resp 18   Ht 1.753 m (5' 9.02\")   Wt 107.8 kg (237 lb 9.6 oz)   SpO2 97%   BMI 35.07 kg/m    Appears well.  Mobility is normal.  Still moderately overweight.  Lungs are clear.  Heart is regular without murmur and no edema    Assessment/Plan  1. Essential hypertension  Blood pressure is still running on the low side, while it appears he is tolerating it he does have mild renal insufficiency and a tendency toward lower blood pressures.    Reduce spironolactone to 12.5 mg a day.  See patient instructions.  Continue chlorthalidone 12.5 mg daily  - spironolactone (ALDACTONE) 25 MG tablet; Take 0.5 tablets (12.5 mg) by mouth daily    2. Chronic renal insufficiency, stage 2 (mild)  Monitor labs    3. Obstructive sleep apnea on CPAP  Highly compliant with CPAP.  Benefiting from CPAP.    4. History of pulmonary embolism  Long-term Xarelto, no evidence of recurrence and no bleeding issues    5. Glaucoma, unspecified " glaucoma type, unspecified laterality  Controlled.  Continue drops.  Follow-up with ophthalmology in May for routine follow-up    6. History of colonic polyps  No evidence of new symptoms.  5-year colonoscopy follow-up October 2026    7. Encounter for therapeutic drug monitoring  Medication monitoring  - Basic metabolic panel    Patient declines vaccines but could consider a Prevnar 20 vaccine, could consider a tetanus booster, and has not wanted COVID vaccines.    Cervical spine stenosis without exacerbation.    History of gout without recent occurrence, continue allopurinol    Umbilical hernia, no complaints      Return in about 6 months (around 9/12/2024).   Patient Instructions   Reduce the spironolactone down to 12.5 mg a day by cutting pills in half.  Continue the 12.5 mg of chlorthalidone.    Goal blood pressure less than 135/85 but not less than 110/60.    Continue your regular walking.  Do not use any ibuprofen or Advil or Aleve medications as that can affect blood pressure and kidney function.    Follow-up after September 11 for your yearly adult wellness visit physical exam and blood pressure follow-up.    Meng Barboza MD, MD        Current Outpatient Medications   Medication Sig Dispense Refill    allopurinol (ZYLOPRIM) 100 MG tablet Take 2 tablets (200 mg) by mouth daily To prevent gout 180 tablet 3    chlorthalidone (HYGROTON) 25 MG tablet TAKE 1/2 TABLET(12.5 MG) BY MOUTH DAILY 45 tablet 3    latanoprost (XALATAN) 0.005 % ophthalmic solution [LATANOPROST (XALATAN) 0.005 % OPHTHALMIC SOLUTION] USE ONE DROP IN OU D  4    rivaroxaban ANTICOAGULANT (XARELTO) 20 MG TABS tablet Take 1 tablet (20 mg) by mouth every evening 90 tablet 3    spironolactone (ALDACTONE) 25 MG tablet Take 0.5 tablets (12.5 mg) by mouth daily      vitamin D3 (CHOLECALCIFEROL) 50 mcg (2000 units) tablet Take 1 tablet by mouth daily       No Known Allergies  Social History     Tobacco Use    Smoking status: Never    Smokeless  tobacco: Never   Vaping Use    Vaping Use: Never used   Substance Use Topics    Alcohol use: Yes     Comment: Alcoholic Drinks/day: occ beer    Drug use: No             Subjective   Rui is a 69 year old, presenting for the following health issues:  RECHECK (BP F/U)      3/8/2024     9:42 AM   Additional Questions   Roomed by EZRA Forman     History of Present Illness       Hypertension: He presents for follow up of hypertension.  He does check blood pressure  regularly outside of the clinic. Outpatient blood pressures have not been over 140/90. He does not follow a low salt diet.     He eats 2-3 servings of fruits and vegetables daily.He consumes 0 sweetened beverage(s) daily.He exercises with enough effort to increase his heart rate 30 to 60 minutes per day.  He exercises with enough effort to increase his heart rate 5 days per week.   He is taking medications regularly.                     Objective    There were no vitals taken for this visit.  There is no height or weight on file to calculate BMI.  Physical Exam               Signed Electronically by: Meng Barboza MD

## 2024-03-08 NOTE — PATIENT INSTRUCTIONS
Reduce the spironolactone down to 12.5 mg a day by cutting pills in half.  Continue the 12.5 mg of chlorthalidone.    Goal blood pressure less than 135/85 but not less than 110/60.    Continue your regular walking.  Do not use any ibuprofen or Advil or Aleve medications as that can affect blood pressure and kidney function.    Follow-up after September 11 for your yearly adult wellness visit physical exam and blood pressure follow-up.

## 2024-03-09 LAB
ANION GAP SERPL CALCULATED.3IONS-SCNC: 10 MMOL/L (ref 7–15)
BUN SERPL-MCNC: 22 MG/DL (ref 8–23)
CALCIUM SERPL-MCNC: 9.1 MG/DL (ref 8.8–10.2)
CHLORIDE SERPL-SCNC: 102 MMOL/L (ref 98–107)
CREAT SERPL-MCNC: 1.26 MG/DL (ref 0.67–1.17)
DEPRECATED HCO3 PLAS-SCNC: 26 MMOL/L (ref 22–29)
EGFRCR SERPLBLD CKD-EPI 2021: 63 ML/MIN/1.73M2
GLUCOSE SERPL-MCNC: 132 MG/DL (ref 70–99)
POTASSIUM SERPL-SCNC: 4 MMOL/L (ref 3.4–5.3)
SODIUM SERPL-SCNC: 138 MMOL/L (ref 135–145)

## 2024-03-18 DIAGNOSIS — Z87.39 HISTORY OF GOUT: ICD-10-CM

## 2024-03-18 RX ORDER — ALLOPURINOL 100 MG/1
200 TABLET ORAL DAILY
Qty: 180 TABLET | Refills: 3 | Status: SHIPPED | OUTPATIENT
Start: 2024-03-18

## 2024-05-01 ENCOUNTER — TRANSFERRED RECORDS (OUTPATIENT)
Dept: HEALTH INFORMATION MANAGEMENT | Facility: CLINIC | Age: 69
End: 2024-05-01
Payer: COMMERCIAL

## 2024-05-01 LAB — RETINOPATHY: NORMAL

## 2024-05-21 DIAGNOSIS — I10 ESSENTIAL HYPERTENSION: ICD-10-CM

## 2024-05-21 RX ORDER — SPIRONOLACTONE 25 MG/1
12.5 TABLET ORAL DAILY
Qty: 45 TABLET | Refills: 3 | Status: SHIPPED | OUTPATIENT
Start: 2024-05-21 | End: 2024-09-16

## 2024-09-16 ENCOUNTER — OFFICE VISIT (OUTPATIENT)
Dept: INTERNAL MEDICINE | Facility: CLINIC | Age: 69
End: 2024-09-16
Payer: COMMERCIAL

## 2024-09-16 VITALS
BODY MASS INDEX: 33.03 KG/M2 | SYSTOLIC BLOOD PRESSURE: 116 MMHG | WEIGHT: 223 LBS | DIASTOLIC BLOOD PRESSURE: 60 MMHG | TEMPERATURE: 98 F | HEIGHT: 69 IN | RESPIRATION RATE: 18 BRPM | HEART RATE: 86 BPM | OXYGEN SATURATION: 97 %

## 2024-09-16 DIAGNOSIS — Z23 NEED FOR VACCINATION AGAINST STREPTOCOCCUS PNEUMONIAE: ICD-10-CM

## 2024-09-16 DIAGNOSIS — Z86.711 HISTORY OF PULMONARY EMBOLISM: ICD-10-CM

## 2024-09-16 DIAGNOSIS — G47.33 OBSTRUCTIVE SLEEP APNEA ON CPAP: ICD-10-CM

## 2024-09-16 DIAGNOSIS — Z51.81 ENCOUNTER FOR THERAPEUTIC DRUG MONITORING: ICD-10-CM

## 2024-09-16 DIAGNOSIS — Z12.5 SCREENING FOR PROSTATE CANCER: ICD-10-CM

## 2024-09-16 DIAGNOSIS — N18.2 CHRONIC RENAL INSUFFICIENCY, STAGE 2 (MILD): ICD-10-CM

## 2024-09-16 DIAGNOSIS — Z00.00 ENCOUNTER FOR WELLNESS EXAMINATION IN ADULT: Primary | ICD-10-CM

## 2024-09-16 DIAGNOSIS — Z23 NEED FOR IMMUNIZATION AGAINST INFLUENZA: ICD-10-CM

## 2024-09-16 DIAGNOSIS — R73.9 HYPERGLYCEMIA: ICD-10-CM

## 2024-09-16 DIAGNOSIS — I10 ESSENTIAL HYPERTENSION: ICD-10-CM

## 2024-09-16 DIAGNOSIS — H40.9 GLAUCOMA, UNSPECIFIED GLAUCOMA TYPE, UNSPECIFIED LATERALITY: ICD-10-CM

## 2024-09-16 LAB
ALBUMIN SERPL BCG-MCNC: 4.1 G/DL (ref 3.5–5.2)
ALP SERPL-CCNC: 85 U/L (ref 40–150)
ALT SERPL W P-5'-P-CCNC: 11 U/L (ref 0–70)
ANION GAP SERPL CALCULATED.3IONS-SCNC: 12 MMOL/L (ref 7–15)
AST SERPL W P-5'-P-CCNC: 23 U/L (ref 0–45)
BILIRUB SERPL-MCNC: 0.6 MG/DL
BUN SERPL-MCNC: 21.1 MG/DL (ref 8–23)
CALCIUM SERPL-MCNC: 8.9 MG/DL (ref 8.8–10.4)
CHLORIDE SERPL-SCNC: 107 MMOL/L (ref 98–107)
CHOLEST SERPL-MCNC: 154 MG/DL
CREAT SERPL-MCNC: 1.21 MG/DL (ref 0.67–1.17)
EGFRCR SERPLBLD CKD-EPI 2021: 66 ML/MIN/1.73M2
ERYTHROCYTE [DISTWIDTH] IN BLOOD BY AUTOMATED COUNT: 12.5 % (ref 10–15)
FASTING STATUS PATIENT QL REPORTED: YES
FASTING STATUS PATIENT QL REPORTED: YES
GLUCOSE SERPL-MCNC: 100 MG/DL (ref 70–99)
HBA1C MFR BLD: 5.9 % (ref 0–5.6)
HCO3 SERPL-SCNC: 25 MMOL/L (ref 22–29)
HCT VFR BLD AUTO: 50.2 % (ref 40–53)
HDLC SERPL-MCNC: 41 MG/DL
HGB BLD-MCNC: 17.4 G/DL (ref 13.3–17.7)
LDLC SERPL CALC-MCNC: 92 MG/DL
MCH RBC QN AUTO: 31.2 PG (ref 26.5–33)
MCHC RBC AUTO-ENTMCNC: 34.7 G/DL (ref 31.5–36.5)
MCV RBC AUTO: 90 FL (ref 78–100)
NONHDLC SERPL-MCNC: 113 MG/DL
PLATELET # BLD AUTO: 135 10E3/UL (ref 150–450)
POTASSIUM SERPL-SCNC: 3.8 MMOL/L (ref 3.4–5.3)
PROT SERPL-MCNC: 6.5 G/DL (ref 6.4–8.3)
PSA SERPL DL<=0.01 NG/ML-MCNC: 0.3 NG/ML (ref 0–4.5)
RBC # BLD AUTO: 5.58 10E6/UL (ref 4.4–5.9)
SODIUM SERPL-SCNC: 144 MMOL/L (ref 135–145)
TRIGL SERPL-MCNC: 103 MG/DL
WBC # BLD AUTO: 7.2 10E3/UL (ref 4–11)

## 2024-09-16 PROCEDURE — G0103 PSA SCREENING: HCPCS | Performed by: INTERNAL MEDICINE

## 2024-09-16 PROCEDURE — 99214 OFFICE O/P EST MOD 30 MIN: CPT | Mod: 25 | Performed by: INTERNAL MEDICINE

## 2024-09-16 PROCEDURE — 80053 COMPREHEN METABOLIC PANEL: CPT | Performed by: INTERNAL MEDICINE

## 2024-09-16 PROCEDURE — 90662 IIV NO PRSV INCREASED AG IM: CPT | Performed by: INTERNAL MEDICINE

## 2024-09-16 PROCEDURE — 83036 HEMOGLOBIN GLYCOSYLATED A1C: CPT | Performed by: INTERNAL MEDICINE

## 2024-09-16 PROCEDURE — G0439 PPPS, SUBSEQ VISIT: HCPCS | Performed by: INTERNAL MEDICINE

## 2024-09-16 PROCEDURE — 36415 COLL VENOUS BLD VENIPUNCTURE: CPT | Performed by: INTERNAL MEDICINE

## 2024-09-16 PROCEDURE — G0008 ADMIN INFLUENZA VIRUS VAC: HCPCS | Performed by: INTERNAL MEDICINE

## 2024-09-16 PROCEDURE — 85027 COMPLETE CBC AUTOMATED: CPT | Performed by: INTERNAL MEDICINE

## 2024-09-16 PROCEDURE — 80061 LIPID PANEL: CPT | Performed by: INTERNAL MEDICINE

## 2024-09-16 RX ORDER — CHLORTHALIDONE 25 MG/1
12.5 TABLET ORAL EVERY OTHER DAY
Status: SHIPPED
Start: 2024-09-16 | End: 2024-09-17

## 2024-09-16 RX ORDER — SPIRONOLACTONE 25 MG/1
12.5 TABLET ORAL EVERY OTHER DAY
Status: SHIPPED
Start: 2024-09-16

## 2024-09-16 NOTE — PATIENT INSTRUCTIONS
Great job on diet and exercise!  Keep up your routine.    Make sure your CPAP machine is working well, and that you are using it every night.    As you lose weight your blood pressure may get lower.  If blood pressures less than 110/60 or lightheaded dizzy spells, stopping the spironolactone and chlorthalidone.  But then make sure your blood pressure does not get too high.  Goal blood pressure less than 135/85.    Your colonoscopy will be due October 2026.    Get the new COVID-vaccine as soon as you are able at your local pharmacy.    You are due for a tetanus shot, which can be obtained at local pharmacy.    Follow-up in 6 months for blood pressure checkup

## 2024-09-16 NOTE — PROGRESS NOTES
Preventive Care Visit  [facility]  Meng Barboza MD, Internal Medicine  Sep 16, 2024        Rui Lopez   69 year old male    Date of Visit: 9/16/2024    Chief Complaint   Patient presents with    Medicare Visit     fasting     Subjective  69-year-old female lives independently with wife.  He has made dramatic improvements in diet and exercise.  He stopped the chips and pop and French fries and bread and has lost 14 pounds.  He is walking for 2 miles 5 times a week.  No longer having any right knee pain.  He has good orthotics for his shoes and not having foot pain.    He feels much more energetic since losing the weight.    He has severe obstructive sleep apnea but highly compliant with CPAP.  No palpitations or increasing shortness of breath or edema.    No history of vascular disease.  No chest pain.  No palpitations.  No history of arrhythmia.    He had a pulmonary embolism back in 2018 and remains on Xarelto long-term.  No new calf pain or leg edema.  No bleeding issues or falls.    Does have mild chronic renal insufficiency with a creatinine 1.2-1.3.    His blood pressure did get somewhat lower and he reduced his chlorthalidone and spironolactone down to 12.5 mg every other day.  He was on 12.5 mg every day of each pill previously.    He denies lightheaded dizzy spells now.  No edema.    History of moderate to severe cervical neck stenosis but no flare of neck pain at this time.    He does not wish to intervene on umbilical hernia, does not cause any symptoms.    Glaucoma control and he seen ophthalmology within the past 6 months.    No recent gout on allopurinol.    Bowels are normal without blood in stool..  No abdominal pain.    Colonoscopy October 2021 showed 1 polyp with a 5-year follow-up plan.    No new urinary symptoms.    He did have some borderline elevated blood sugars last year 103-130, before he made his diet changes.    No headache complaints.    No vision changes.  No  "swallowing difficulty.  No new cough.  No skin changes.    PMHx:    Past Medical History:   Diagnosis Date    DVT (deep vein thrombosis) in pregnancy     High cholesterol     History of pulmonary embolus (PE)     Hypertension     Leg swelling     Numbness and tingling     Sarcoidosis      PSHx:    Past Surgical History:   Procedure Laterality Date    CATARACT EXTRACTION Left 2006    CATARACT EXTRACTION Right 2004    CYSTECTOMY  1973    Testicular cystectomy    IR PULMONARY ANGIOGRAM BILATERAL  1/2/2018    IR THROMBOLYTICS -ARTERIAL INITIAL DAY  1/1/2018    RETINAL DETACHMENT SURGERY Left 2005    RETINAL DETACHMENT SURGERY Right 2004     Immunizations:   Immunization History   Administered Date(s) Administered    COVID-19 12+ (Pfizer) 09/20/2023    COVID-19 Bivalent 12+ (Pfizer) 09/12/2022    COVID-19 MONOVALENT 12+ (Pfizer) 04/03/2021, 04/24/2021, 11/21/2021    COVID-19 Monovalent 12+ (Pfizer 2022) 05/22/2022    Influenza Vaccine 65+ (Fluzone HD) 11/09/2022, 09/11/2023    TD,PF 7+ (Tenivac) 01/01/1999    TDAP (Adacel,Boostrix) 08/13/2013       ROS A comprehensive review of systems was performed and was otherwise negative    Medications, allergies, and problem list were reviewed and updated    Exam  /60   Pulse 86   Temp 98  F (36.7  C)   Resp 18   Ht 1.76 m (5' 9.3\")   Wt 101.2 kg (223 lb)   SpO2 97%   BMI 32.65 kg/m    Alert and oriented x 3 with good mood and affect.  Mobility exam is normal.  Just mildly overweight.  Clock face drawing and word recall normal.  Pupils and irises equal and reactive.  Extraocular muscles intact.  No jaundice or conjunctivitis.  External ears and nose exam is normal with minimal cerumen and normal tympanic membranes.  Pharynx appears normal, not severely crowded.  No oral lesions.  Teeth in good condition.  No cervical or supraclavicular or axillary adenopathy.  No JVD and no carotid bruits.  No thyromegaly or nodularity.  Lungs are clear to auscultation with good " respiratory excursion.  Heart is regular with no murmur rub or gallop.  +1 pedal pulses and no ankle edema, feet in good condition.  Abdomen is just mildly overweight but nontender.  No hepatosplenomegaly or pulsatile abdominal mass.  Does have a moderate-sized umbilical hernia easily reducible.  Skin exam without suspicious skin lesions.  He declined  exam    Assessment/Plan  1. Encounter for wellness examination in adult  Main issue for patient is history of severe sleep apnea.  But he has gotten his weight under control and that is well-controlled now.  He is not having significant fatigue and not having heart failure or A-fib symptoms.    I continue to have him work on weight loss with low carbohydrate diet and get regular exercise as he is doing.    He does not have evidence of vascular disease, with excellent cholesterol levels and not planning statin.    We discussed CODE STATUS, with wish for full CODE STATUS.  - Full Code    See patient instructions for health maintenance plan    History of colon polyp with 5-year colonoscopy due October 2026    He does not wish intervention on his umbilical hernia    2. Essential hypertension  Well-controlled, on the low side.  He did reduce his chlorthalidone spironolactone already to every other day.  If he has lower blood pressures less than 110/60 with further weight loss, to stop chlorthalidone and spironolactone entirely and then monitor blood pressure.    No evidence of vascular disease and I am not planning statin  - Lipid Profile  - chlorthalidone (HYGROTON) 25 MG tablet; Take 0.5 tablets (12.5 mg) by mouth every other day.  - spironolactone (ALDACTONE) 25 MG tablet; Take 0.5 tablets (12.5 mg) by mouth every other day.    3. History of pulmonary embolism  Long-term Xarelto    4. Chronic renal insufficiency, stage 2 (mild)  Avoid NSAIDs.  Has been stable.    5. Obstructive sleep apnea on CPAP  Mildly compliant with CPAP.  Feels better after weight loss.    6.  Glaucoma, unspecified glaucoma type, unspecified laterality  Controlled on drops and managed by ophthalmology, gets seen every 6 months by ophthalmology    7. Hyperglycemia  Addressed with weight loss.  Screen for diabetes.  - Hemoglobin A1c    8. Screening for prostate cancer  Yearly screening  - Prostate Specific Antigen Screen    9. Encounter for therapeutic drug monitoring    - CBC with platelets  - Comprehensive metabolic panel    10. Need for immunization against influenza  Given today  - INFLUENZA HIGH DOSE, TRIVALENT, PF (FLUZONE)    11. Need for vaccination against Streptococcus pneumoniae  He declined the pneumococcal vaccine again.  I recommended to him, and placed a future order if he does change his mind.  - PNEUMOCOCCAL 20 VALENT CONJUGATE (PREVNAR 20); Future      Return in about 6 months (around 3/16/2025) for Nonfasting blood pressure checkup appointment.   Patient Instructions   Great job on diet and exercise!  Keep up your routine.    Make sure your CPAP machine is working well, and that you are using it every night.    As you lose weight your blood pressure may get lower.  If blood pressures less than 110/60 or lightheaded dizzy spells, stopping the spironolactone and chlorthalidone.  But then make sure your blood pressure does not get too high.  Goal blood pressure less than 135/85.    Your colonoscopy will be due October 2026.    Get the new COVID-vaccine as soon as you are able at your local pharmacy.    You are due for a tetanus shot, which can be obtained at local pharmacy.    Follow-up in 6 months for blood pressure checkup    Meng Barboza MD, MD        Current Outpatient Medications   Medication Sig Dispense Refill    allopurinol (ZYLOPRIM) 100 MG tablet Take 2 tablets (200 mg) by mouth daily To prevent gout 180 tablet 3    chlorthalidone (HYGROTON) 25 MG tablet Take 0.5 tablets (12.5 mg) by mouth every other day.      latanoprost (XALATAN) 0.005 % ophthalmic solution [LATANOPROST  (XALATAN) 0.005 % OPHTHALMIC SOLUTION] USE ONE DROP IN OU D  4    rivaroxaban ANTICOAGULANT (XARELTO) 20 MG TABS tablet Take 1 tablet (20 mg) by mouth every evening 90 tablet 3    spironolactone (ALDACTONE) 25 MG tablet Take 0.5 tablets (12.5 mg) by mouth every other day.      vitamin D3 (CHOLECALCIFEROL) 50 mcg (2000 units) tablet Take 1 tablet by mouth daily       No Known Allergies  Social History     Tobacco Use    Smoking status: Never     Passive exposure: Never    Smokeless tobacco: Never   Vaping Use    Vaping status: Never Used   Substance Use Topics    Alcohol use: Yes     Comment: Alcoholic Drinks/day: occ beer    Drug use: No             Subjective   Rui is a 69 year old, presenting for the following:  Medicare Visit (fasting)        9/16/2024    10:08 AM   Additional Questions   Roomed by Nayana CADET   Accompanied by kamron         Health Care Directive  Patient does not have a Health Care Directive or Living Will: Discussed advance care planning with patient; information given to patient to review.    HPI              9/12/2024   General Health   How would you rate your overall physical health? Good   Feel stress (tense, anxious, or unable to sleep) Not at all            9/12/2024   Nutrition   Diet: Regular (no restrictions)            9/12/2024   Exercise   Days per week of moderate/strenous exercise 5 days   Average minutes spent exercising at this level 30 min            9/12/2024   Social Factors   Frequency of gathering with friends or relatives Three times a week   Worry food won't last until get money to buy more No   Food not last or not have enough money for food? No   Do you have housing? (Housing is defined as stable permanent housing and does not include staying ouside in a car, in a tent, in an abandoned building, in an overnight shelter, or couch-surfing.) Yes   Are you worried about losing your housing? No   Lack of transportation? No   Unable to get utilities (heat,electricity)? No             9/12/2024   Fall Risk   Fallen 2 or more times in the past year? No    No   Trouble with walking or balance? No    No       Multiple values from one day are sorted in reverse-chronological order          9/12/2024   Activities of Daily Living- Home Safety   Needs help with the following daily activites None of the above   Safety concerns in the home None of the above            9/12/2024   Dental   Dentist two times every year? (!) NO            9/12/2024   Hearing Screening   Hearing concerns? (!) IT'S HARDER TO UNDERSTAND WOMEN'S VOICES THAN MEN'S VOICES.    (!) TROUBLE UNDERSTANDING SOFT OR WHISPERED SPEECH.       Multiple values from one day are sorted in reverse-chronological order         9/12/2024   Driving Risk Screening   Patient/family members have concerns about driving No            9/12/2024   General Alertness/Fatigue Screening   Have you been more tired than usual lately? No            9/12/2024   Urinary Incontinence Screening   Bothered by leaking urine in past 6 months No            9/12/2024   TB Screening   Were you born outside of the [place]? No            Today's PHQ-2 Score:       9/16/2024     9:55 AM   PHQ-2 ( 1999 Pfizer)   Q1: Little interest or pleasure in doing things 0   Q2: Feeling down, depressed or hopeless 0   PHQ-2 Score 0   Q1: Little interest or pleasure in doing things Not at all   Q2: Feeling down, depressed or hopeless Not at all   PHQ-2 Score 0           9/12/2024   Substance Use   Alcohol more than 3/day or more than 7/wk No   Do you have a current opioid prescription? No   How severe/bad is pain from 1 to 10? 1/10   Do you use any other substances recreationally? No        Social History     Tobacco Use    Smoking status: Never     Passive exposure: Never    Smokeless tobacco: Never   Vaping Use    Vaping status: Never Used   Substance Use Topics    Alcohol use: Yes     Comment: Alcoholic Drinks/day: occ beer    Drug use: No           9/12/2024   AAA Screening   Family history  "of Abdominal Aortic Aneurysm (AAA)? (!) YES       Last PSA:   Prostate Specific Antigen Screen   Date Value Ref Range Status   09/11/2023 0.21 0.00 - 4.50 ng/mL Final   09/08/2021 0.17 0.00 - 4.50 ug/L Final     ASCVD Risk   The 10-year ASCVD risk score (Ced JIMÉNEZ, et al., 2019) is: 14.3%    Values used to calculate the score:      Age: 69 years      Sex: Male      Is Non- : No      Diabetic: No      Tobacco smoker: No      Systolic Blood Pressure: 116 mmHg      Is BP treated: Yes      HDL Cholesterol: 39 mg/dL      Total Cholesterol: 159 mg/dL            Reviewed and updated as needed this visit by Provider                      Current providers sharing in care for this patient include:  Patient Care Team:  Meng Barboza MD as PCP - General (Internal Medicine)  Meng Barboza MD as Assigned PCP  Sagar Parikh MD as MD    The following health maintenance items are reviewed in Epic and correct as of today:  Health Maintenance   Topic Date Due    HEPATITIS C SCREENING  Never done    ZOSTER IMMUNIZATION (1 of 2) Never done    RSV VACCINE (1 - Risk 60-74 years 1-dose series) Never done    Pneumococcal Vaccine: 65+ Years (1 of 1 - PCV) Never done    ANNUAL REVIEW OF HM ORDERS  03/08/2023    DTAP/TDAP/TD IMMUNIZATION (2 - Td or Tdap) 08/13/2023    INFLUENZA VACCINE (1) 09/01/2024    COVID-19 Vaccine (7 - 2024-25 season) 09/01/2024    MEDICARE ANNUAL WELLNESS VISIT  09/11/2024    FALL RISK ASSESSMENT  09/16/2025    COLORECTAL CANCER SCREENING  10/11/2026    GLUCOSE  03/08/2027    LIPID  09/11/2028    ADVANCE CARE PLANNING  09/11/2028    PHQ-2 (once per calendar year)  Completed    HPV IMMUNIZATION  Aged Out    MENINGITIS IMMUNIZATION  Aged Out    RSV MONOCLONAL ANTIBODY  Aged Out            Objective    Exam  /60   Pulse 86   Temp 98  F (36.7  C)   Resp 18   Ht 1.76 m (5' 9.3\")   Wt 101.2 kg (223 lb)   SpO2 97%   BMI 32.65 kg/m     Estimated body mass index is 32.65 kg/m  " "as calculated from the following:    Height as of this encounter: 1.76 m (5' 9.3\").    Weight as of this encounter: 101.2 kg (223 lb).    Physical Exam           9/16/2024   Mini Cog   Clock Draw Score 2 Normal   3 Item Recall 3 objects recalled   Mini Cog Total Score 5                 Signed Electronically by: Meng Barboza MD    "

## 2024-09-17 DIAGNOSIS — I10 ESSENTIAL HYPERTENSION: ICD-10-CM

## 2024-09-17 RX ORDER — CHLORTHALIDONE 25 MG/1
12.5 TABLET ORAL DAILY
Qty: 45 TABLET | Refills: 3 | Status: SHIPPED | OUTPATIENT
Start: 2024-09-17

## 2024-10-24 ENCOUNTER — MYC MEDICAL ADVICE (OUTPATIENT)
Dept: INTERNAL MEDICINE | Facility: CLINIC | Age: 69
End: 2024-10-24
Payer: COMMERCIAL

## 2024-10-24 DIAGNOSIS — I10 ESSENTIAL HYPERTENSION: ICD-10-CM

## 2024-10-24 RX ORDER — SPIRONOLACTONE 25 MG/1
12.5 TABLET ORAL EVERY OTHER DAY
Qty: 90 TABLET | Refills: 0 | Status: SHIPPED | OUTPATIENT
Start: 2024-10-24

## 2024-10-24 NOTE — TELEPHONE ENCOUNTER
"LOV 9/16/24    \"His blood pressure did get somewhat lower and he reduced his chlorthalidone and spironolactone down to 12.5 mg every other day. He was on 12.5 mg every day of each pill previously. \"  "

## 2025-03-17 ENCOUNTER — OFFICE VISIT (OUTPATIENT)
Dept: INTERNAL MEDICINE | Facility: CLINIC | Age: 70
End: 2025-03-17
Payer: COMMERCIAL

## 2025-03-17 VITALS
OXYGEN SATURATION: 99 % | HEART RATE: 70 BPM | TEMPERATURE: 97.7 F | BODY MASS INDEX: 32.44 KG/M2 | WEIGHT: 219 LBS | SYSTOLIC BLOOD PRESSURE: 102 MMHG | DIASTOLIC BLOOD PRESSURE: 62 MMHG | RESPIRATION RATE: 18 BRPM | HEIGHT: 69 IN

## 2025-03-17 DIAGNOSIS — Z87.39 HISTORY OF GOUT: ICD-10-CM

## 2025-03-17 DIAGNOSIS — I10 ESSENTIAL HYPERTENSION: Primary | ICD-10-CM

## 2025-03-17 DIAGNOSIS — R73.9 HYPERGLYCEMIA: ICD-10-CM

## 2025-03-17 DIAGNOSIS — Z86.711 HISTORY OF PULMONARY EMBOLISM: ICD-10-CM

## 2025-03-17 DIAGNOSIS — G47.33 OBSTRUCTIVE SLEEP APNEA ON CPAP: ICD-10-CM

## 2025-03-17 PROCEDURE — 3074F SYST BP LT 130 MM HG: CPT | Performed by: INTERNAL MEDICINE

## 2025-03-17 PROCEDURE — G2211 COMPLEX E/M VISIT ADD ON: HCPCS | Performed by: INTERNAL MEDICINE

## 2025-03-17 PROCEDURE — 99214 OFFICE O/P EST MOD 30 MIN: CPT | Performed by: INTERNAL MEDICINE

## 2025-03-17 PROCEDURE — 3078F DIAST BP <80 MM HG: CPT | Performed by: INTERNAL MEDICINE

## 2025-03-17 RX ORDER — ALLOPURINOL 100 MG/1
200 TABLET ORAL DAILY
Qty: 180 TABLET | Refills: 3 | Status: SHIPPED | OUTPATIENT
Start: 2025-03-17

## 2025-03-17 RX ORDER — CHLORTHALIDONE 25 MG/1
12.5 TABLET ORAL EVERY OTHER DAY
Qty: 45 TABLET | Refills: 3 | Status: SHIPPED | OUTPATIENT
Start: 2025-03-17

## 2025-03-17 NOTE — PATIENT INSTRUCTIONS
Stop chlorthalidone and spironolactone and check your blood pressure.  Goal blood pressure less than 135/85 but not less than 110/60.  If your blood pressure gets too high or increasing leg swelling, you can go back on the every other day low-dose spironolactone and chlorthalidone.  But, if your blood pressure is well-controlled off the blood pressure medication, I will see you this fall for your annual wellness visit.    Consider a Prevnar 20 pneumonia vaccine.    You can reduce your dose of Xarelto blood thinner down to 15 mg a day, because it has been years since your blood clot.  You can reduce the bleeding risk with Xarelto by reducing the dose.

## 2025-03-17 NOTE — PROGRESS NOTES
Rui Lopez   70 year old male    Date of Visit: 3/17/2025    Chief Complaint   Patient presents with    Follow Up     6 mo follow up     Subjective  70-year-old male here for a blood pressure checkup appointment.  Patient has severe obstructive sleep apnea but is not highly compliant with CPAP.  He lost significant weight with change in diet, lost 14 pounds last year and lost another 4 pounds this winter.  He has been walking almost every day with good exertional ability and no chest pain or increasing shortness of breath.  He has chronic trace lower extremity edema which is stable, somewhat worse on the left.    History of pulmonary embolism in 2018 and has used chronic Xarelto.  No bleeding issues or falls.    He has borderline mild chronic renal sufficiency with a creatinine 1.2-1.3.    His blood pressure has been on the low side since admission weight.  He denies orthostasis.  But it has been running 110-115/60s at home.  He did reduce the chlorthalidone to 12.5 mg every other day in addition to spironolactone 12.5 mg every other day.  He denies orthostasis related to dizzy spells.    September 2024 hemoglobin A1c level was 5.9%.    He has seen ophthalmology recently for glaucoma checkup and that is controlled.    No recent cough or respiratory illness.  He does not want vaccines.        PMHx:    Past Medical History:   Diagnosis Date    DVT (deep vein thrombosis) in pregnancy     High cholesterol     History of pulmonary embolus (PE)     Hypertension     Leg swelling     Numbness and tingling     Sarcoidosis      PSHx:    Past Surgical History:   Procedure Laterality Date    CATARACT EXTRACTION Left 2006    CATARACT EXTRACTION Right 2004    CYSTECTOMY  1973    Testicular cystectomy    IR PULMONARY ANGIOGRAM BILATERAL  1/2/2018    IR THROMBOLYTICS -ARTERIAL INITIAL DAY  1/1/2018    RETINAL DETACHMENT SURGERY Left 2005    RETINAL DETACHMENT SURGERY Right 2004     Immunizations:   Immunization History  "  Administered Date(s) Administered    COVID-19 12+ (Pfizer) 09/20/2023    COVID-19 Bivalent 12+ (Pfizer) 09/12/2022    COVID-19 MONOVALENT 12+ (Pfizer) 04/03/2021, 04/24/2021, 11/21/2021    COVID-19 Monovalent 12+ (Pfizer 2022) 05/22/2022    Influenza (High Dose) Trivalent,PF (Fluzone) 09/16/2024    Influenza Vaccine 65+ (Fluzone HD) 11/09/2022, 09/11/2023    TD,PF 7+ (Tenivac) 01/01/1999    TDAP (Adacel,Boostrix) 08/13/2013       ROS A comprehensive review of systems was performed and was otherwise negative    Medications, allergies, and problem list were reviewed and updated    Exam  /62   Pulse 70   Temp 97.7  F (36.5  C)   Resp 18   Ht 1.76 m (5' 9.3\")   Wt 99.3 kg (219 lb)   SpO2 99%   BMI 32.06 kg/m    Appears well.  Lungs are clear.  Heart is regular without murmur.  Trace edema, somewhat more on the left    Assessment/Plan  1. Essential hypertension (Primary)  Blood pressure on the low side.  He continues to lose weight with improved diet and exercise.  Stop low-dose chlorthalidone and spironolactone every other day and monitor blood pressure closely.  See patient instructions.  - chlorthalidone (HYGROTON) 25 MG tablet; Take 0.5 tablets (12.5 mg) by mouth every other day.  Dispense: 45 tablet; Refill: 3    2. History of pulmonary embolism  Then on chronic Xarelto for over 5 years.  No recent DVT.  I did discuss reducing dose of the Xarelto to a maintenance 15 mg dose to reduce bleeding risk.  He is a borderline creatinine.  I did explain there is still risk of recurrent pulmonary embolism and DVT but reducing his dose to 15 mg a day may reduce bleeding risk.  Patient accepts risk of reducing dose of Xarelto and will proceed with reduced dose, but continue chronic use.  - rivaroxaban ANTICOAGULANT (XARELTO ANTICOAGULANT) 15 MG TABS tablet; Take 1 tablet (15 mg) by mouth every evening.  Dispense: 30 tablet; Refill: 5    3. History of gout  No recurrence, refill given  - allopurinol (ZYLOPRIM) 100 " MG tablet; Take 2 tablets (200 mg) by mouth daily. To prevent gout  Dispense: 180 tablet; Refill: 3    4. Hyperglycemia  Hemoglobin A1c level was 5.9% last September.  He continues to lose weight and improve diet.  He declined blood work today.    5. Encounter for therapeutic drug monitoring  He declined blood work today and will follow-up for physical exam in the fall.    I did discuss the Prevnar 20 and recommended he consider that vaccine but he does not wish to consider vaccines at this time.    Glaucoma, he states up-to-date with eye exams, controlled on drops.    Obstructive sleep apnea on CPAP.  Severe sleep apnea but highly compliant with CPAP and has significantly lost weight.  Denies significant daytime sleepiness.  Lower extremity edema stable, consistent with venous insufficiency and no evidence of diastolic heart failure at this time.    The longitudinal plan of care for the diagnosis(es)/condition(s) as documented were addressed during this visit. Due to the added complexity in care, I will continue to support Rui in the subsequent management and with ongoing continuity of care.        Return in about 6 months (around 9/17/2025) for Annual wellness visit.   Patient Instructions   Stop chlorthalidone and spironolactone and check your blood pressure.  Goal blood pressure less than 135/85 but not less than 110/60.  If your blood pressure gets too high or increasing leg swelling, you can go back on the every other day low-dose spironolactone and chlorthalidone.  But, if your blood pressure is well-controlled off the blood pressure medication, I will see you this fall for your annual wellness visit.    Consider a Prevnar 20 pneumonia vaccine.    You can reduce your dose of Xarelto blood thinner down to 15 mg a day, because it has been years since your blood clot.  You can reduce the bleeding risk with Xarelto by reducing the dose.    Meng Barboza MD, MD        Current Outpatient Medications   Medication  Sig Dispense Refill    allopurinol (ZYLOPRIM) 100 MG tablet Take 2 tablets (200 mg) by mouth daily. To prevent gout 180 tablet 3    chlorthalidone (HYGROTON) 25 MG tablet Take 0.5 tablets (12.5 mg) by mouth every other day. 45 tablet 3    latanoprost (XALATAN) 0.005 % ophthalmic solution [LATANOPROST (XALATAN) 0.005 % OPHTHALMIC SOLUTION] USE ONE DROP IN OU D  4    rivaroxaban ANTICOAGULANT (XARELTO ANTICOAGULANT) 15 MG TABS tablet Take 1 tablet (15 mg) by mouth every evening. 30 tablet 5    spironolactone (ALDACTONE) 25 MG tablet Take 0.5 tablets (12.5 mg) by mouth every other day. 90 tablet 0    vitamin D3 (CHOLECALCIFEROL) 50 mcg (2000 units) tablet Take 1 tablet by mouth daily       No Known Allergies  Social History     Tobacco Use    Smoking status: Never     Passive exposure: Never    Smokeless tobacco: Never   Vaping Use    Vaping status: Never Used   Substance Use Topics    Alcohol use: Yes     Comment: Alcoholic Drinks/day: occ beer    Drug use: No             Subjective   Rui is a 70 year old, presenting for the following health issues:  Follow Up (6 mo follow up)      3/17/2025     9:57 AM   Additional Questions   Roomed by Nayana CADET   Accompanied by kamron     History of Present Illness       Hypertension: He presents for follow up of hypertension.  He does not check blood pressure  regularly outside of the clinic. Outpatient blood pressures have not been over 140/90. He does not follow a low salt diet.     He eats 2-3 servings of fruits and vegetables daily.He consumes 0 sweetened beverage(s) daily.He exercises with enough effort to increase his heart rate 30 to 60 minutes per day.  He exercises with enough effort to increase his heart rate 5 days per week.   He is taking medications regularly.                      Objective    Pulse 70   Temp 97.7  F (36.5  C)   SpO2 99%   There is no height or weight on file to calculate BMI.  Physical Exam               Signed Electronically by: Meng Barboza MD